# Patient Record
Sex: MALE | Race: WHITE | Employment: OTHER | URBAN - METROPOLITAN AREA
[De-identification: names, ages, dates, MRNs, and addresses within clinical notes are randomized per-mention and may not be internally consistent; named-entity substitution may affect disease eponyms.]

---

## 2017-10-04 PROBLEM — I25.111 CORONARY ARTERY DISEASE INVOLVING NATIVE CORONARY ARTERY OF NATIVE HEART WITH ANGINA PECTORIS WITH DOCUMENTED SPASM (HCC): Status: ACTIVE | Noted: 2017-10-04

## 2018-01-01 ENCOUNTER — CARE COORDINATION (OUTPATIENT)
Dept: CARE COORDINATION | Age: 63
End: 2018-01-01

## 2018-01-01 ENCOUNTER — HOSPITAL ENCOUNTER (OUTPATIENT)
Age: 63
Discharge: HOME | End: 2018-09-19
Payer: MEDICARE

## 2018-01-01 ENCOUNTER — OFFICE VISIT (OUTPATIENT)
Dept: FAMILY MEDICINE CLINIC | Age: 63
End: 2018-01-01
Payer: MEDICARE

## 2018-01-01 ENCOUNTER — ANTI-COAG VISIT (OUTPATIENT)
Dept: FAMILY MEDICINE CLINIC | Age: 63
End: 2018-01-01
Payer: MEDICARE

## 2018-01-01 ENCOUNTER — HOSPITAL ENCOUNTER (INPATIENT)
Age: 63
LOS: 1 days | Discharge: HOME | DRG: 189 | End: 2018-11-08
Attending: EMERGENCY MEDICINE | Admitting: FAMILY MEDICINE
Payer: MEDICARE

## 2018-01-01 ENCOUNTER — OFFICE VISIT (OUTPATIENT)
Dept: NON INVASIVE DIAGNOSTICS | Age: 63
End: 2018-01-01
Payer: MEDICARE

## 2018-01-01 ENCOUNTER — ANTI-COAG VISIT (OUTPATIENT)
Dept: FAMILY MEDICINE CLINIC | Age: 63
End: 2018-01-01

## 2018-01-01 ENCOUNTER — APPOINTMENT (OUTPATIENT)
Dept: GENERAL RADIOLOGY | Age: 63
End: 2018-01-01
Payer: MEDICARE

## 2018-01-01 ENCOUNTER — NURSE ONLY (OUTPATIENT)
Dept: FAMILY MEDICINE CLINIC | Age: 63
End: 2018-01-01
Payer: MEDICARE

## 2018-01-01 ENCOUNTER — TELEPHONE (OUTPATIENT)
Dept: NEUROLOGY | Age: 63
End: 2018-01-01

## 2018-01-01 ENCOUNTER — APPOINTMENT (OUTPATIENT)
Dept: ULTRASOUND IMAGING | Age: 63
DRG: 208 | End: 2018-01-01
Attending: FAMILY MEDICINE
Payer: MEDICARE

## 2018-01-01 ENCOUNTER — HOSPITAL ENCOUNTER (OUTPATIENT)
Age: 63
Discharge: HOME | End: 2018-07-21
Payer: MEDICARE

## 2018-01-01 ENCOUNTER — TELEPHONE (OUTPATIENT)
Dept: FAMILY MEDICINE CLINIC | Age: 63
End: 2018-01-01

## 2018-01-01 ENCOUNTER — TELEPHONE (OUTPATIENT)
Dept: CARDIOLOGY CLINIC | Age: 63
End: 2018-01-01

## 2018-01-01 ENCOUNTER — HOSPITAL ENCOUNTER (EMERGENCY)
Age: 63
Discharge: ANOTHER HOSPITAL | End: 2018-06-02
Attending: EMERGENCY MEDICINE
Payer: MEDICARE

## 2018-01-01 ENCOUNTER — CARE COORDINATION (OUTPATIENT)
Dept: CASE MANAGEMENT | Age: 63
End: 2018-01-01

## 2018-01-01 ENCOUNTER — APPOINTMENT (OUTPATIENT)
Dept: GENERAL RADIOLOGY | Age: 63
DRG: 208 | End: 2018-01-01
Attending: FAMILY MEDICINE
Payer: MEDICARE

## 2018-01-01 ENCOUNTER — HOSPITAL ENCOUNTER (EMERGENCY)
Age: 63
Discharge: HOME | End: 2018-03-16
Attending: EMERGENCY MEDICINE
Payer: MEDICARE

## 2018-01-01 ENCOUNTER — HOSPITAL ENCOUNTER (INPATIENT)
Age: 63
LOS: 2 days | Discharge: HOME | DRG: 194 | End: 2018-12-13
Attending: EMERGENCY MEDICINE | Admitting: FAMILY MEDICINE
Payer: MEDICARE

## 2018-01-01 ENCOUNTER — APPOINTMENT (OUTPATIENT)
Dept: GENERAL RADIOLOGY | Age: 63
DRG: 189 | End: 2018-01-01
Payer: MEDICARE

## 2018-01-01 ENCOUNTER — APPOINTMENT (OUTPATIENT)
Dept: GENERAL RADIOLOGY | Age: 63
DRG: 193 | End: 2018-01-01
Payer: MEDICARE

## 2018-01-01 ENCOUNTER — HOSPITAL ENCOUNTER (OUTPATIENT)
Age: 63
Discharge: HOME | End: 2018-06-02
Payer: MEDICARE

## 2018-01-01 ENCOUNTER — APPOINTMENT (OUTPATIENT)
Dept: CT IMAGING | Age: 63
End: 2018-01-01
Payer: MEDICARE

## 2018-01-01 ENCOUNTER — HOSPITAL ENCOUNTER (INPATIENT)
Age: 63
LOS: 3 days | Discharge: HOME | DRG: 193 | End: 2019-01-03
Attending: EMERGENCY MEDICINE | Admitting: FAMILY MEDICINE
Payer: MEDICARE

## 2018-01-01 ENCOUNTER — OFFICE VISIT (OUTPATIENT)
Dept: VASCULAR SURGERY | Age: 63
End: 2018-01-01
Payer: MEDICARE

## 2018-01-01 ENCOUNTER — APPOINTMENT (OUTPATIENT)
Dept: CT IMAGING | Age: 63
DRG: 194 | End: 2018-01-01
Payer: MEDICARE

## 2018-01-01 ENCOUNTER — NURSE ONLY (OUTPATIENT)
Dept: FAMILY MEDICINE CLINIC | Age: 63
End: 2018-01-01

## 2018-01-01 ENCOUNTER — OFFICE VISIT (OUTPATIENT)
Dept: CARDIOLOGY CLINIC | Age: 63
End: 2018-01-01
Payer: MEDICARE

## 2018-01-01 ENCOUNTER — APPOINTMENT (OUTPATIENT)
Dept: CT IMAGING | Age: 63
DRG: 189 | End: 2018-01-01
Payer: MEDICARE

## 2018-01-01 ENCOUNTER — TELEPHONE (OUTPATIENT)
Dept: NON INVASIVE DIAGNOSTICS | Age: 63
End: 2018-01-01

## 2018-01-01 ENCOUNTER — TELEPHONE (OUTPATIENT)
Dept: ADMINISTRATIVE | Age: 63
End: 2018-01-01

## 2018-01-01 ENCOUNTER — APPOINTMENT (OUTPATIENT)
Dept: GENERAL RADIOLOGY | Age: 63
DRG: 194 | End: 2018-01-01
Payer: MEDICARE

## 2018-01-01 ENCOUNTER — HOSPITAL ENCOUNTER (EMERGENCY)
Age: 63
Discharge: HOME | End: 2018-08-26
Attending: FAMILY MEDICINE
Payer: MEDICARE

## 2018-01-01 ENCOUNTER — OFFICE VISIT (OUTPATIENT)
Dept: NEUROLOGY | Age: 63
End: 2018-01-01
Payer: MEDICARE

## 2018-01-01 ENCOUNTER — HOSPITAL ENCOUNTER (INPATIENT)
Age: 63
LOS: 6 days | Discharge: HOME HEALTH | DRG: 208 | End: 2018-06-08
Attending: FAMILY MEDICINE | Admitting: FAMILY MEDICINE
Payer: MEDICARE

## 2018-01-01 VITALS
HEART RATE: 78 BPM | TEMPERATURE: 98.3 F | WEIGHT: 167 LBS | OXYGEN SATURATION: 95 % | RESPIRATION RATE: 18 BRPM | SYSTOLIC BLOOD PRESSURE: 117 MMHG | DIASTOLIC BLOOD PRESSURE: 54 MMHG | BODY MASS INDEX: 28.51 KG/M2 | HEIGHT: 64 IN

## 2018-01-01 VITALS
HEART RATE: 65 BPM | SYSTOLIC BLOOD PRESSURE: 126 MMHG | TEMPERATURE: 98.5 F | HEIGHT: 67 IN | OXYGEN SATURATION: 98 % | BODY MASS INDEX: 21.83 KG/M2 | WEIGHT: 139.06 LBS | RESPIRATION RATE: 18 BRPM | DIASTOLIC BLOOD PRESSURE: 62 MMHG

## 2018-01-01 VITALS
OXYGEN SATURATION: 95 % | HEIGHT: 64 IN | WEIGHT: 147 LBS | DIASTOLIC BLOOD PRESSURE: 68 MMHG | RESPIRATION RATE: 18 BRPM | HEART RATE: 80 BPM | SYSTOLIC BLOOD PRESSURE: 127 MMHG | TEMPERATURE: 98.2 F | BODY MASS INDEX: 25.1 KG/M2

## 2018-01-01 VITALS
WEIGHT: 166 LBS | DIASTOLIC BLOOD PRESSURE: 68 MMHG | SYSTOLIC BLOOD PRESSURE: 128 MMHG | HEART RATE: 62 BPM | BODY MASS INDEX: 28.49 KG/M2 | TEMPERATURE: 98 F | OXYGEN SATURATION: 96 %

## 2018-01-01 VITALS
HEIGHT: 64 IN | WEIGHT: 166 LBS | RESPIRATION RATE: 16 BRPM | HEART RATE: 75 BPM | BODY MASS INDEX: 28.34 KG/M2 | DIASTOLIC BLOOD PRESSURE: 60 MMHG | SYSTOLIC BLOOD PRESSURE: 130 MMHG

## 2018-01-01 VITALS
HEART RATE: 66 BPM | BODY MASS INDEX: 27.98 KG/M2 | SYSTOLIC BLOOD PRESSURE: 120 MMHG | TEMPERATURE: 97.7 F | OXYGEN SATURATION: 97 % | WEIGHT: 163 LBS | DIASTOLIC BLOOD PRESSURE: 68 MMHG

## 2018-01-01 VITALS
TEMPERATURE: 98.4 F | HEIGHT: 64 IN | SYSTOLIC BLOOD PRESSURE: 128 MMHG | RESPIRATION RATE: 16 BRPM | HEART RATE: 60 BPM | OXYGEN SATURATION: 97 % | DIASTOLIC BLOOD PRESSURE: 46 MMHG | WEIGHT: 158 LBS | BODY MASS INDEX: 26.98 KG/M2

## 2018-01-01 VITALS
RESPIRATION RATE: 16 BRPM | HEART RATE: 75 BPM | HEIGHT: 64 IN | DIASTOLIC BLOOD PRESSURE: 72 MMHG | SYSTOLIC BLOOD PRESSURE: 118 MMHG | BODY MASS INDEX: 28.36 KG/M2 | WEIGHT: 166.1 LBS

## 2018-01-01 VITALS
TEMPERATURE: 98 F | HEIGHT: 64 IN | DIASTOLIC BLOOD PRESSURE: 54 MMHG | RESPIRATION RATE: 18 BRPM | OXYGEN SATURATION: 96 % | HEART RATE: 60 BPM | SYSTOLIC BLOOD PRESSURE: 112 MMHG | BODY MASS INDEX: 26.98 KG/M2 | WEIGHT: 158 LBS

## 2018-01-01 VITALS
HEART RATE: 79 BPM | HEIGHT: 64 IN | OXYGEN SATURATION: 97 % | BODY MASS INDEX: 27.83 KG/M2 | TEMPERATURE: 98.8 F | SYSTOLIC BLOOD PRESSURE: 97 MMHG | DIASTOLIC BLOOD PRESSURE: 41 MMHG | WEIGHT: 163 LBS | RESPIRATION RATE: 15 BRPM

## 2018-01-01 VITALS
OXYGEN SATURATION: 94 % | SYSTOLIC BLOOD PRESSURE: 109 MMHG | DIASTOLIC BLOOD PRESSURE: 57 MMHG | TEMPERATURE: 97 F | WEIGHT: 144.9 LBS | HEART RATE: 81 BPM | BODY MASS INDEX: 24.74 KG/M2 | HEIGHT: 64 IN

## 2018-01-01 VITALS
HEART RATE: 95 BPM | WEIGHT: 174 LBS | DIASTOLIC BLOOD PRESSURE: 78 MMHG | SYSTOLIC BLOOD PRESSURE: 138 MMHG | TEMPERATURE: 98 F | OXYGEN SATURATION: 93 % | BODY MASS INDEX: 29.87 KG/M2

## 2018-01-01 VITALS
HEART RATE: 82 BPM | SYSTOLIC BLOOD PRESSURE: 112 MMHG | HEIGHT: 64 IN | TEMPERATURE: 97.6 F | WEIGHT: 169 LBS | DIASTOLIC BLOOD PRESSURE: 60 MMHG | BODY MASS INDEX: 28.85 KG/M2 | OXYGEN SATURATION: 92 %

## 2018-01-01 VITALS
WEIGHT: 148 LBS | DIASTOLIC BLOOD PRESSURE: 62 MMHG | HEART RATE: 66 BPM | TEMPERATURE: 98.8 F | SYSTOLIC BLOOD PRESSURE: 100 MMHG | BODY MASS INDEX: 23.18 KG/M2 | OXYGEN SATURATION: 95 %

## 2018-01-01 VITALS
HEART RATE: 76 BPM | BODY MASS INDEX: 25.95 KG/M2 | DIASTOLIC BLOOD PRESSURE: 59 MMHG | OXYGEN SATURATION: 99 % | TEMPERATURE: 97.4 F | WEIGHT: 152 LBS | SYSTOLIC BLOOD PRESSURE: 133 MMHG | HEIGHT: 64 IN | RESPIRATION RATE: 16 BRPM

## 2018-01-01 VITALS
HEART RATE: 63 BPM | TEMPERATURE: 97.4 F | DIASTOLIC BLOOD PRESSURE: 70 MMHG | OXYGEN SATURATION: 97 % | WEIGHT: 142 LBS | SYSTOLIC BLOOD PRESSURE: 118 MMHG | BODY MASS INDEX: 24.37 KG/M2

## 2018-01-01 VITALS
OXYGEN SATURATION: 97 % | BODY MASS INDEX: 26.95 KG/M2 | WEIGHT: 157 LBS | TEMPERATURE: 97.7 F | HEART RATE: 68 BPM | DIASTOLIC BLOOD PRESSURE: 70 MMHG | SYSTOLIC BLOOD PRESSURE: 120 MMHG

## 2018-01-01 DIAGNOSIS — Z79.01 ANTICOAGULATED ON COUMADIN: Chronic | ICD-10-CM

## 2018-01-01 DIAGNOSIS — J18.9 PNEUMONIA DUE TO INFECTIOUS ORGANISM, UNSPECIFIED LATERALITY, UNSPECIFIED PART OF LUNG: Primary | ICD-10-CM

## 2018-01-01 DIAGNOSIS — S66.911A SPRAIN AND STRAIN OF RIGHT HAND: ICD-10-CM

## 2018-01-01 DIAGNOSIS — I89.1 LYMPHANGITIS: ICD-10-CM

## 2018-01-01 DIAGNOSIS — E03.9 HYPOTHYROIDISM, UNSPECIFIED TYPE: Chronic | ICD-10-CM

## 2018-01-01 DIAGNOSIS — I05.9 MITRAL VALVE DISEASE: ICD-10-CM

## 2018-01-01 DIAGNOSIS — J44.9 CHRONIC OBSTRUCTIVE PULMONARY DISEASE, UNSPECIFIED COPD TYPE (HCC): Primary | Chronic | ICD-10-CM

## 2018-01-01 DIAGNOSIS — Z79.01 ENCOUNTER FOR MONITORING COUMADIN THERAPY: Primary | ICD-10-CM

## 2018-01-01 DIAGNOSIS — R41.82 ALTERED MENTAL STATUS, UNSPECIFIED ALTERED MENTAL STATUS TYPE: ICD-10-CM

## 2018-01-01 DIAGNOSIS — R07.9 CHEST PAIN, UNSPECIFIED TYPE: Primary | ICD-10-CM

## 2018-01-01 DIAGNOSIS — I48.0 PAF (PAROXYSMAL ATRIAL FIBRILLATION) (HCC): ICD-10-CM

## 2018-01-01 DIAGNOSIS — I48.0 PAF (PAROXYSMAL ATRIAL FIBRILLATION) (HCC): Primary | ICD-10-CM

## 2018-01-01 DIAGNOSIS — N40.1 BENIGN PROSTATIC HYPERPLASIA WITH URINARY FREQUENCY: ICD-10-CM

## 2018-01-01 DIAGNOSIS — R63.5 WEIGHT GAIN: ICD-10-CM

## 2018-01-01 DIAGNOSIS — S63.91XA SPRAIN AND STRAIN OF RIGHT HAND: ICD-10-CM

## 2018-01-01 DIAGNOSIS — I48.91 ATRIAL FIBRILLATION, UNSPECIFIED TYPE (HCC): ICD-10-CM

## 2018-01-01 DIAGNOSIS — R47.01 EXPRESSIVE APHASIA: Primary | ICD-10-CM

## 2018-01-01 DIAGNOSIS — J44.1 COPD EXACERBATION (HCC): ICD-10-CM

## 2018-01-01 DIAGNOSIS — I65.23 BILATERAL CAROTID ARTERY STENOSIS: Primary | ICD-10-CM

## 2018-01-01 DIAGNOSIS — R06.00 DYSPNEA AND RESPIRATORY ABNORMALITIES: ICD-10-CM

## 2018-01-01 DIAGNOSIS — Z86.79 S/P ABLATION OF ATRIAL FLUTTER: Primary | ICD-10-CM

## 2018-01-01 DIAGNOSIS — G25.0 BENIGN ESSENTIAL TREMOR: Primary | ICD-10-CM

## 2018-01-01 DIAGNOSIS — R09.02 HYPOXEMIA: ICD-10-CM

## 2018-01-01 DIAGNOSIS — I10 ESSENTIAL HYPERTENSION: ICD-10-CM

## 2018-01-01 DIAGNOSIS — J96.02 ACUTE RESPIRATORY FAILURE WITH HYPERCAPNIA (HCC): Primary | ICD-10-CM

## 2018-01-01 DIAGNOSIS — J44.1 COPD EXACERBATION (HCC): Primary | ICD-10-CM

## 2018-01-01 DIAGNOSIS — J44.9 CHRONIC OBSTRUCTIVE PULMONARY DISEASE, UNSPECIFIED COPD TYPE (HCC): Chronic | ICD-10-CM

## 2018-01-01 DIAGNOSIS — I25.111 CORONARY ARTERY DISEASE INVOLVING NATIVE CORONARY ARTERY OF NATIVE HEART WITH ANGINA PECTORIS WITH DOCUMENTED SPASM (HCC): Primary | ICD-10-CM

## 2018-01-01 DIAGNOSIS — E78.5 HYPERLIPIDEMIA, UNSPECIFIED HYPERLIPIDEMIA TYPE: ICD-10-CM

## 2018-01-01 DIAGNOSIS — Z51.81 ENCOUNTER FOR MONITORING COUMADIN THERAPY: Primary | ICD-10-CM

## 2018-01-01 DIAGNOSIS — G25.0 BENIGN ESSENTIAL TREMOR: ICD-10-CM

## 2018-01-01 DIAGNOSIS — S09.90XA CLOSED HEAD INJURY, INITIAL ENCOUNTER: Primary | ICD-10-CM

## 2018-01-01 DIAGNOSIS — Z79.01 CURRENT USE OF LONG TERM ANTICOAGULATION: ICD-10-CM

## 2018-01-01 DIAGNOSIS — I25.111 CORONARY ARTERY DISEASE INVOLVING NATIVE CORONARY ARTERY OF NATIVE HEART WITH ANGINA PECTORIS WITH DOCUMENTED SPASM (HCC): ICD-10-CM

## 2018-01-01 DIAGNOSIS — L03.114 CELLULITIS OF LEFT HAND: Primary | ICD-10-CM

## 2018-01-01 DIAGNOSIS — R07.89 OTHER CHEST PAIN: ICD-10-CM

## 2018-01-01 DIAGNOSIS — G93.41 ACUTE METABOLIC ENCEPHALOPATHY: ICD-10-CM

## 2018-01-01 DIAGNOSIS — G20 DEMENTIA DUE TO PARKINSON'S DISEASE WITHOUT BEHAVIORAL DISTURBANCE (HCC): ICD-10-CM

## 2018-01-01 DIAGNOSIS — K29.50 CHRONIC GASTRITIS WITHOUT BLEEDING, UNSPECIFIED GASTRITIS TYPE: ICD-10-CM

## 2018-01-01 DIAGNOSIS — L03.114 CELLULITIS OF LEFT HAND: ICD-10-CM

## 2018-01-01 DIAGNOSIS — I50.32 CHRONIC DIASTOLIC HEART FAILURE (HCC): ICD-10-CM

## 2018-01-01 DIAGNOSIS — J44.1 OBSTRUCTIVE CHRONIC BRONCHITIS WITH EXACERBATION (HCC): ICD-10-CM

## 2018-01-01 DIAGNOSIS — S63.502A SPRAIN AND STRAIN OF LEFT WRIST: ICD-10-CM

## 2018-01-01 DIAGNOSIS — G20 PARKINSON'S DISEASE (HCC): Chronic | ICD-10-CM

## 2018-01-01 DIAGNOSIS — Z98.890 STATUS POST CATHETER ABLATION OF ATRIAL FLUTTER: ICD-10-CM

## 2018-01-01 DIAGNOSIS — R06.89 DYSPNEA AND RESPIRATORY ABNORMALITIES: ICD-10-CM

## 2018-01-01 DIAGNOSIS — F02.80 DEMENTIA DUE TO PARKINSON'S DISEASE WITHOUT BEHAVIORAL DISTURBANCE (HCC): ICD-10-CM

## 2018-01-01 DIAGNOSIS — R06.03 RESPIRATORY DISTRESS: Primary | ICD-10-CM

## 2018-01-01 DIAGNOSIS — J96.21 ACUTE ON CHRONIC RESPIRATORY FAILURE WITH HYPOXIA AND HYPERCAPNIA (HCC): ICD-10-CM

## 2018-01-01 DIAGNOSIS — J96.22 ACUTE ON CHRONIC RESPIRATORY FAILURE WITH HYPOXIA AND HYPERCAPNIA (HCC): Primary | ICD-10-CM

## 2018-01-01 DIAGNOSIS — F33.42 RECURRENT MAJOR DEPRESSIVE DISORDER, IN FULL REMISSION (HCC): ICD-10-CM

## 2018-01-01 DIAGNOSIS — T07.XXXA ABRASION, MULTIPLE SITES: ICD-10-CM

## 2018-01-01 DIAGNOSIS — S22.32XA CLOSED FRACTURE OF ONE RIB OF LEFT SIDE, INITIAL ENCOUNTER: ICD-10-CM

## 2018-01-01 DIAGNOSIS — J96.22 ACUTE ON CHRONIC RESPIRATORY FAILURE WITH HYPOXIA AND HYPERCAPNIA (HCC): ICD-10-CM

## 2018-01-01 DIAGNOSIS — J96.01 ACUTE RESPIRATORY FAILURE WITH HYPOXIA (HCC): ICD-10-CM

## 2018-01-01 DIAGNOSIS — K62.5 RECTAL BLEEDING: ICD-10-CM

## 2018-01-01 DIAGNOSIS — J44.1 COPD WITH ACUTE EXACERBATION (HCC): Primary | ICD-10-CM

## 2018-01-01 DIAGNOSIS — I25.10 CORONARY ARTERY DISEASE INVOLVING NATIVE CORONARY ARTERY OF NATIVE HEART WITHOUT ANGINA PECTORIS: Primary | ICD-10-CM

## 2018-01-01 DIAGNOSIS — S66.912A SPRAIN AND STRAIN OF LEFT WRIST: ICD-10-CM

## 2018-01-01 DIAGNOSIS — J96.21 ACUTE ON CHRONIC RESPIRATORY FAILURE WITH HYPOXIA AND HYPERCAPNIA (HCC): Primary | ICD-10-CM

## 2018-01-01 DIAGNOSIS — S60.512A INFECTED ABRASION OF LEFT HAND, INITIAL ENCOUNTER: ICD-10-CM

## 2018-01-01 DIAGNOSIS — B34.8 PARAINFLUENZA INFECTION: ICD-10-CM

## 2018-01-01 DIAGNOSIS — Z98.890 STATUS POST CATHETER ABLATION OF ATRIAL FLUTTER: Primary | ICD-10-CM

## 2018-01-01 DIAGNOSIS — Z79.01 ANTICOAGULATED ON COUMADIN: Primary | Chronic | ICD-10-CM

## 2018-01-01 DIAGNOSIS — Z98.890 S/P ABLATION OF ATRIAL FLUTTER: Primary | ICD-10-CM

## 2018-01-01 DIAGNOSIS — I50.32 CHRONIC DIASTOLIC HEART FAILURE (HCC): Primary | ICD-10-CM

## 2018-01-01 DIAGNOSIS — J18.9 PNEUMONIA DUE TO ORGANISM: ICD-10-CM

## 2018-01-01 DIAGNOSIS — E44.0 MODERATE PROTEIN-CALORIE MALNUTRITION (HCC): ICD-10-CM

## 2018-01-01 DIAGNOSIS — L98.491 SKIN ULCER, LIMITED TO BREAKDOWN OF SKIN (HCC): Primary | ICD-10-CM

## 2018-01-01 DIAGNOSIS — R35.0 BENIGN PROSTATIC HYPERPLASIA WITH URINARY FREQUENCY: ICD-10-CM

## 2018-01-01 DIAGNOSIS — G20 PARKINSON DISEASE (HCC): Primary | ICD-10-CM

## 2018-01-01 DIAGNOSIS — R19.7 DIARRHEA, UNSPECIFIED TYPE: Primary | ICD-10-CM

## 2018-01-01 DIAGNOSIS — L08.9 INFECTED ABRASION OF LEFT HAND, INITIAL ENCOUNTER: ICD-10-CM

## 2018-01-01 DIAGNOSIS — M54.16 LUMBAR RADICULOPATHY: ICD-10-CM

## 2018-01-01 DIAGNOSIS — G20 PARKINSON'S DISEASE (HCC): Primary | Chronic | ICD-10-CM

## 2018-01-01 LAB
AADO2: 104.3 MMHG
AADO2: 114 MMHG
AADO2: 131.3 MMHG
AADO2: 131.8 MMHG
AADO2: 142.7 MMHG
AADO2: 187.3 MMHG
AADO2: 56.1 MMHG
AADO2: 97.4 MMHG
ALBUMIN SERPL-MCNC: 3.5 G/DL (ref 3.5–5.2)
ALBUMIN SERPL-MCNC: 3.6 G/DL (ref 3.5–5.2)
ALBUMIN SERPL-MCNC: 4 G/DL (ref 3.5–5.2)
ALBUMIN SERPL-MCNC: 4 G/DL (ref 3.5–5.2)
ALBUMIN SERPL-MCNC: 4.1 G/DL (ref 3.5–5.2)
ALBUMIN SERPL-MCNC: 4.5 G/DL (ref 3.5–5.2)
ALP BLD-CCNC: 104 U/L (ref 40–129)
ALP BLD-CCNC: 112 U/L (ref 40–129)
ALP BLD-CCNC: 122 U/L (ref 40–129)
ALP BLD-CCNC: 131 U/L (ref 40–129)
ALP BLD-CCNC: 92 U/L (ref 40–129)
ALP BLD-CCNC: 95 U/L (ref 40–129)
ALT SERPL-CCNC: 23 U/L (ref 0–40)
ALT SERPL-CCNC: 24 U/L (ref 0–40)
ALT SERPL-CCNC: 30 U/L (ref 0–40)
ALT SERPL-CCNC: 36 U/L (ref 0–40)
ALT SERPL-CCNC: 40 U/L (ref 0–40)
ALT SERPL-CCNC: 47 U/L (ref 0–40)
AMMONIA: 54 UMOL/L (ref 16–60)
AMPHETAMINE SCREEN, URINE: NOT DETECTED
ANION GAP SERPL CALCULATED.3IONS-SCNC: 10 MMOL/L (ref 7–16)
ANION GAP SERPL CALCULATED.3IONS-SCNC: 11 MMOL/L (ref 7–16)
ANION GAP SERPL CALCULATED.3IONS-SCNC: 12 MMOL/L (ref 7–16)
ANION GAP SERPL CALCULATED.3IONS-SCNC: 4 MMOL/L (ref 7–16)
ANION GAP SERPL CALCULATED.3IONS-SCNC: 5 MMOL/L (ref 7–16)
ANION GAP SERPL CALCULATED.3IONS-SCNC: 5 MMOL/L (ref 7–16)
ANION GAP SERPL CALCULATED.3IONS-SCNC: 6 MMOL/L (ref 7–16)
ANION GAP SERPL CALCULATED.3IONS-SCNC: 8 MMOL/L (ref 7–16)
ANION GAP SERPL CALCULATED.3IONS-SCNC: 9 MMOL/L (ref 7–16)
ANISOCYTOSIS: ABNORMAL
APTT: 42.6 SEC (ref 24.5–35.1)
AST SERPL-CCNC: 17 U/L (ref 0–39)
AST SERPL-CCNC: 18 U/L (ref 0–39)
AST SERPL-CCNC: 22 U/L (ref 0–39)
AST SERPL-CCNC: 28 U/L (ref 0–39)
AST SERPL-CCNC: 33 U/L (ref 0–39)
AST SERPL-CCNC: 48 U/L (ref 0–39)
B.E.: -0.4 MMOL/L (ref -3–3)
B.E.: -1.1 MMOL/L (ref -3–3)
B.E.: -2 MMOL/L (ref -3–3)
B.E.: -2.8 MMOL/L (ref -3–3)
B.E.: 1 MMOL/L (ref -3–3)
B.E.: 1.4 MMOL/L (ref -3–3)
B.E.: 1.8 MMOL/L (ref -3–3)
B.E.: 2.4 MMOL/L (ref -3–3)
B.E.: 3.7 MMOL/L (ref -3–3)
B.E.: 5.2 MMOL/L (ref -3–3)
B.E.: 6.3 MMOL/L (ref -3–3)
BARBITURATE SCREEN URINE: NOT DETECTED
BASOPHILS ABSOLUTE: 0 E9/L (ref 0–0.2)
BASOPHILS ABSOLUTE: 0 E9/L (ref 0–0.2)
BASOPHILS ABSOLUTE: 0.01 E9/L (ref 0–0.2)
BASOPHILS ABSOLUTE: 0.01 E9/L (ref 0–0.2)
BASOPHILS ABSOLUTE: 0.02 E9/L (ref 0–0.2)
BASOPHILS ABSOLUTE: 0.03 E9/L (ref 0–0.2)
BASOPHILS ABSOLUTE: 0.03 E9/L (ref 0–0.2)
BASOPHILS ABSOLUTE: 0.04 E9/L (ref 0–0.2)
BASOPHILS ABSOLUTE: 0.05 E9/L (ref 0–0.2)
BASOPHILS ABSOLUTE: 0.05 E9/L (ref 0–0.2)
BASOPHILS RELATIVE PERCENT: 0 % (ref 0–2)
BASOPHILS RELATIVE PERCENT: 0.1 % (ref 0–2)
BASOPHILS RELATIVE PERCENT: 0.1 % (ref 0–2)
BASOPHILS RELATIVE PERCENT: 0.2 % (ref 0–2)
BASOPHILS RELATIVE PERCENT: 0.2 % (ref 0–2)
BASOPHILS RELATIVE PERCENT: 0.3 % (ref 0–2)
BASOPHILS RELATIVE PERCENT: 0.4 % (ref 0–2)
BASOPHILS RELATIVE PERCENT: 0.5 % (ref 0–2)
BASOPHILS RELATIVE PERCENT: 0.7 % (ref 0–2)
BENZODIAZEPINE SCREEN, URINE: NOT DETECTED
BILIRUB SERPL-MCNC: 0.2 MG/DL (ref 0–1.2)
BILIRUB SERPL-MCNC: 0.3 MG/DL (ref 0–1.2)
BILIRUB SERPL-MCNC: 0.4 MG/DL (ref 0–1.2)
BILIRUB SERPL-MCNC: 0.4 MG/DL (ref 0–1.2)
BILIRUB SERPL-MCNC: 0.7 MG/DL (ref 0–1.2)
BILIRUB SERPL-MCNC: <0.2 MG/DL (ref 0–1.2)
BILIRUBIN URINE: NEGATIVE
BLOOD CULTURE, ROUTINE: NORMAL
BLOOD, URINE: NEGATIVE
BUN BLDV-MCNC: 10 MG/DL (ref 8–23)
BUN BLDV-MCNC: 11 MG/DL (ref 8–23)
BUN BLDV-MCNC: 13 MG/DL (ref 8–23)
BUN BLDV-MCNC: 14 MG/DL (ref 8–23)
BUN BLDV-MCNC: 16 MG/DL (ref 8–23)
BUN BLDV-MCNC: 18 MG/DL (ref 8–23)
BUN BLDV-MCNC: 19 MG/DL (ref 8–23)
BUN BLDV-MCNC: 20 MG/DL (ref 8–23)
BUN BLDV-MCNC: 20 MG/DL (ref 8–23)
BUN BLDV-MCNC: 22 MG/DL (ref 8–23)
CALCIUM SERPL-MCNC: 7.7 MG/DL (ref 8.6–10.2)
CALCIUM SERPL-MCNC: 8.3 MG/DL (ref 8.6–10.2)
CALCIUM SERPL-MCNC: 8.4 MG/DL (ref 8.6–10.2)
CALCIUM SERPL-MCNC: 8.5 MG/DL (ref 8.6–10.2)
CALCIUM SERPL-MCNC: 8.5 MG/DL (ref 8.6–10.2)
CALCIUM SERPL-MCNC: 8.6 MG/DL (ref 8.6–10.2)
CALCIUM SERPL-MCNC: 8.9 MG/DL (ref 8.6–10.2)
CALCIUM SERPL-MCNC: 9 MG/DL (ref 8.6–10.2)
CALCIUM SERPL-MCNC: 9.2 MG/DL (ref 8.6–10.2)
CALCIUM SERPL-MCNC: 9.4 MG/DL (ref 8.6–10.2)
CALCIUM SERPL-MCNC: 9.7 MG/DL (ref 8.6–10.2)
CANNABINOID SCREEN URINE: NOT DETECTED
CHLORIDE BLD-SCNC: 100 MMOL/L (ref 98–107)
CHLORIDE BLD-SCNC: 101 MMOL/L (ref 98–107)
CHLORIDE BLD-SCNC: 101 MMOL/L (ref 98–107)
CHLORIDE BLD-SCNC: 102 MMOL/L (ref 98–107)
CHLORIDE BLD-SCNC: 103 MMOL/L (ref 98–107)
CHLORIDE BLD-SCNC: 104 MMOL/L (ref 98–107)
CHLORIDE BLD-SCNC: 105 MMOL/L (ref 98–107)
CHLORIDE BLD-SCNC: 105 MMOL/L (ref 98–107)
CHLORIDE BLD-SCNC: 106 MMOL/L (ref 98–107)
CHLORIDE BLD-SCNC: 95 MMOL/L (ref 98–107)
CHLORIDE BLD-SCNC: 98 MMOL/L (ref 98–107)
CHLORIDE BLD-SCNC: 99 MMOL/L (ref 98–107)
CHLORIDE BLD-SCNC: 99 MMOL/L (ref 98–107)
CHOLESTEROL, TOTAL: 136 MG/DL (ref 0–199)
CHP ED QC CHECK: YES
CK MB: 5.1 NG/ML (ref 0–7.7)
CK MB: 6.2 NG/ML (ref 0–7.7)
CLARITY: CLEAR
CO2: 26 MMOL/L (ref 22–29)
CO2: 28 MMOL/L (ref 22–29)
CO2: 29 MMOL/L (ref 22–29)
CO2: 31 MMOL/L (ref 22–29)
CO2: 32 MMOL/L (ref 22–29)
CO2: 34 MMOL/L (ref 22–29)
CO2: 36 MMOL/L (ref 22–29)
CO2: 37 MMOL/L (ref 22–29)
CO2: 37 MMOL/L (ref 22–29)
COCAINE METABOLITE SCREEN URINE: NOT DETECTED
COHB: 0.3 % (ref 0–1.5)
COHB: 0.4 % (ref 0–1.5)
COHB: 0.6 % (ref 0–1.5)
COHB: 0.6 % (ref 0–1.5)
COHB: 0.8 % (ref 0–1.5)
COHB: 1.2 % (ref 0–1.5)
COHB: 1.7 % (ref 0–1.5)
COHB: 2.7 % (ref 0–1.5)
COHB: 3.2 % (ref 0–1.5)
COLOR: YELLOW
COMMENT: ABNORMAL
CREAT SERPL-MCNC: 0.5 MG/DL (ref 0.7–1.2)
CREAT SERPL-MCNC: 0.5 MG/DL (ref 0.7–1.2)
CREAT SERPL-MCNC: 0.6 MG/DL (ref 0.7–1.2)
CREAT SERPL-MCNC: 0.7 MG/DL (ref 0.7–1.2)
CREAT SERPL-MCNC: 0.8 MG/DL (ref 0.7–1.2)
CREAT SERPL-MCNC: 0.9 MG/DL (ref 0.7–1.2)
CRITICAL: ABNORMAL
CULTURE, BLOOD 2: NORMAL
CULTURE, RESPIRATORY: ABNORMAL
CULTURE, RESPIRATORY: ABNORMAL
CULTURE, STOOL: NORMAL
DATE ANALYZED: ABNORMAL
DATE OF COLLECTION: ABNORMAL
EKG ATRIAL RATE: 101 BPM
EKG ATRIAL RATE: 60 BPM
EKG ATRIAL RATE: 71 BPM
EKG ATRIAL RATE: 76 BPM
EKG ATRIAL RATE: 82 BPM
EKG ATRIAL RATE: 88 BPM
EKG P AXIS: 62 DEGREES
EKG P AXIS: 62 DEGREES
EKG P AXIS: 66 DEGREES
EKG P AXIS: 79 DEGREES
EKG P AXIS: 80 DEGREES
EKG P AXIS: 80 DEGREES
EKG P-R INTERVAL: 130 MS
EKG P-R INTERVAL: 138 MS
EKG P-R INTERVAL: 140 MS
EKG P-R INTERVAL: 144 MS
EKG P-R INTERVAL: 152 MS
EKG P-R INTERVAL: 168 MS
EKG Q-T INTERVAL: 332 MS
EKG Q-T INTERVAL: 338 MS
EKG Q-T INTERVAL: 364 MS
EKG Q-T INTERVAL: 384 MS
EKG Q-T INTERVAL: 386 MS
EKG Q-T INTERVAL: 396 MS
EKG QRS DURATION: 78 MS
EKG QRS DURATION: 80 MS
EKG QRS DURATION: 84 MS
EKG QRS DURATION: 84 MS
EKG QRS DURATION: 86 MS
EKG QRS DURATION: 86 MS
EKG QTC CALCULATION (BAZETT): 386 MS
EKG QTC CALCULATION (BAZETT): 408 MS
EKG QTC CALCULATION (BAZETT): 409 MS
EKG QTC CALCULATION (BAZETT): 430 MS
EKG QTC CALCULATION (BAZETT): 430 MS
EKG QTC CALCULATION (BAZETT): 448 MS
EKG R AXIS: 43 DEGREES
EKG R AXIS: 48 DEGREES
EKG R AXIS: 51 DEGREES
EKG R AXIS: 67 DEGREES
EKG R AXIS: 69 DEGREES
EKG R AXIS: 71 DEGREES
EKG T AXIS: 50 DEGREES
EKG T AXIS: 54 DEGREES
EKG T AXIS: 60 DEGREES
EKG T AXIS: 61 DEGREES
EKG T AXIS: 62 DEGREES
EKG T AXIS: 67 DEGREES
EKG VENTRICULAR RATE: 101 BPM
EKG VENTRICULAR RATE: 60 BPM
EKG VENTRICULAR RATE: 71 BPM
EKG VENTRICULAR RATE: 76 BPM
EKG VENTRICULAR RATE: 82 BPM
EKG VENTRICULAR RATE: 88 BPM
EOSINOPHILS ABSOLUTE: 0 E9/L (ref 0.05–0.5)
EOSINOPHILS ABSOLUTE: 0.01 E9/L (ref 0.05–0.5)
EOSINOPHILS ABSOLUTE: 0.02 E9/L (ref 0.05–0.5)
EOSINOPHILS ABSOLUTE: 0.03 E9/L (ref 0.05–0.5)
EOSINOPHILS ABSOLUTE: 0.04 E9/L (ref 0.05–0.5)
EOSINOPHILS ABSOLUTE: 0.07 E9/L (ref 0.05–0.5)
EOSINOPHILS ABSOLUTE: 0.1 E9/L (ref 0.05–0.5)
EOSINOPHILS ABSOLUTE: 0.15 E9/L (ref 0.05–0.5)
EOSINOPHILS ABSOLUTE: 0.17 E9/L (ref 0.05–0.5)
EOSINOPHILS ABSOLUTE: 0.18 E9/L (ref 0.05–0.5)
EOSINOPHILS ABSOLUTE: 0.29 E9/L (ref 0.05–0.5)
EOSINOPHILS ABSOLUTE: 0.44 E9/L (ref 0.05–0.5)
EOSINOPHILS RELATIVE PERCENT: 0 % (ref 0–6)
EOSINOPHILS RELATIVE PERCENT: 0.1 % (ref 0–6)
EOSINOPHILS RELATIVE PERCENT: 0.1 % (ref 0–6)
EOSINOPHILS RELATIVE PERCENT: 0.2 % (ref 0–6)
EOSINOPHILS RELATIVE PERCENT: 0.5 % (ref 0–6)
EOSINOPHILS RELATIVE PERCENT: 0.5 % (ref 0–6)
EOSINOPHILS RELATIVE PERCENT: 1 % (ref 0–6)
EOSINOPHILS RELATIVE PERCENT: 1.3 % (ref 0–6)
EOSINOPHILS RELATIVE PERCENT: 1.4 % (ref 0–6)
EOSINOPHILS RELATIVE PERCENT: 1.6 % (ref 0–6)
EOSINOPHILS RELATIVE PERCENT: 3.8 % (ref 0–6)
EOSINOPHILS RELATIVE PERCENT: 5.9 % (ref 0–6)
FILM ARRAY ADENOVIRUS: ABNORMAL
FILM ARRAY ADENOVIRUS: ABNORMAL
FILM ARRAY ADENOVIRUS: NORMAL
FILM ARRAY ADENOVIRUS: NORMAL
FILM ARRAY BORDETELLA PERTUSSIS: ABNORMAL
FILM ARRAY BORDETELLA PERTUSSIS: ABNORMAL
FILM ARRAY BORDETELLA PERTUSSIS: NORMAL
FILM ARRAY BORDETELLA PERTUSSIS: NORMAL
FILM ARRAY CHLAMYDOPHILIA PNEUMONIAE: ABNORMAL
FILM ARRAY CHLAMYDOPHILIA PNEUMONIAE: ABNORMAL
FILM ARRAY CHLAMYDOPHILIA PNEUMONIAE: NORMAL
FILM ARRAY CHLAMYDOPHILIA PNEUMONIAE: NORMAL
FILM ARRAY CORONAVIRUS 229E: ABNORMAL
FILM ARRAY CORONAVIRUS 229E: ABNORMAL
FILM ARRAY CORONAVIRUS 229E: NORMAL
FILM ARRAY CORONAVIRUS 229E: NORMAL
FILM ARRAY CORONAVIRUS HKU1: ABNORMAL
FILM ARRAY CORONAVIRUS HKU1: ABNORMAL
FILM ARRAY CORONAVIRUS HKU1: NORMAL
FILM ARRAY CORONAVIRUS HKU1: NORMAL
FILM ARRAY CORONAVIRUS NL63: ABNORMAL
FILM ARRAY CORONAVIRUS NL63: ABNORMAL
FILM ARRAY CORONAVIRUS NL63: NORMAL
FILM ARRAY CORONAVIRUS NL63: NORMAL
FILM ARRAY CORONAVIRUS OC43: ABNORMAL
FILM ARRAY CORONAVIRUS OC43: ABNORMAL
FILM ARRAY CORONAVIRUS OC43: NORMAL
FILM ARRAY CORONAVIRUS OC43: NORMAL
FILM ARRAY INFLUENZA A VIRUS 09H1: ABNORMAL
FILM ARRAY INFLUENZA A VIRUS 09H1: NORMAL
FILM ARRAY INFLUENZA A VIRUS 09H1: NORMAL
FILM ARRAY INFLUENZA A VIRUS H1: ABNORMAL
FILM ARRAY INFLUENZA A VIRUS H1: NORMAL
FILM ARRAY INFLUENZA A VIRUS H1: NORMAL
FILM ARRAY INFLUENZA A VIRUS H3: ABNORMAL
FILM ARRAY INFLUENZA A VIRUS H3: ABNORMAL
FILM ARRAY INFLUENZA A VIRUS H3: NORMAL
FILM ARRAY INFLUENZA A VIRUS H3: NORMAL
FILM ARRAY INFLUENZA A VIRUS: ABNORMAL
FILM ARRAY INFLUENZA A VIRUS: NORMAL
FILM ARRAY INFLUENZA A VIRUS: NORMAL
FILM ARRAY INFLUENZA B: ABNORMAL
FILM ARRAY INFLUENZA B: ABNORMAL
FILM ARRAY INFLUENZA B: NORMAL
FILM ARRAY INFLUENZA B: NORMAL
FILM ARRAY METAPNEUMOVIRUS: ABNORMAL
FILM ARRAY METAPNEUMOVIRUS: ABNORMAL
FILM ARRAY METAPNEUMOVIRUS: NORMAL
FILM ARRAY METAPNEUMOVIRUS: NORMAL
FILM ARRAY MYCOPLASMA PNEUMONIAE: ABNORMAL
FILM ARRAY MYCOPLASMA PNEUMONIAE: ABNORMAL
FILM ARRAY MYCOPLASMA PNEUMONIAE: NORMAL
FILM ARRAY MYCOPLASMA PNEUMONIAE: NORMAL
FILM ARRAY PARAINFLUENZA VIRUS 1: ABNORMAL
FILM ARRAY PARAINFLUENZA VIRUS 1: ABNORMAL
FILM ARRAY PARAINFLUENZA VIRUS 1: NORMAL
FILM ARRAY PARAINFLUENZA VIRUS 1: NORMAL
FILM ARRAY PARAINFLUENZA VIRUS 2: ABNORMAL
FILM ARRAY PARAINFLUENZA VIRUS 2: ABNORMAL
FILM ARRAY PARAINFLUENZA VIRUS 2: NORMAL
FILM ARRAY PARAINFLUENZA VIRUS 2: NORMAL
FILM ARRAY PARAINFLUENZA VIRUS 3: ABNORMAL
FILM ARRAY PARAINFLUENZA VIRUS 3: NORMAL
FILM ARRAY PARAINFLUENZA VIRUS 3: NORMAL
FILM ARRAY PARAINFLUENZA VIRUS 4: ABNORMAL
FILM ARRAY PARAINFLUENZA VIRUS 4: ABNORMAL
FILM ARRAY PARAINFLUENZA VIRUS 4: NORMAL
FILM ARRAY PARAINFLUENZA VIRUS 4: NORMAL
FILM ARRAY RESPIRATORY SYNCITIAL VIRUS: ABNORMAL
FILM ARRAY RESPIRATORY SYNCITIAL VIRUS: NORMAL
FILM ARRAY RESPIRATORY SYNCITIAL VIRUS: NORMAL
FILM ARRAY RHINOVIRUS/ENTEROVIRUS: ABNORMAL
FILM ARRAY RHINOVIRUS/ENTEROVIRUS: ABNORMAL
FILM ARRAY RHINOVIRUS/ENTEROVIRUS: NORMAL
FILM ARRAY RHINOVIRUS/ENTEROVIRUS: NORMAL
FIO2: 30 %
FIO2: 40 %
FIO2: 50 %
GFR AFRICAN AMERICAN: >60
GFR NON-AFRICAN AMERICAN: >60 ML/MIN/1.73
GIARDIA ANTIGEN STOOL: NORMAL
GLUCOSE BLD-MCNC: 100 MG/DL (ref 74–109)
GLUCOSE BLD-MCNC: 103 MG/DL (ref 74–99)
GLUCOSE BLD-MCNC: 106 MG/DL (ref 74–109)
GLUCOSE BLD-MCNC: 108 MG/DL (ref 74–109)
GLUCOSE BLD-MCNC: 112 MG/DL
GLUCOSE BLD-MCNC: 112 MG/DL (ref 74–109)
GLUCOSE BLD-MCNC: 115 MG/DL (ref 74–109)
GLUCOSE BLD-MCNC: 120 MG/DL (ref 74–99)
GLUCOSE BLD-MCNC: 132 MG/DL (ref 74–109)
GLUCOSE BLD-MCNC: 143 MG/DL (ref 74–99)
GLUCOSE BLD-MCNC: 154 MG/DL (ref 74–99)
GLUCOSE BLD-MCNC: 159 MG/DL (ref 74–99)
GLUCOSE BLD-MCNC: 82 MG/DL (ref 74–109)
GLUCOSE BLD-MCNC: 90 MG/DL (ref 74–99)
GLUCOSE BLD-MCNC: 92 MG/DL (ref 74–109)
GLUCOSE BLD-MCNC: 98 MG/DL (ref 74–109)
GLUCOSE URINE: NEGATIVE MG/DL
GRAM STAIN ORDERABLE: NORMAL
HCO3: 25.3 MMOL/L (ref 22–26)
HCO3: 25.9 MMOL/L (ref 22–26)
HCO3: 28.2 MMOL/L (ref 22–26)
HCO3: 28.5 MMOL/L (ref 22–26)
HCO3: 29.5 MMOL/L (ref 22–26)
HCO3: 30.6 MMOL/L (ref 22–26)
HCO3: 31.6 MMOL/L (ref 22–26)
HCO3: 33 MMOL/L (ref 22–26)
HCO3: 33.9 MMOL/L (ref 22–26)
HCO3: 36.4 MMOL/L (ref 22–26)
HCO3: 36.7 MMOL/L (ref 22–26)
HCT VFR BLD CALC: 36.8 % (ref 37–54)
HCT VFR BLD CALC: 36.9 % (ref 37–54)
HCT VFR BLD CALC: 38.8 % (ref 37–54)
HCT VFR BLD CALC: 39 % (ref 37–54)
HCT VFR BLD CALC: 41.1 % (ref 37–54)
HCT VFR BLD CALC: 41.6 % (ref 37–54)
HCT VFR BLD CALC: 42.2 % (ref 37–54)
HCT VFR BLD CALC: 43.3 % (ref 37–54)
HCT VFR BLD CALC: 43.9 % (ref 37–54)
HCT VFR BLD CALC: 44.3 % (ref 37–54)
HCT VFR BLD CALC: 44.7 % (ref 37–54)
HCT VFR BLD CALC: 45.7 % (ref 37–54)
HCT VFR BLD CALC: 46.4 % (ref 37–54)
HCT VFR BLD CALC: 46.6 % (ref 37–54)
HCT VFR BLD CALC: 46.8 % (ref 37–54)
HDLC SERPL-MCNC: 39 MG/DL
HEMOGLOBIN: 10.8 G/DL (ref 12.5–16.5)
HEMOGLOBIN: 11 G/DL (ref 12.5–16.5)
HEMOGLOBIN: 11.5 G/DL (ref 12.5–16.5)
HEMOGLOBIN: 11.8 G/DL (ref 12.5–16.5)
HEMOGLOBIN: 12.4 G/DL (ref 12.5–16.5)
HEMOGLOBIN: 12.6 G/DL (ref 12.5–16.5)
HEMOGLOBIN: 12.9 G/DL (ref 12.5–16.5)
HEMOGLOBIN: 12.9 G/DL (ref 12.5–16.5)
HEMOGLOBIN: 13.2 G/DL (ref 12.5–16.5)
HEMOGLOBIN: 13.2 G/DL (ref 12.5–16.5)
HEMOGLOBIN: 13.3 G/DL (ref 12.5–16.5)
HEMOGLOBIN: 13.5 G/DL (ref 12.5–16.5)
HEMOGLOBIN: 13.6 G/DL (ref 12.5–16.5)
HEMOGLOBIN: 14.4 G/DL (ref 12.5–16.5)
HEMOGLOBIN: 14.5 G/DL (ref 12.5–16.5)
HHB: 1.5 % (ref 0–5)
HHB: 12.2 % (ref 0–5)
HHB: 12.3 % (ref 0–5)
HHB: 2.1 % (ref 0–5)
HHB: 3.1 % (ref 0–5)
HHB: 3.3 % (ref 0–5)
HHB: 3.4 % (ref 0–5)
HHB: 3.9 % (ref 0–5)
HHB: 4 % (ref 0–5)
HHB: 4.3 % (ref 0–5)
HHB: 4.5 % (ref 0–5)
IMMATURE GRANULOCYTES #: 0.02 E9/L
IMMATURE GRANULOCYTES #: 0.03 E9/L
IMMATURE GRANULOCYTES #: 0.04 E9/L
IMMATURE GRANULOCYTES #: 0.05 E9/L
IMMATURE GRANULOCYTES #: 0.05 E9/L
IMMATURE GRANULOCYTES %: 0.2 % (ref 0–5)
IMMATURE GRANULOCYTES %: 0.3 % (ref 0–5)
IMMATURE GRANULOCYTES %: 0.4 % (ref 0–5)
INR BLD: 1.3
INR BLD: 1.3
INR BLD: 1.7
INR BLD: 1.8
INR BLD: 1.9
INR BLD: 2.1
INR BLD: 2.1
INR BLD: 2.2
INR BLD: 2.3
INR BLD: 2.4
INR BLD: 2.5
INR BLD: 2.7
INR BLD: 2.7
INR BLD: 2.8
INR BLD: 2.9
INR BLD: 3
INR BLD: 3
INR BLD: 3.1
INR BLD: 3.1
INR BLD: 3.2
INR BLD: 3.4
INR BLD: 3.6
INR BLD: 4.2
INTERNATIONAL NORMALIZATION RATIO, POC: 1.1
INTERNATIONAL NORMALIZATION RATIO, POC: 1.1
INTERNATIONAL NORMALIZATION RATIO, POC: 1.3
INTERNATIONAL NORMALIZATION RATIO, POC: 1.3
INTERNATIONAL NORMALIZATION RATIO, POC: 1.4
INTERNATIONAL NORMALIZATION RATIO, POC: 1.4
INTERNATIONAL NORMALIZATION RATIO, POC: 1.7
INTERNATIONAL NORMALIZATION RATIO, POC: 1.8
INTERNATIONAL NORMALIZATION RATIO, POC: 1.8
INTERNATIONAL NORMALIZATION RATIO, POC: 1.9
INTERNATIONAL NORMALIZATION RATIO, POC: 1.9
INTERNATIONAL NORMALIZATION RATIO, POC: 2
INTERNATIONAL NORMALIZATION RATIO, POC: 2
INTERNATIONAL NORMALIZATION RATIO, POC: 2.3
INTERNATIONAL NORMALIZATION RATIO, POC: 2.3
INTERNATIONAL NORMALIZATION RATIO, POC: 2.4
INTERNATIONAL NORMALIZATION RATIO, POC: 2.5
INTERNATIONAL NORMALIZATION RATIO, POC: 2.5
INTERNATIONAL NORMALIZATION RATIO, POC: 2.7
INTERNATIONAL NORMALIZATION RATIO, POC: 3.2
INTERNATIONAL NORMALIZATION RATIO, POC: 3.2
INTERNATIONAL NORMALIZATION RATIO, POC: 3.4
INTERNATIONAL NORMALIZATION RATIO, POC: 3.4
INTERNATIONAL NORMALIZATION RATIO, POC: 3.5
INTERNATIONAL NORMALIZATION RATIO, POC: 3.5
INTERNATIONAL NORMALIZATION RATIO, POC: 3.6
INTERNATIONAL NORMALIZATION RATIO, POC: 3.6
INTERNATIONAL NORMALIZATION RATIO, POC: 8
KETONES, URINE: NEGATIVE MG/DL
L. PNEUMOPHILA SEROGP 1 UR AG: NORMAL
LAB: 9558
LAB: ABNORMAL
LACTIC ACID: 0.5 MMOL/L (ref 0.5–2.2)
LACTIC ACID: 0.6 MMOL/L (ref 0.5–2.2)
LACTIC ACID: 0.7 MMOL/L (ref 0.5–2.2)
LACTIC ACID: 1.4 MMOL/L (ref 0.5–2.2)
LDL CHOLESTEROL CALCULATED: 72 MG/DL (ref 0–99)
LEUKOCYTE ESTERASE, URINE: NEGATIVE
LIPASE: 26 U/L (ref 13–60)
LV EF: 58 %
LVEF MODALITY: NORMAL
LYMPHOCYTES ABSOLUTE: 0.56 E9/L (ref 1.5–4)
LYMPHOCYTES ABSOLUTE: 0.74 E9/L (ref 1.5–4)
LYMPHOCYTES ABSOLUTE: 0.74 E9/L (ref 1.5–4)
LYMPHOCYTES ABSOLUTE: 0.79 E9/L (ref 1.5–4)
LYMPHOCYTES ABSOLUTE: 0.8 E9/L (ref 1.5–4)
LYMPHOCYTES ABSOLUTE: 0.9 E9/L (ref 1.5–4)
LYMPHOCYTES ABSOLUTE: 0.94 E9/L (ref 1.5–4)
LYMPHOCYTES ABSOLUTE: 1.19 E9/L (ref 1.5–4)
LYMPHOCYTES ABSOLUTE: 1.5 E9/L (ref 1.5–4)
LYMPHOCYTES ABSOLUTE: 1.56 E9/L (ref 1.5–4)
LYMPHOCYTES ABSOLUTE: 1.58 E9/L (ref 1.5–4)
LYMPHOCYTES ABSOLUTE: 1.78 E9/L (ref 1.5–4)
LYMPHOCYTES ABSOLUTE: 1.81 E9/L (ref 1.5–4)
LYMPHOCYTES ABSOLUTE: 2.11 E9/L (ref 1.5–4)
LYMPHOCYTES RELATIVE PERCENT: 11.7 % (ref 20–42)
LYMPHOCYTES RELATIVE PERCENT: 11.9 % (ref 20–42)
LYMPHOCYTES RELATIVE PERCENT: 12.9 % (ref 20–42)
LYMPHOCYTES RELATIVE PERCENT: 14 % (ref 20–42)
LYMPHOCYTES RELATIVE PERCENT: 17.2 % (ref 20–42)
LYMPHOCYTES RELATIVE PERCENT: 23.5 % (ref 20–42)
LYMPHOCYTES RELATIVE PERCENT: 24.1 % (ref 20–42)
LYMPHOCYTES RELATIVE PERCENT: 5.7 % (ref 20–42)
LYMPHOCYTES RELATIVE PERCENT: 6.3 % (ref 20–42)
LYMPHOCYTES RELATIVE PERCENT: 6.8 % (ref 20–42)
LYMPHOCYTES RELATIVE PERCENT: 7.9 % (ref 20–42)
LYMPHOCYTES RELATIVE PERCENT: 8 % (ref 20–42)
LYMPHOCYTES RELATIVE PERCENT: 8.4 % (ref 20–42)
LYMPHOCYTES RELATIVE PERCENT: 8.6 % (ref 20–42)
Lab: 20
Lab: 2057
Lab: 2235
Lab: 417
Lab: 537
Lab: 605
Lab: 900
Lab: ABNORMAL
MAGNESIUM: 1.9 MG/DL (ref 1.6–2.6)
MAGNESIUM: 2 MG/DL (ref 1.6–2.6)
MAGNESIUM: 2 MG/DL (ref 1.6–2.6)
MAGNESIUM: 2.1 MG/DL (ref 1.6–2.6)
MAGNESIUM: 2.1 MG/DL (ref 1.6–2.6)
MAGNESIUM: 2.3 MG/DL (ref 1.6–2.6)
MAGNESIUM: 2.4 MG/DL (ref 1.6–2.6)
MAGNESIUM: 2.5 MG/DL (ref 1.6–2.6)
MCH RBC QN AUTO: 29 PG (ref 26–35)
MCH RBC QN AUTO: 29.1 PG (ref 26–35)
MCH RBC QN AUTO: 29.2 PG (ref 26–35)
MCH RBC QN AUTO: 29.3 PG (ref 26–35)
MCH RBC QN AUTO: 29.5 PG (ref 26–35)
MCH RBC QN AUTO: 29.6 PG (ref 26–35)
MCH RBC QN AUTO: 29.9 PG (ref 26–35)
MCHC RBC AUTO-ENTMCNC: 29 % (ref 32–34.5)
MCHC RBC AUTO-ENTMCNC: 29.3 % (ref 32–34.5)
MCHC RBC AUTO-ENTMCNC: 29.5 % (ref 32–34.5)
MCHC RBC AUTO-ENTMCNC: 29.5 % (ref 32–34.5)
MCHC RBC AUTO-ENTMCNC: 29.8 % (ref 32–34.5)
MCHC RBC AUTO-ENTMCNC: 30 % (ref 32–34.5)
MCHC RBC AUTO-ENTMCNC: 30.1 % (ref 32–34.5)
MCHC RBC AUTO-ENTMCNC: 30.4 % (ref 32–34.5)
MCHC RBC AUTO-ENTMCNC: 30.6 % (ref 32–34.5)
MCHC RBC AUTO-ENTMCNC: 30.7 % (ref 32–34.5)
MCHC RBC AUTO-ENTMCNC: 31 % (ref 32–34.5)
MCHC RBC AUTO-ENTMCNC: 31 % (ref 32–34.5)
MCV RBC AUTO: 100.4 FL (ref 80–99.9)
MCV RBC AUTO: 93.6 FL (ref 80–99.9)
MCV RBC AUTO: 95.1 FL (ref 80–99.9)
MCV RBC AUTO: 95.4 FL (ref 80–99.9)
MCV RBC AUTO: 96.9 FL (ref 80–99.9)
MCV RBC AUTO: 97.4 FL (ref 80–99.9)
MCV RBC AUTO: 97.5 FL (ref 80–99.9)
MCV RBC AUTO: 97.7 FL (ref 80–99.9)
MCV RBC AUTO: 98.3 FL (ref 80–99.9)
MCV RBC AUTO: 98.4 FL (ref 80–99.9)
MCV RBC AUTO: 98.4 FL (ref 80–99.9)
MCV RBC AUTO: 98.5 FL (ref 80–99.9)
MCV RBC AUTO: 98.5 FL (ref 80–99.9)
MCV RBC AUTO: 99.2 FL (ref 80–99.9)
MCV RBC AUTO: 99.5 FL (ref 80–99.9)
METER GLUCOSE: 102 MG/DL (ref 70–110)
METER GLUCOSE: 112 MG/DL (ref 70–110)
METER GLUCOSE: 129 MG/DL (ref 74–99)
METHADONE SCREEN, URINE: NOT DETECTED
METHB: 0 % (ref 0–1.5)
METHB: 0 % (ref 0–1.5)
METHB: 0.1 % (ref 0–1.5)
METHB: 0.2 % (ref 0–1.5)
METHB: 0.3 % (ref 0–1.5)
METHB: 0.4 % (ref 0–1.5)
METHB: 0.4 % (ref 0–1.5)
MODE: ABNORMAL
MODE: AC
MODE: AC
MONOCYTES ABSOLUTE: 0.1 E9/L (ref 0.1–0.95)
MONOCYTES ABSOLUTE: 0.38 E9/L (ref 0.1–0.95)
MONOCYTES ABSOLUTE: 0.4 E9/L (ref 0.1–0.95)
MONOCYTES ABSOLUTE: 0.52 E9/L (ref 0.1–0.95)
MONOCYTES ABSOLUTE: 0.64 E9/L (ref 0.1–0.95)
MONOCYTES ABSOLUTE: 0.66 E9/L (ref 0.1–0.95)
MONOCYTES ABSOLUTE: 0.71 E9/L (ref 0.1–0.95)
MONOCYTES ABSOLUTE: 0.73 E9/L (ref 0.1–0.95)
MONOCYTES ABSOLUTE: 0.75 E9/L (ref 0.1–0.95)
MONOCYTES ABSOLUTE: 1.02 E9/L (ref 0.1–0.95)
MONOCYTES ABSOLUTE: 1.03 E9/L (ref 0.1–0.95)
MONOCYTES ABSOLUTE: 1.18 E9/L (ref 0.1–0.95)
MONOCYTES ABSOLUTE: 1.19 E9/L (ref 0.1–0.95)
MONOCYTES ABSOLUTE: 1.25 E9/L (ref 0.1–0.95)
MONOCYTES RELATIVE PERCENT: 1.2 % (ref 2–12)
MONOCYTES RELATIVE PERCENT: 10.2 % (ref 2–12)
MONOCYTES RELATIVE PERCENT: 10.5 % (ref 2–12)
MONOCYTES RELATIVE PERCENT: 4 % (ref 2–12)
MONOCYTES RELATIVE PERCENT: 5.1 % (ref 2–12)
MONOCYTES RELATIVE PERCENT: 5.1 % (ref 2–12)
MONOCYTES RELATIVE PERCENT: 5.9 % (ref 2–12)
MONOCYTES RELATIVE PERCENT: 6.5 % (ref 2–12)
MONOCYTES RELATIVE PERCENT: 7.4 % (ref 2–12)
MONOCYTES RELATIVE PERCENT: 7.6 % (ref 2–12)
MONOCYTES RELATIVE PERCENT: 7.9 % (ref 2–12)
MONOCYTES RELATIVE PERCENT: 8.3 % (ref 2–12)
MONOCYTES RELATIVE PERCENT: 8.6 % (ref 2–12)
MONOCYTES RELATIVE PERCENT: 8.6 % (ref 2–12)
MRSA CULTURE ONLY: NORMAL
NEUTROPHILS ABSOLUTE: 10.64 E9/L (ref 1.8–7.3)
NEUTROPHILS ABSOLUTE: 12.07 E9/L (ref 1.8–7.3)
NEUTROPHILS ABSOLUTE: 12.13 E9/L (ref 1.8–7.3)
NEUTROPHILS ABSOLUTE: 12.67 E9/L (ref 1.8–7.3)
NEUTROPHILS ABSOLUTE: 4.85 E9/L (ref 1.8–7.3)
NEUTROPHILS ABSOLUTE: 4.9 E9/L (ref 1.8–7.3)
NEUTROPHILS ABSOLUTE: 5.78 E9/L (ref 1.8–7.3)
NEUTROPHILS ABSOLUTE: 7.26 E9/L (ref 1.8–7.3)
NEUTROPHILS ABSOLUTE: 7.69 E9/L (ref 1.8–7.3)
NEUTROPHILS ABSOLUTE: 7.76 E9/L (ref 1.8–7.3)
NEUTROPHILS ABSOLUTE: 8.27 E9/L (ref 1.8–7.3)
NEUTROPHILS ABSOLUTE: 8.66 E9/L (ref 1.8–7.3)
NEUTROPHILS ABSOLUTE: 8.7 E9/L (ref 1.8–7.3)
NEUTROPHILS ABSOLUTE: 9.12 E9/L (ref 1.8–7.3)
NEUTROPHILS RELATIVE PERCENT: 63 % (ref 43–80)
NEUTROPHILS RELATIVE PERCENT: 66.2 % (ref 43–80)
NEUTROPHILS RELATIVE PERCENT: 70.7 % (ref 43–80)
NEUTROPHILS RELATIVE PERCENT: 73.1 % (ref 43–80)
NEUTROPHILS RELATIVE PERCENT: 77.1 % (ref 43–80)
NEUTROPHILS RELATIVE PERCENT: 78.6 % (ref 43–80)
NEUTROPHILS RELATIVE PERCENT: 79.6 % (ref 43–80)
NEUTROPHILS RELATIVE PERCENT: 82.5 % (ref 43–80)
NEUTROPHILS RELATIVE PERCENT: 83.5 % (ref 43–80)
NEUTROPHILS RELATIVE PERCENT: 86.2 % (ref 43–80)
NEUTROPHILS RELATIVE PERCENT: 87 % (ref 43–80)
NEUTROPHILS RELATIVE PERCENT: 87.3 % (ref 43–80)
NEUTROPHILS RELATIVE PERCENT: 87.5 % (ref 43–80)
NEUTROPHILS RELATIVE PERCENT: 89.9 % (ref 43–80)
NITRITE, URINE: NEGATIVE
O2 CONTENT: 14.3 ML/DL
O2 CONTENT: 16.5 ML/DL
O2 CONTENT: 17.5 ML/DL
O2 CONTENT: 18 ML/DL
O2 CONTENT: 18.1 ML/DL
O2 CONTENT: 18.2 ML/DL
O2 CONTENT: 18.4 ML/DL
O2 CONTENT: 18.6 ML/DL
O2 CONTENT: 18.9 ML/DL
O2 CONTENT: 18.9 ML/DL
O2 CONTENT: 19 ML/DL
O2 SATURATION: 87.5 % (ref 92–98.5)
O2 SATURATION: 87.7 % (ref 92–98.5)
O2 SATURATION: 95.4 % (ref 92–98.5)
O2 SATURATION: 95.6 % (ref 92–98.5)
O2 SATURATION: 96 % (ref 92–98.5)
O2 SATURATION: 96.1 % (ref 92–98.5)
O2 SATURATION: 96.6 % (ref 92–98.5)
O2 SATURATION: 96.6 % (ref 92–98.5)
O2 SATURATION: 96.9 % (ref 92–98.5)
O2 SATURATION: 97.9 % (ref 92–98.5)
O2 SATURATION: 98.5 % (ref 92–98.5)
O2HB: 86.1 % (ref 94–97)
O2HB: 86.8 % (ref 94–97)
O2HB: 92.5 % (ref 94–97)
O2HB: 92.7 % (ref 94–97)
O2HB: 95 % (ref 94–97)
O2HB: 95.4 % (ref 94–97)
O2HB: 95.5 % (ref 94–97)
O2HB: 95.7 % (ref 94–97)
O2HB: 96.3 % (ref 94–97)
O2HB: 97.4 % (ref 94–97)
O2HB: 97.5 % (ref 94–97)
OPERATOR ID: 2485
OPERATOR ID: 3186
OPERATOR ID: ABNORMAL
OPIATE SCREEN URINE: NOT DETECTED
ORGANISM: ABNORMAL
PATIENT TEMP: 37 C
PCO2: 143 MMHG (ref 35–45)
PCO2: 53.8 MMHG (ref 35–45)
PCO2: 56.1 MMHG (ref 35–45)
PCO2: 57.9 MMHG (ref 35–45)
PCO2: 62.6 MMHG (ref 35–45)
PCO2: 68.8 MMHG (ref 35–45)
PCO2: 70 MMHG (ref 35–45)
PCO2: 75.4 MMHG (ref 35–45)
PCO2: 76.8 MMHG (ref 35–45)
PCO2: 84.5 MMHG (ref 35–45)
PCO2: 87.9 MMHG (ref 35–45)
PDW BLD-RTO: 15.2 FL (ref 11.5–15)
PDW BLD-RTO: 15.2 FL (ref 11.5–15)
PDW BLD-RTO: 15.5 FL (ref 11.5–15)
PDW BLD-RTO: 15.6 FL (ref 11.5–15)
PDW BLD-RTO: 15.6 FL (ref 11.5–15)
PDW BLD-RTO: 15.7 FL (ref 11.5–15)
PDW BLD-RTO: 15.8 FL (ref 11.5–15)
PDW BLD-RTO: 15.9 FL (ref 11.5–15)
PDW BLD-RTO: 16 FL (ref 11.5–15)
PDW BLD-RTO: 16 FL (ref 11.5–15)
PDW BLD-RTO: 16.1 FL (ref 11.5–15)
PDW BLD-RTO: 16.3 FL (ref 11.5–15)
PDW BLD-RTO: 16.3 FL (ref 11.5–15)
PEEP/CPAP: 5 CMH2O
PEEP/CPAP: 5 CMH2O
PEEP/CPAP: 5 CMH?O
PEEP/CPAP: 6 CMH?O
PEEP/CPAP: 8 CMH2O
PFO2: 1.16 MMHG/%
PFO2: 1.38 MMHG/%
PFO2: 1.92 MMHG/%
PFO2: 2.05 MMHG/%
PFO2: 2.28 MMHG/%
PFO2: 2.3 MMHG/%
PFO2: 2.62 MMHG/%
PFO2: 2.81 MMHG/%
PH BLOOD GAS: 7.03 (ref 7.35–7.45)
PH BLOOD GAS: 7.21 (ref 7.35–7.45)
PH BLOOD GAS: 7.21 (ref 7.35–7.45)
PH BLOOD GAS: 7.23 (ref 7.35–7.45)
PH BLOOD GAS: 7.24 (ref 7.35–7.45)
PH BLOOD GAS: 7.29 (ref 7.35–7.45)
PH BLOOD GAS: 7.3 (ref 7.35–7.45)
PH BLOOD GAS: 7.31 (ref 7.35–7.45)
PH BLOOD GAS: 7.35 (ref 7.35–7.45)
PH UA: 6.5 (ref 5–9)
PHENCYCLIDINE SCREEN URINE: NOT DETECTED
PHOSPHORUS: 1.8 MG/DL (ref 2.5–4.5)
PHOSPHORUS: 1.9 MG/DL (ref 2.5–4.5)
PHOSPHORUS: 1.9 MG/DL (ref 2.5–4.5)
PHOSPHORUS: 2 MG/DL (ref 2.5–4.5)
PHOSPHORUS: 2.3 MG/DL (ref 2.5–4.5)
PHOSPHORUS: 2.9 MG/DL (ref 2.5–4.5)
PLATELET # BLD: 147 E9/L (ref 130–450)
PLATELET # BLD: 149 E9/L (ref 130–450)
PLATELET # BLD: 152 E9/L (ref 130–450)
PLATELET # BLD: 159 E9/L (ref 130–450)
PLATELET # BLD: 171 E9/L (ref 130–450)
PLATELET # BLD: 199 E9/L (ref 130–450)
PLATELET # BLD: 207 E9/L (ref 130–450)
PLATELET # BLD: 210 E9/L (ref 130–450)
PLATELET # BLD: 220 E9/L (ref 130–450)
PLATELET # BLD: 238 E9/L (ref 130–450)
PLATELET # BLD: 251 E9/L (ref 130–450)
PLATELET # BLD: 259 E9/L (ref 130–450)
PLATELET # BLD: 272 E9/L (ref 130–450)
PLATELET # BLD: 289 E9/L (ref 130–450)
PLATELET # BLD: 293 E9/L (ref 130–450)
PMV BLD AUTO: 10.1 FL (ref 7–12)
PMV BLD AUTO: 10.4 FL (ref 7–12)
PMV BLD AUTO: 10.5 FL (ref 7–12)
PMV BLD AUTO: 10.7 FL (ref 7–12)
PMV BLD AUTO: 10.8 FL (ref 7–12)
PMV BLD AUTO: 11.2 FL (ref 7–12)
PMV BLD AUTO: 11.4 FL (ref 7–12)
PMV BLD AUTO: 11.5 FL (ref 7–12)
PMV BLD AUTO: 11.6 FL (ref 7–12)
PMV BLD AUTO: 11.6 FL (ref 7–12)
PMV BLD AUTO: 11.8 FL (ref 7–12)
PMV BLD AUTO: 11.9 FL (ref 7–12)
PO2: 109.4 MMHG (ref 60–100)
PO2: 130.9 MMHG (ref 60–100)
PO2: 57.8 MMHG (ref 60–100)
PO2: 69.1 MMHG (ref 60–100)
PO2: 76.8 MMHG (ref 60–100)
PO2: 82 MMHG (ref 60–100)
PO2: 84 MMHG (ref 60–100)
PO2: 84.4 MMHG (ref 60–100)
PO2: 87.9 MMHG (ref 60–100)
PO2: 91.1 MMHG (ref 60–100)
PO2: 91.8 MMHG (ref 60–100)
POTASSIUM REFLEX MAGNESIUM: 3.9 MMOL/L (ref 3.5–5)
POTASSIUM REFLEX MAGNESIUM: 4.1 MMOL/L (ref 3.5–5)
POTASSIUM REFLEX MAGNESIUM: 4.1 MMOL/L (ref 3.5–5)
POTASSIUM REFLEX MAGNESIUM: 4.2 MMOL/L (ref 3.5–5)
POTASSIUM REFLEX MAGNESIUM: 4.3 MMOL/L (ref 3.5–5)
POTASSIUM REFLEX MAGNESIUM: 4.4 MMOL/L (ref 3.5–5)
POTASSIUM REFLEX MAGNESIUM: 4.5 MMOL/L (ref 3.5–5)
POTASSIUM REFLEX MAGNESIUM: 4.9 MMOL/L (ref 3.5–5)
POTASSIUM SERPL-SCNC: 4 MMOL/L (ref 3.5–5)
POTASSIUM SERPL-SCNC: 4.1 MMOL/L (ref 3.5–5)
POTASSIUM SERPL-SCNC: 4.1 MMOL/L (ref 3.5–5)
POTASSIUM SERPL-SCNC: 4.3 MMOL/L (ref 3.5–5)
POTASSIUM SERPL-SCNC: 4.3 MMOL/L (ref 3.5–5)
POTASSIUM SERPL-SCNC: 4.7 MMOL/L (ref 3.5–5)
PRO-BNP: 1157 PG/ML (ref 0–125)
PRO-BNP: 814 PG/ML (ref 0–125)
PROCALCITONIN: 0.04 NG/ML (ref 0–0.08)
PROCALCITONIN: 0.04 NG/ML (ref 0–0.08)
PROCALCITONIN: 0.05 NG/ML (ref 0–0.08)
PROPOXYPHENE SCREEN: NOT DETECTED
PROTEIN UA: NEGATIVE MG/DL
PROTHROMBIN TIME, POC: 13.3
PROTHROMBIN TIME, POC: 16.1
PROTHROMBIN TIME, POC: 17.1
PROTHROMBIN TIME, POC: 20.1
PROTHROMBIN TIME, POC: 20.9
PROTHROMBIN TIME, POC: 22
PROTHROMBIN TIME, POC: 22.7
PROTHROMBIN TIME, POC: 23.4
PROTHROMBIN TIME, POC: 27.5
PROTHROMBIN TIME, POC: 28.2
PROTHROMBIN TIME, POC: 28.2
PROTHROMBIN TIME, POC: 29.4
PROTHROMBIN TIME, POC: 29.9
PROTHROMBIN TIME, POC: 38.5
PROTHROMBIN TIME, POC: 40.6
PROTHROMBIN TIME, POC: 41.8
PROTHROMBIN TIME, POC: 43.3
PROTHROMBIN TIME, POC: NORMAL
PROTHROMBIN TIME: 18.9 SEC (ref 9.3–12.4)
PROTHROMBIN TIME: 19.5 SEC (ref 9.3–12.4)
PROTHROMBIN TIME: 21.9 SEC (ref 9.3–12.4)
PROTHROMBIN TIME: 23.6 SEC (ref 9.3–12.4)
PROTHROMBIN TIME: 23.9 SEC (ref 9.3–12.4)
PROTHROMBIN TIME: 28.5 SEC (ref 9.3–12.4)
PROTHROMBIN TIME: 29.7 SEC (ref 9.3–12.4)
PROTHROMBIN TIME: 30.3 SEC (ref 9.3–12.4)
PROTHROMBIN TIME: 32.8 SEC (ref 9.3–12.4)
PROTHROMBIN TIME: 33.5 SEC (ref 9.3–12.4)
PROTHROMBIN TIME: 33.6 SEC (ref 9.3–12.4)
PROTHROMBIN TIME: 34.9 SEC (ref 9.3–12.4)
PROTHROMBIN TIME: 35 SEC (ref 9.3–12.4)
PROTHROMBIN TIME: 38.3 SEC (ref 9.3–12.4)
PROTHROMBIN TIME: 39.6 SEC (ref 9.3–12.4)
PS: 12 CMH20
PS: 15 CMH20
PS: 20 CMH20
PS: 8 CMH20
RBC # BLD: 3.7 E12/L (ref 3.8–5.8)
RBC # BLD: 3.75 E12/L (ref 3.8–5.8)
RBC # BLD: 3.93 E12/L (ref 3.8–5.8)
RBC # BLD: 3.98 E12/L (ref 3.8–5.8)
RBC # BLD: 4.23 E12/L (ref 3.8–5.8)
RBC # BLD: 4.31 E12/L (ref 3.8–5.8)
RBC # BLD: 4.32 E12/L (ref 3.8–5.8)
RBC # BLD: 4.4 E12/L (ref 3.8–5.8)
RBC # BLD: 4.53 E12/L (ref 3.8–5.8)
RBC # BLD: 4.54 E12/L (ref 3.8–5.8)
RBC # BLD: 4.55 E12/L (ref 3.8–5.8)
RBC # BLD: 4.64 E12/L (ref 3.8–5.8)
RBC # BLD: 4.64 E12/L (ref 3.8–5.8)
RBC # BLD: 4.88 E12/L (ref 3.8–5.8)
RBC # BLD: 5 E12/L (ref 3.8–5.8)
RBC # BLD: NORMAL 10*6/UL
RBC # BLD: NORMAL 10*6/UL
RI(T): 1.07
RI(T): 1.09
RI(T): 1.14
RI(T): 1.6
RI(T): 165 %
RI(T): 172 %
RI(T): 324 %
RI(T): 66 %
RR MECHANICAL: 16 B/MIN
SMEAR, RESPIRATORY: ABNORMAL
SODIUM BLD-SCNC: 135 MMOL/L (ref 132–146)
SODIUM BLD-SCNC: 139 MMOL/L (ref 132–146)
SODIUM BLD-SCNC: 139 MMOL/L (ref 132–146)
SODIUM BLD-SCNC: 140 MMOL/L (ref 132–146)
SODIUM BLD-SCNC: 141 MMOL/L (ref 132–146)
SODIUM BLD-SCNC: 141 MMOL/L (ref 132–146)
SODIUM BLD-SCNC: 142 MMOL/L (ref 132–146)
SODIUM BLD-SCNC: 143 MMOL/L (ref 132–146)
SODIUM BLD-SCNC: 144 MMOL/L (ref 132–146)
SOURCE, BLOOD GAS: ABNORMAL
SPECIFIC GRAVITY UA: <=1.005 (ref 1–1.03)
STREP PNEUMONIAE ANTIGEN, URINE: NORMAL
THB: 11.7 G/DL (ref 11.5–16.5)
THB: 13.4 G/DL (ref 11.5–16.5)
THB: 13.4 G/DL (ref 11.5–16.5)
THB: 13.5 G/DL (ref 11.5–16.5)
THB: 13.5 G/DL (ref 11.5–16.5)
THB: 13.6 G/DL (ref 11.5–16.5)
THB: 13.7 G/DL (ref 11.5–16.5)
THB: 13.8 G/DL (ref 11.5–16.5)
THB: 13.8 G/DL (ref 11.5–16.5)
THB: 13.9 G/DL (ref 11.5–16.5)
THB: 14 G/DL (ref 11.5–16.5)
TIME ANALYZED: 2103
TIME ANALYZED: 2114
TIME ANALYZED: 2246
TIME ANALYZED: 23
TIME ANALYZED: 2319
TIME ANALYZED: 420
TIME ANALYZED: 538
TIME ANALYZED: 601
TIME ANALYZED: 609
TIME ANALYZED: 609
TIME ANALYZED: 904
TOTAL CK: 139 U/L (ref 20–200)
TOTAL CK: 173 U/L (ref 20–200)
TOTAL PROTEIN: 6.2 G/DL (ref 6.4–8.3)
TOTAL PROTEIN: 6.3 G/DL (ref 6.4–8.3)
TOTAL PROTEIN: 6.9 G/DL (ref 6.4–8.3)
TOTAL PROTEIN: 7 G/DL (ref 6.4–8.3)
TOTAL PROTEIN: 7.1 G/DL (ref 6.4–8.3)
TOTAL PROTEIN: 7.7 G/DL (ref 6.4–8.3)
TRIGL SERPL-MCNC: 127 MG/DL (ref 0–149)
TROPONIN: <0.01 NG/ML (ref 0–0.03)
TSH SERPL DL<=0.05 MIU/L-ACNC: 0.37 UIU/ML (ref 0.27–4.2)
TSH SERPL DL<=0.05 MIU/L-ACNC: 0.54 UIU/ML (ref 0.27–4.2)
URINE CULTURE, ROUTINE: NORMAL
URINE CULTURE, ROUTINE: NORMAL
UROBILINOGEN, URINE: 0.2 E.U./DL
VLDLC SERPL CALC-MCNC: 25 MG/DL
VT MECHANICAL: 450 ML
VT MECHANICAL: 450 ML
VT MECHANICAL: 500 ML
WBC # BLD: 10 E9/L (ref 4.5–11.5)
WBC # BLD: 11.3 E9/L (ref 4.5–11.5)
WBC # BLD: 11.6 E9/L (ref 4.5–11.5)
WBC # BLD: 12.3 E9/L (ref 4.5–11.5)
WBC # BLD: 13.4 E9/L (ref 4.5–11.5)
WBC # BLD: 13.9 E9/L (ref 4.5–11.5)
WBC # BLD: 14 E9/L (ref 4.5–11.5)
WBC # BLD: 15.2 E9/L (ref 4.5–11.5)
WBC # BLD: 7.4 E9/L (ref 4.5–11.5)
WBC # BLD: 7.5 E9/L (ref 4.5–11.5)
WBC # BLD: 7.7 E9/L (ref 4.5–11.5)
WBC # BLD: 8.6 E9/L (ref 4.5–11.5)
WBC # BLD: 8.8 E9/L (ref 4.5–11.5)
WBC # BLD: 8.9 E9/L (ref 4.5–11.5)
WBC # BLD: 9 E9/L (ref 4.5–11.5)
WHITE BLOOD CELLS (WBC), STOOL: NORMAL

## 2018-01-01 PROCEDURE — 6370000000 HC RX 637 (ALT 250 FOR IP): Performed by: INTERNAL MEDICINE

## 2018-01-01 PROCEDURE — 6360000002 HC RX W HCPCS: Performed by: FAMILY MEDICINE

## 2018-01-01 PROCEDURE — G0378 HOSPITAL OBSERVATION PER HR: HCPCS

## 2018-01-01 PROCEDURE — 99239 HOSP IP/OBS DSCHRG MGMT >30: CPT | Performed by: FAMILY MEDICINE

## 2018-01-01 PROCEDURE — 80053 COMPREHEN METABOLIC PANEL: CPT

## 2018-01-01 PROCEDURE — 82962 GLUCOSE BLOOD TEST: CPT

## 2018-01-01 PROCEDURE — 85610 PROTHROMBIN TIME: CPT | Performed by: FAMILY MEDICINE

## 2018-01-01 PROCEDURE — 94640 AIRWAY INHALATION TREATMENT: CPT

## 2018-01-01 PROCEDURE — G8427 DOCREV CUR MEDS BY ELIG CLIN: HCPCS | Performed by: NURSE PRACTITIONER

## 2018-01-01 PROCEDURE — 85025 COMPLETE CBC W/AUTO DIFF WBC: CPT

## 2018-01-01 PROCEDURE — 85610 PROTHROMBIN TIME: CPT

## 2018-01-01 PROCEDURE — 83880 ASSAY OF NATRIURETIC PEPTIDE: CPT

## 2018-01-01 PROCEDURE — 99213 OFFICE O/P EST LOW 20 MIN: CPT | Performed by: SURGERY

## 2018-01-01 PROCEDURE — 83735 ASSAY OF MAGNESIUM: CPT

## 2018-01-01 PROCEDURE — 84165 PROTEIN E-PHORESIS SERUM: CPT

## 2018-01-01 PROCEDURE — 1036F TOBACCO NON-USER: CPT | Performed by: INTERNAL MEDICINE

## 2018-01-01 PROCEDURE — 70450 CT HEAD/BRAIN W/O DYE: CPT

## 2018-01-01 PROCEDURE — 96374 THER/PROPH/DIAG INJ IV PUSH: CPT

## 2018-01-01 PROCEDURE — 6370000000 HC RX 637 (ALT 250 FOR IP): Performed by: EMERGENCY MEDICINE

## 2018-01-01 PROCEDURE — G8482 FLU IMMUNIZE ORDER/ADMIN: HCPCS | Performed by: FAMILY MEDICINE

## 2018-01-01 PROCEDURE — 93010 ELECTROCARDIOGRAM REPORT: CPT | Performed by: INTERNAL MEDICINE

## 2018-01-01 PROCEDURE — 94660 CPAP INITIATION&MGMT: CPT

## 2018-01-01 PROCEDURE — 87798 DETECT AGENT NOS DNA AMP: CPT

## 2018-01-01 PROCEDURE — 3017F COLORECTAL CA SCREEN DOC REV: CPT | Performed by: CLINICAL NURSE SPECIALIST

## 2018-01-01 PROCEDURE — 2700000000 HC OXYGEN THERAPY PER DAY

## 2018-01-01 PROCEDURE — 87040 BLOOD CULTURE FOR BACTERIA: CPT

## 2018-01-01 PROCEDURE — 6370000000 HC RX 637 (ALT 250 FOR IP): Performed by: FAMILY MEDICINE

## 2018-01-01 PROCEDURE — 80048 BASIC METABOLIC PNL TOTAL CA: CPT

## 2018-01-01 PROCEDURE — 99291 CRITICAL CARE FIRST HOUR: CPT

## 2018-01-01 PROCEDURE — 2580000003 HC RX 258: Performed by: INTERNAL MEDICINE

## 2018-01-01 PROCEDURE — 94664 DEMO&/EVAL PT USE INHALER: CPT

## 2018-01-01 PROCEDURE — 96365 THER/PROPH/DIAG IV INF INIT: CPT

## 2018-01-01 PROCEDURE — 87088 URINE BACTERIA CULTURE: CPT

## 2018-01-01 PROCEDURE — 80307 DRUG TEST PRSMV CHEM ANLYZR: CPT

## 2018-01-01 PROCEDURE — 93793 ANTICOAG MGMT PT WARFARIN: CPT | Performed by: FAMILY MEDICINE

## 2018-01-01 PROCEDURE — 83605 ASSAY OF LACTIC ACID: CPT

## 2018-01-01 PROCEDURE — 2000000000 HC ICU R&B

## 2018-01-01 PROCEDURE — 2580000003 HC RX 258: Performed by: FAMILY MEDICINE

## 2018-01-01 PROCEDURE — A0426 ALS 1: HCPCS

## 2018-01-01 PROCEDURE — 96366 THER/PROPH/DIAG IV INF ADDON: CPT

## 2018-01-01 PROCEDURE — 87486 CHLMYD PNEUM DNA AMP PROBE: CPT

## 2018-01-01 PROCEDURE — 84145 PROCALCITONIN (PCT): CPT

## 2018-01-01 PROCEDURE — 93005 ELECTROCARDIOGRAM TRACING: CPT | Performed by: INTERNAL MEDICINE

## 2018-01-01 PROCEDURE — 1111F DSCHRG MED/CURRENT MED MERGE: CPT | Performed by: FAMILY MEDICINE

## 2018-01-01 PROCEDURE — 97530 THERAPEUTIC ACTIVITIES: CPT

## 2018-01-01 PROCEDURE — 87450 HC DIRECT STREP B ANTIGEN: CPT

## 2018-01-01 PROCEDURE — 96375 TX/PRO/DX INJ NEW DRUG ADDON: CPT

## 2018-01-01 PROCEDURE — 0042T CT BRAIN PERFUSION: CPT

## 2018-01-01 PROCEDURE — 4004F PT TOBACCO SCREEN RCVD TLK: CPT | Performed by: FAMILY MEDICINE

## 2018-01-01 PROCEDURE — G8417 CALC BMI ABV UP PARAM F/U: HCPCS | Performed by: FAMILY MEDICINE

## 2018-01-01 PROCEDURE — 97110 THERAPEUTIC EXERCISES: CPT

## 2018-01-01 PROCEDURE — G8598 ASA/ANTIPLAT THER USED: HCPCS | Performed by: NURSE PRACTITIONER

## 2018-01-01 PROCEDURE — 36415 COLL VENOUS BLD VENIPUNCTURE: CPT

## 2018-01-01 PROCEDURE — 90688 IIV4 VACCINE SPLT 0.5 ML IM: CPT | Performed by: FAMILY MEDICINE

## 2018-01-01 PROCEDURE — 71045 X-RAY EXAM CHEST 1 VIEW: CPT

## 2018-01-01 PROCEDURE — 6360000002 HC RX W HCPCS: Performed by: EMERGENCY MEDICINE

## 2018-01-01 PROCEDURE — 71046 X-RAY EXAM CHEST 2 VIEWS: CPT

## 2018-01-01 PROCEDURE — G8598 ASA/ANTIPLAT THER USED: HCPCS | Performed by: SURGERY

## 2018-01-01 PROCEDURE — G8427 DOCREV CUR MEDS BY ELIG CLIN: HCPCS | Performed by: SURGERY

## 2018-01-01 PROCEDURE — 87503 INFLUENZA DNA AMP PROB ADDL: CPT

## 2018-01-01 PROCEDURE — 96361 HYDRATE IV INFUSION ADD-ON: CPT

## 2018-01-01 PROCEDURE — 82140 ASSAY OF AMMONIA: CPT

## 2018-01-01 PROCEDURE — 99214 OFFICE O/P EST MOD 30 MIN: CPT | Performed by: FAMILY MEDICINE

## 2018-01-01 PROCEDURE — 82805 BLOOD GASES W/O2 SATURATION: CPT

## 2018-01-01 PROCEDURE — G8427 DOCREV CUR MEDS BY ELIG CLIN: HCPCS | Performed by: FAMILY MEDICINE

## 2018-01-01 PROCEDURE — 84100 ASSAY OF PHOSPHORUS: CPT

## 2018-01-01 PROCEDURE — 1036F TOBACCO NON-USER: CPT | Performed by: NURSE PRACTITIONER

## 2018-01-01 PROCEDURE — 94002 VENT MGMT INPAT INIT DAY: CPT

## 2018-01-01 PROCEDURE — 3017F COLORECTAL CA SCREEN DOC REV: CPT | Performed by: NURSE PRACTITIONER

## 2018-01-01 PROCEDURE — 93306 TTE W/DOPPLER COMPLETE: CPT

## 2018-01-01 PROCEDURE — 99213 OFFICE O/P EST LOW 20 MIN: CPT | Performed by: PHYSICIAN ASSISTANT

## 2018-01-01 PROCEDURE — 2500000003 HC RX 250 WO HCPCS: Performed by: EMERGENCY MEDICINE

## 2018-01-01 PROCEDURE — 90471 IMMUNIZATION ADMIN: CPT

## 2018-01-01 PROCEDURE — 94799 UNLISTED PULMONARY SVC/PX: CPT

## 2018-01-01 PROCEDURE — 82550 ASSAY OF CK (CPK): CPT

## 2018-01-01 PROCEDURE — 87077 CULTURE AEROBIC IDENTIFY: CPT

## 2018-01-01 PROCEDURE — G8598 ASA/ANTIPLAT THER USED: HCPCS | Performed by: CLINICAL NURSE SPECIALIST

## 2018-01-01 PROCEDURE — 92610 EVALUATE SWALLOWING FUNCTION: CPT | Performed by: SPEECH-LANGUAGE PATHOLOGIST

## 2018-01-01 PROCEDURE — 3023F SPIROM DOC REV: CPT | Performed by: FAMILY MEDICINE

## 2018-01-01 PROCEDURE — G8598 ASA/ANTIPLAT THER USED: HCPCS | Performed by: FAMILY MEDICINE

## 2018-01-01 PROCEDURE — 87205 SMEAR GRAM STAIN: CPT

## 2018-01-01 PROCEDURE — 1036F TOBACCO NON-USER: CPT | Performed by: CLINICAL NURSE SPECIALIST

## 2018-01-01 PROCEDURE — 85027 COMPLETE CBC AUTOMATED: CPT

## 2018-01-01 PROCEDURE — 70496 CT ANGIOGRAPHY HEAD: CPT

## 2018-01-01 PROCEDURE — 84484 ASSAY OF TROPONIN QUANT: CPT

## 2018-01-01 PROCEDURE — 87502 INFLUENZA DNA AMP PROBE: CPT

## 2018-01-01 PROCEDURE — 99215 OFFICE O/P EST HI 40 MIN: CPT | Performed by: CLINICAL NURSE SPECIALIST

## 2018-01-01 PROCEDURE — 1036F TOBACCO NON-USER: CPT | Performed by: SURGERY

## 2018-01-01 PROCEDURE — G8420 CALC BMI NORM PARAMETERS: HCPCS | Performed by: FAMILY MEDICINE

## 2018-01-01 PROCEDURE — 99222 1ST HOSP IP/OBS MODERATE 55: CPT | Performed by: FAMILY MEDICINE

## 2018-01-01 PROCEDURE — 87070 CULTURE OTHR SPECIMN AEROBIC: CPT

## 2018-01-01 PROCEDURE — 3017F COLORECTAL CA SCREEN DOC REV: CPT | Performed by: FAMILY MEDICINE

## 2018-01-01 PROCEDURE — 93000 ELECTROCARDIOGRAM COMPLETE: CPT | Performed by: INTERNAL MEDICINE

## 2018-01-01 PROCEDURE — 6360000002 HC RX W HCPCS: Performed by: INTERNAL MEDICINE

## 2018-01-01 PROCEDURE — 1200000000 HC SEMI PRIVATE

## 2018-01-01 PROCEDURE — 99285 EMERGENCY DEPT VISIT HI MDM: CPT

## 2018-01-01 PROCEDURE — G0008 ADMIN INFLUENZA VIRUS VAC: HCPCS | Performed by: FAMILY MEDICINE

## 2018-01-01 PROCEDURE — G8417 CALC BMI ABV UP PARAM F/U: HCPCS | Performed by: NURSE PRACTITIONER

## 2018-01-01 PROCEDURE — 92610 EVALUATE SWALLOWING FUNCTION: CPT

## 2018-01-01 PROCEDURE — 74177 CT ABD & PELVIS W/CONTRAST: CPT

## 2018-01-01 PROCEDURE — 6360000004 HC RX CONTRAST MEDICATION: Performed by: RADIOLOGY

## 2018-01-01 PROCEDURE — 87329 GIARDIA AG IA: CPT

## 2018-01-01 PROCEDURE — 97162 PT EVAL MOD COMPLEX 30 MIN: CPT

## 2018-01-01 PROCEDURE — G8427 DOCREV CUR MEDS BY ELIG CLIN: HCPCS | Performed by: INTERNAL MEDICINE

## 2018-01-01 PROCEDURE — 87081 CULTURE SCREEN ONLY: CPT

## 2018-01-01 PROCEDURE — G8978 MOBILITY CURRENT STATUS: HCPCS

## 2018-01-01 PROCEDURE — 51702 INSERT TEMP BLADDER CATH: CPT

## 2018-01-01 PROCEDURE — 81003 URINALYSIS AUTO W/O SCOPE: CPT

## 2018-01-01 PROCEDURE — 99232 SBSQ HOSP IP/OBS MODERATE 35: CPT | Performed by: FAMILY MEDICINE

## 2018-01-01 PROCEDURE — 1036F TOBACCO NON-USER: CPT | Performed by: FAMILY MEDICINE

## 2018-01-01 PROCEDURE — 87581 M.PNEUMON DNA AMP PROBE: CPT

## 2018-01-01 PROCEDURE — 82553 CREATINE MB FRACTION: CPT

## 2018-01-01 PROCEDURE — 83690 ASSAY OF LIPASE: CPT

## 2018-01-01 PROCEDURE — G8420 CALC BMI NORM PARAMETERS: HCPCS | Performed by: CLINICAL NURSE SPECIALIST

## 2018-01-01 PROCEDURE — G8598 ASA/ANTIPLAT THER USED: HCPCS | Performed by: INTERNAL MEDICINE

## 2018-01-01 PROCEDURE — 99238 HOSP IP/OBS DSCHRG MGMT 30/<: CPT | Performed by: FAMILY MEDICINE

## 2018-01-01 PROCEDURE — G8427 DOCREV CUR MEDS BY ELIG CLIN: HCPCS | Performed by: CLINICAL NURSE SPECIALIST

## 2018-01-01 PROCEDURE — 2060000000 HC ICU INTERMEDIATE R&B

## 2018-01-01 PROCEDURE — 84443 ASSAY THYROID STIM HORMONE: CPT

## 2018-01-01 PROCEDURE — 96376 TX/PRO/DX INJ SAME DRUG ADON: CPT

## 2018-01-01 PROCEDURE — 99223 1ST HOSP IP/OBS HIGH 75: CPT | Performed by: INTERNAL MEDICINE

## 2018-01-01 PROCEDURE — G8996 SWALLOW CURRENT STATUS: HCPCS

## 2018-01-01 PROCEDURE — 6360000002 HC RX W HCPCS

## 2018-01-01 PROCEDURE — 93880 EXTRACRANIAL BILAT STUDY: CPT

## 2018-01-01 PROCEDURE — 84132 ASSAY OF SERUM POTASSIUM: CPT

## 2018-01-01 PROCEDURE — G8417 CALC BMI ABV UP PARAM F/U: HCPCS | Performed by: SURGERY

## 2018-01-01 PROCEDURE — 87633 RESP VIRUS 12-25 TARGETS: CPT

## 2018-01-01 PROCEDURE — 99284 EMERGENCY DEPT VISIT MOD MDM: CPT

## 2018-01-01 PROCEDURE — 2500000003 HC RX 250 WO HCPCS: Performed by: FAMILY MEDICINE

## 2018-01-01 PROCEDURE — 93005 ELECTROCARDIOGRAM TRACING: CPT | Performed by: EMERGENCY MEDICINE

## 2018-01-01 PROCEDURE — 94667 MNPJ CHEST WALL 1ST: CPT

## 2018-01-01 PROCEDURE — 80061 LIPID PANEL: CPT

## 2018-01-01 PROCEDURE — 85730 THROMBOPLASTIN TIME PARTIAL: CPT

## 2018-01-01 PROCEDURE — 99213 OFFICE O/P EST LOW 20 MIN: CPT | Performed by: FAMILY MEDICINE

## 2018-01-01 PROCEDURE — 36415 COLL VENOUS BLD VENIPUNCTURE: CPT | Performed by: FAMILY MEDICINE

## 2018-01-01 PROCEDURE — 93005 ELECTROCARDIOGRAM TRACING: CPT | Performed by: FAMILY MEDICINE

## 2018-01-01 PROCEDURE — 3017F COLORECTAL CA SCREEN DOC REV: CPT | Performed by: SURGERY

## 2018-01-01 PROCEDURE — 2580000003 HC RX 258: Performed by: EMERGENCY MEDICINE

## 2018-01-01 PROCEDURE — G8997 SWALLOW GOAL STATUS: HCPCS

## 2018-01-01 PROCEDURE — 73110 X-RAY EXAM OF WRIST: CPT

## 2018-01-01 PROCEDURE — A0425 GROUND MILEAGE: HCPCS

## 2018-01-01 PROCEDURE — 71101 X-RAY EXAM UNILAT RIBS/CHEST: CPT

## 2018-01-01 PROCEDURE — G8926 SPIRO NO PERF OR DOC: HCPCS | Performed by: FAMILY MEDICINE

## 2018-01-01 PROCEDURE — 5A1945Z RESPIRATORY VENTILATION, 24-96 CONSECUTIVE HOURS: ICD-10-PCS | Performed by: INTERNAL MEDICINE

## 2018-01-01 PROCEDURE — G8979 MOBILITY GOAL STATUS: HCPCS

## 2018-01-01 PROCEDURE — 99233 SBSQ HOSP IP/OBS HIGH 50: CPT | Performed by: INTERNAL MEDICINE

## 2018-01-01 PROCEDURE — 89055 LEUKOCYTE ASSESSMENT FECAL: CPT

## 2018-01-01 PROCEDURE — 2500000003 HC RX 250 WO HCPCS

## 2018-01-01 PROCEDURE — 99222 1ST HOSP IP/OBS MODERATE 55: CPT | Performed by: SURGERY

## 2018-01-01 PROCEDURE — 96367 TX/PROPH/DG ADDL SEQ IV INF: CPT

## 2018-01-01 PROCEDURE — 94668 MNPJ CHEST WALL SBSQ: CPT

## 2018-01-01 PROCEDURE — 94761 N-INVAS EAR/PLS OXIMETRY MLT: CPT

## 2018-01-01 PROCEDURE — 99214 OFFICE O/P EST MOD 30 MIN: CPT | Performed by: INTERNAL MEDICINE

## 2018-01-01 PROCEDURE — 97166 OT EVAL MOD COMPLEX 45 MIN: CPT

## 2018-01-01 PROCEDURE — C9113 INJ PANTOPRAZOLE SODIUM, VIA: HCPCS | Performed by: INTERNAL MEDICINE

## 2018-01-01 PROCEDURE — 99213 OFFICE O/P EST LOW 20 MIN: CPT | Performed by: NURSE PRACTITIONER

## 2018-01-01 PROCEDURE — 87184 SC STD DISK METHOD PER PLATE: CPT

## 2018-01-01 PROCEDURE — 73130 X-RAY EXAM OF HAND: CPT

## 2018-01-01 PROCEDURE — G8988 SELF CARE GOAL STATUS: HCPCS

## 2018-01-01 PROCEDURE — G8987 SELF CARE CURRENT STATUS: HCPCS

## 2018-01-01 PROCEDURE — G8417 CALC BMI ABV UP PARAM F/U: HCPCS | Performed by: INTERNAL MEDICINE

## 2018-01-01 PROCEDURE — 87206 SMEAR FLUORESCENT/ACID STAI: CPT

## 2018-01-01 PROCEDURE — 87045 FECES CULTURE AEROBIC BACT: CPT

## 2018-01-01 PROCEDURE — 31500 INSERT EMERGENCY AIRWAY: CPT

## 2018-01-01 PROCEDURE — 99232 SBSQ HOSP IP/OBS MODERATE 35: CPT | Performed by: INTERNAL MEDICINE

## 2018-01-01 PROCEDURE — 90715 TDAP VACCINE 7 YRS/> IM: CPT

## 2018-01-01 PROCEDURE — 2500000003 HC RX 250 WO HCPCS: Performed by: INTERNAL MEDICINE

## 2018-01-01 PROCEDURE — 70498 CT ANGIOGRAPHY NECK: CPT

## 2018-01-01 PROCEDURE — 3017F COLORECTAL CA SCREEN DOC REV: CPT | Performed by: INTERNAL MEDICINE

## 2018-01-01 RX ORDER — POTASSIUM CHLORIDE 750 MG/1
10 TABLET, FILM COATED, EXTENDED RELEASE ORAL DAILY
COMMUNITY
Start: 2018-01-01 | End: 2018-01-01

## 2018-01-01 RX ORDER — NALOXONE HYDROCHLORIDE 0.4 MG/ML
2 INJECTION, SOLUTION INTRAMUSCULAR; INTRAVENOUS; SUBCUTANEOUS ONCE
Status: COMPLETED | OUTPATIENT
Start: 2018-01-01 | End: 2018-01-01

## 2018-01-01 RX ORDER — OMEPRAZOLE 40 MG/1
CAPSULE, DELAYED RELEASE ORAL
Qty: 90 CAPSULE | Refills: 1 | Status: SHIPPED | OUTPATIENT
Start: 2018-01-01 | End: 2018-01-01 | Stop reason: SDUPTHER

## 2018-01-01 RX ORDER — MEMANTINE HYDROCHLORIDE 10 MG/1
10 TABLET ORAL 2 TIMES DAILY
Status: DISCONTINUED | OUTPATIENT
Start: 2018-01-01 | End: 2018-01-01 | Stop reason: HOSPADM

## 2018-01-01 RX ORDER — FORMOTEROL FUMARATE 20 UG/2ML
20 SOLUTION RESPIRATORY (INHALATION) 2 TIMES DAILY
Status: DISCONTINUED | OUTPATIENT
Start: 2018-01-01 | End: 2018-01-01 | Stop reason: HOSPADM

## 2018-01-01 RX ORDER — METHYLPREDNISOLONE SODIUM SUCCINATE 40 MG/ML
40 INJECTION, POWDER, LYOPHILIZED, FOR SOLUTION INTRAMUSCULAR; INTRAVENOUS EVERY 8 HOURS
Status: DISCONTINUED | OUTPATIENT
Start: 2018-01-01 | End: 2018-01-01

## 2018-01-01 RX ORDER — PREDNISONE 10 MG/1
30 TABLET ORAL DAILY
Qty: 30 TABLET | Refills: 0 | Status: SHIPPED | OUTPATIENT
Start: 2018-01-01 | End: 2018-01-01

## 2018-01-01 RX ORDER — METOPROLOL SUCCINATE 100 MG/1
100 TABLET, EXTENDED RELEASE ORAL 2 TIMES DAILY
Status: DISCONTINUED | OUTPATIENT
Start: 2018-01-01 | End: 2018-01-01 | Stop reason: HOSPADM

## 2018-01-01 RX ORDER — LOPERAMIDE HYDROCHLORIDE 2 MG/1
2 CAPSULE ORAL 2 TIMES DAILY PRN
Status: DISCONTINUED | OUTPATIENT
Start: 2018-01-01 | End: 2019-01-01 | Stop reason: HOSPADM

## 2018-01-01 RX ORDER — ACETAMINOPHEN 325 MG/1
650 TABLET ORAL ONCE
Status: COMPLETED | OUTPATIENT
Start: 2018-01-01 | End: 2018-01-01

## 2018-01-01 RX ORDER — PREDNISONE 10 MG/1
TABLET ORAL
Qty: 10 TABLET | Refills: 0 | Status: SHIPPED | OUTPATIENT
Start: 2018-01-01 | End: 2018-01-01 | Stop reason: HOSPADM

## 2018-01-01 RX ORDER — PREDNISONE 10 MG/1
10 TABLET ORAL DAILY
Qty: 10 TABLET | Refills: 0 | Status: SHIPPED | OUTPATIENT
Start: 2018-01-01 | End: 2018-01-01

## 2018-01-01 RX ORDER — METHYLPREDNISOLONE SODIUM SUCCINATE 40 MG/ML
40 INJECTION, POWDER, LYOPHILIZED, FOR SOLUTION INTRAMUSCULAR; INTRAVENOUS EVERY 12 HOURS
Status: DISCONTINUED | OUTPATIENT
Start: 2018-01-01 | End: 2018-01-01

## 2018-01-01 RX ORDER — ONDANSETRON 2 MG/ML
4 INJECTION INTRAMUSCULAR; INTRAVENOUS ONCE
Status: COMPLETED | OUTPATIENT
Start: 2018-01-01 | End: 2018-01-01

## 2018-01-01 RX ORDER — PRIMIDONE 50 MG/1
100 TABLET ORAL 2 TIMES DAILY
Qty: 90 TABLET | Refills: 3 | Status: SHIPPED | OUTPATIENT
Start: 2018-01-01 | End: 2018-01-01

## 2018-01-01 RX ORDER — WARFARIN SODIUM 4 MG/1
4 TABLET ORAL DAILY
Status: CANCELLED | OUTPATIENT
Start: 2018-01-01

## 2018-01-01 RX ORDER — SODIUM CHLORIDE 0.9 % (FLUSH) 0.9 %
10 SYRINGE (ML) INJECTION EVERY 12 HOURS SCHEDULED
Status: DISCONTINUED | OUTPATIENT
Start: 2018-01-01 | End: 2018-01-01 | Stop reason: HOSPADM

## 2018-01-01 RX ORDER — SUCCINYLCHOLINE CHLORIDE 20 MG/ML
INJECTION INTRAMUSCULAR; INTRAVENOUS
Status: COMPLETED
Start: 2018-01-01 | End: 2018-01-01

## 2018-01-01 RX ORDER — CEFTRIAXONE 2 G/1
2 INJECTION, POWDER, FOR SOLUTION INTRAMUSCULAR; INTRAVENOUS ONCE
Status: DISCONTINUED | OUTPATIENT
Start: 2018-01-01 | End: 2018-01-01

## 2018-01-01 RX ORDER — PANTOPRAZOLE SODIUM 40 MG/10ML
40 INJECTION, POWDER, LYOPHILIZED, FOR SOLUTION INTRAVENOUS DAILY
Status: DISCONTINUED | OUTPATIENT
Start: 2018-01-01 | End: 2018-01-01

## 2018-01-01 RX ORDER — TOPIRAMATE 25 MG/1
50 TABLET ORAL NIGHTLY
Status: DISCONTINUED | OUTPATIENT
Start: 2018-01-01 | End: 2018-01-01 | Stop reason: HOSPADM

## 2018-01-01 RX ORDER — PETROLATUM 420 MG/G
1 OINTMENT TOPICAL DAILY PRN
Status: DISCONTINUED | OUTPATIENT
Start: 2018-01-01 | End: 2018-01-01 | Stop reason: HOSPADM

## 2018-01-01 RX ORDER — AZITHROMYCIN 250 MG/1
500 TABLET, FILM COATED ORAL DAILY
Status: COMPLETED | OUTPATIENT
Start: 2018-01-01 | End: 2018-01-01

## 2018-01-01 RX ORDER — TAMSULOSIN HYDROCHLORIDE 0.4 MG/1
0.4 CAPSULE ORAL DAILY
Status: DISCONTINUED | OUTPATIENT
Start: 2018-01-01 | End: 2018-01-01 | Stop reason: HOSPADM

## 2018-01-01 RX ORDER — NITROGLYCERIN 0.4 MG/1
0.4 TABLET SUBLINGUAL EVERY 5 MIN PRN
Status: DISCONTINUED | OUTPATIENT
Start: 2018-01-01 | End: 2018-01-01 | Stop reason: HOSPADM

## 2018-01-01 RX ORDER — ALBUTEROL SULFATE 90 UG/1
2 AEROSOL, METERED RESPIRATORY (INHALATION) EVERY 6 HOURS PRN
Status: DISCONTINUED | OUTPATIENT
Start: 2018-01-01 | End: 2018-01-01 | Stop reason: HOSPADM

## 2018-01-01 RX ORDER — LEVOFLOXACIN 5 MG/ML
750 INJECTION, SOLUTION INTRAVENOUS EVERY 24 HOURS
Status: DISCONTINUED | OUTPATIENT
Start: 2018-01-01 | End: 2018-01-01

## 2018-01-01 RX ORDER — ASPIRIN 81 MG/1
81 TABLET, CHEWABLE ORAL EVERY MORNING
Status: DISCONTINUED | OUTPATIENT
Start: 2018-01-01 | End: 2018-01-01 | Stop reason: HOSPADM

## 2018-01-01 RX ORDER — IPRATROPIUM BROMIDE AND ALBUTEROL SULFATE 2.5; .5 MG/3ML; MG/3ML
1 SOLUTION RESPIRATORY (INHALATION) EVERY 6 HOURS PRN
Status: DISCONTINUED | OUTPATIENT
Start: 2018-01-01 | End: 2018-01-01 | Stop reason: HOSPADM

## 2018-01-01 RX ORDER — SODIUM CHLORIDE 9 MG/ML
INJECTION, SOLUTION INTRAVENOUS CONTINUOUS
Status: DISCONTINUED | OUTPATIENT
Start: 2018-01-01 | End: 2018-01-01

## 2018-01-01 RX ORDER — NALOXONE HYDROCHLORIDE 0.4 MG/ML
1 INJECTION, SOLUTION INTRAMUSCULAR; INTRAVENOUS; SUBCUTANEOUS ONCE
Status: COMPLETED | OUTPATIENT
Start: 2018-01-01 | End: 2018-01-01

## 2018-01-01 RX ORDER — DOXYCYCLINE HYCLATE 100 MG
100 TABLET ORAL 2 TIMES DAILY
Qty: 20 TABLET | Refills: 0 | Status: SHIPPED | OUTPATIENT
Start: 2018-01-01 | End: 2018-01-01

## 2018-01-01 RX ORDER — LEVOFLOXACIN 5 MG/ML
500 INJECTION, SOLUTION INTRAVENOUS EVERY 24 HOURS
Status: DISCONTINUED | OUTPATIENT
Start: 2018-01-01 | End: 2018-01-01

## 2018-01-01 RX ORDER — CEFUROXIME AXETIL 500 MG/1
500 TABLET ORAL EVERY 12 HOURS SCHEDULED
Qty: 20 TABLET | Refills: 0 | Status: SHIPPED | OUTPATIENT
Start: 2018-01-01 | End: 2018-01-01

## 2018-01-01 RX ORDER — BUDESONIDE 0.5 MG/2ML
0.5 INHALANT ORAL 2 TIMES DAILY
Status: DISCONTINUED | OUTPATIENT
Start: 2018-01-01 | End: 2018-01-01 | Stop reason: HOSPADM

## 2018-01-01 RX ORDER — ACETAMINOPHEN 325 MG/1
650 TABLET ORAL EVERY 6 HOURS PRN
Status: DISCONTINUED | OUTPATIENT
Start: 2018-01-01 | End: 2018-01-01 | Stop reason: HOSPADM

## 2018-01-01 RX ORDER — SERTRALINE HYDROCHLORIDE 100 MG/1
50 TABLET, FILM COATED ORAL DAILY
COMMUNITY
Start: 2018-01-01 | End: 2018-01-01 | Stop reason: ALTCHOICE

## 2018-01-01 RX ORDER — SODIUM CHLORIDE 0.9 % (FLUSH) 0.9 %
10 SYRINGE (ML) INJECTION PRN
Status: DISCONTINUED | OUTPATIENT
Start: 2018-01-01 | End: 2019-01-01 | Stop reason: HOSPADM

## 2018-01-01 RX ORDER — WARFARIN SODIUM 5 MG/1
TABLET ORAL
Qty: 90 TABLET | Refills: 5 | Status: ON HOLD | OUTPATIENT
Start: 2018-01-01 | End: 2018-01-01 | Stop reason: DRUGHIGH

## 2018-01-01 RX ORDER — BUDESONIDE 0.5 MG/2ML
0.5 INHALANT ORAL DAILY
Status: DISCONTINUED | OUTPATIENT
Start: 2018-01-01 | End: 2018-01-01 | Stop reason: HOSPADM

## 2018-01-01 RX ORDER — ATORVASTATIN CALCIUM 20 MG/1
80 TABLET, FILM COATED ORAL NIGHTLY
Status: DISCONTINUED | OUTPATIENT
Start: 2018-01-01 | End: 2018-01-01 | Stop reason: HOSPADM

## 2018-01-01 RX ORDER — FUROSEMIDE 40 MG/1
40 TABLET ORAL DAILY
Status: DISCONTINUED | OUTPATIENT
Start: 2018-01-01 | End: 2019-01-01 | Stop reason: HOSPADM

## 2018-01-01 RX ORDER — ACETAMINOPHEN 500 MG
1000 TABLET ORAL ONCE
Status: COMPLETED | OUTPATIENT
Start: 2018-01-01 | End: 2018-01-01

## 2018-01-01 RX ORDER — IPRATROPIUM BROMIDE AND ALBUTEROL SULFATE 2.5; .5 MG/3ML; MG/3ML
SOLUTION RESPIRATORY (INHALATION)
Qty: 1080 ML | Refills: 1 | Status: ON HOLD | OUTPATIENT
Start: 2018-01-01 | End: 2019-01-01 | Stop reason: HOSPADM

## 2018-01-01 RX ORDER — PREDNISONE 20 MG/1
40 TABLET ORAL
Qty: 10 TABLET | Refills: 0 | Status: SHIPPED | OUTPATIENT
Start: 2018-01-01 | End: 2018-01-01

## 2018-01-01 RX ORDER — LOPERAMIDE HYDROCHLORIDE 2 MG/1
2 CAPSULE ORAL ONCE
Status: COMPLETED | OUTPATIENT
Start: 2018-01-01 | End: 2018-01-01

## 2018-01-01 RX ORDER — ACETAMINOPHEN 500 MG
1000 TABLET ORAL EVERY 8 HOURS PRN
Qty: 50 TABLET | Refills: 0 | Status: ON HOLD | OUTPATIENT
Start: 2018-01-01 | End: 2019-01-01 | Stop reason: HOSPADM

## 2018-01-01 RX ORDER — TAMSULOSIN HYDROCHLORIDE 0.4 MG/1
0.4 CAPSULE ORAL EVERY MORNING
Status: DISCONTINUED | OUTPATIENT
Start: 2018-01-01 | End: 2019-01-01 | Stop reason: HOSPADM

## 2018-01-01 RX ORDER — PETROLATUM 420 MG/G
1 OINTMENT TOPICAL PRN
Status: DISCONTINUED | OUTPATIENT
Start: 2018-01-01 | End: 2018-01-01

## 2018-01-01 RX ORDER — SODIUM CHLORIDE 0.9 % (FLUSH) 0.9 %
10 SYRINGE (ML) INJECTION EVERY 12 HOURS SCHEDULED
Status: DISCONTINUED | OUTPATIENT
Start: 2018-01-01 | End: 2019-01-01 | Stop reason: HOSPADM

## 2018-01-01 RX ORDER — PREDNISONE 10 MG/1
10 TABLET ORAL DAILY
Status: DISCONTINUED | OUTPATIENT
Start: 2018-01-01 | End: 2018-01-01 | Stop reason: HOSPADM

## 2018-01-01 RX ORDER — 0.9 % SODIUM CHLORIDE 0.9 %
10 VIAL (ML) INJECTION DAILY
Status: DISCONTINUED | OUTPATIENT
Start: 2018-01-01 | End: 2018-01-01

## 2018-01-01 RX ORDER — METHYLPREDNISOLONE SODIUM SUCCINATE 125 MG/2ML
125 INJECTION, POWDER, LYOPHILIZED, FOR SOLUTION INTRAMUSCULAR; INTRAVENOUS ONCE
Status: COMPLETED | OUTPATIENT
Start: 2018-01-01 | End: 2018-01-01

## 2018-01-01 RX ORDER — WARFARIN SODIUM 4 MG/1
TABLET ORAL
Qty: 90 TABLET | Refills: 1 | Status: ON HOLD | OUTPATIENT
Start: 2018-01-01 | End: 2018-01-01 | Stop reason: HOSPADM

## 2018-01-01 RX ORDER — WARFARIN SODIUM 4 MG/1
TABLET ORAL
Qty: 30 TABLET | Refills: 3 | Status: SHIPPED | OUTPATIENT
Start: 2018-01-01 | End: 2018-01-01 | Stop reason: HOSPADM

## 2018-01-01 RX ORDER — PSEUDOEPHEDRINE HCL 120 MG/1
120 TABLET, FILM COATED, EXTENDED RELEASE ORAL EVERY 12 HOURS PRN
Qty: 6 TABLET | Refills: 0 | Status: ON HOLD | OUTPATIENT
Start: 2018-01-01 | End: 2019-01-01 | Stop reason: HOSPADM

## 2018-01-01 RX ORDER — SODIUM CHLORIDE 0.9 % (FLUSH) 0.9 %
10 SYRINGE (ML) INJECTION PRN
Status: DISCONTINUED | OUTPATIENT
Start: 2018-01-01 | End: 2018-01-01 | Stop reason: HOSPADM

## 2018-01-01 RX ORDER — PHYTONADIONE 5 MG/1
5 TABLET ORAL ONCE
Qty: 1 TABLET | Refills: 0 | Status: SHIPPED | OUTPATIENT
Start: 2018-01-01 | End: 2018-01-01

## 2018-01-01 RX ORDER — WARFARIN SODIUM 1 MG/1
TABLET ORAL
Qty: 30 TABLET | Refills: 3 | Status: SHIPPED | OUTPATIENT
Start: 2018-01-01 | End: 2018-01-01

## 2018-01-01 RX ORDER — WARFARIN SODIUM 5 MG/1
TABLET ORAL
Qty: 30 TABLET | Refills: 3 | Status: SHIPPED | OUTPATIENT
Start: 2018-01-01 | End: 2018-01-01

## 2018-01-01 RX ORDER — TOPIRAMATE 50 MG/1
50 TABLET, FILM COATED ORAL 2 TIMES DAILY
Qty: 180 TABLET | Refills: 1 | Status: SHIPPED | OUTPATIENT
Start: 2018-01-01 | End: 2018-01-01 | Stop reason: SDUPTHER

## 2018-01-01 RX ORDER — ATORVASTATIN CALCIUM 40 MG/1
80 TABLET, FILM COATED ORAL DAILY
Status: DISCONTINUED | OUTPATIENT
Start: 2018-01-01 | End: 2018-01-01 | Stop reason: HOSPADM

## 2018-01-01 RX ORDER — DOXYCYCLINE HYCLATE 100 MG
100 TABLET ORAL 2 TIMES DAILY WITH MEALS
Qty: 20 TABLET | Refills: 0 | Status: ON HOLD | OUTPATIENT
Start: 2018-01-01 | End: 2019-01-01 | Stop reason: HOSPADM

## 2018-01-01 RX ORDER — WARFARIN SODIUM 4 MG/1
8 TABLET ORAL EVERY EVENING
Status: DISCONTINUED | OUTPATIENT
Start: 2018-01-01 | End: 2018-01-01

## 2018-01-01 RX ORDER — ETOMIDATE 2 MG/ML
INJECTION INTRAVENOUS
Status: COMPLETED
Start: 2018-01-01 | End: 2018-01-01

## 2018-01-01 RX ORDER — METOPROLOL SUCCINATE 100 MG/1
100 TABLET, EXTENDED RELEASE ORAL DAILY
Status: DISCONTINUED | OUTPATIENT
Start: 2018-01-01 | End: 2018-01-01 | Stop reason: HOSPADM

## 2018-01-01 RX ORDER — WARFARIN SODIUM 5 MG/1
TABLET ORAL
Qty: 30 TABLET | Refills: 3 | Status: SHIPPED | OUTPATIENT
Start: 2018-01-01 | End: 2018-01-01 | Stop reason: SDUPTHER

## 2018-01-01 RX ORDER — KETOROLAC TROMETHAMINE 30 MG/ML
15 INJECTION, SOLUTION INTRAMUSCULAR; INTRAVENOUS ONCE
Status: COMPLETED | OUTPATIENT
Start: 2018-01-01 | End: 2018-01-01

## 2018-01-01 RX ORDER — FERROUS SULFATE 325(65) MG
325 TABLET ORAL
COMMUNITY
End: 2018-01-01

## 2018-01-01 RX ORDER — ATORVASTATIN CALCIUM 40 MG/1
80 TABLET, FILM COATED ORAL DAILY
Status: DISCONTINUED | OUTPATIENT
Start: 2018-01-01 | End: 2019-01-01 | Stop reason: HOSPADM

## 2018-01-01 RX ORDER — IPRATROPIUM BROMIDE AND ALBUTEROL SULFATE 2.5; .5 MG/3ML; MG/3ML
1 SOLUTION RESPIRATORY (INHALATION) ONCE
Status: COMPLETED | OUTPATIENT
Start: 2018-01-01 | End: 2018-01-01

## 2018-01-01 RX ORDER — PREDNISONE 20 MG/1
40 TABLET ORAL
Status: DISCONTINUED | OUTPATIENT
Start: 2018-01-01 | End: 2018-01-01 | Stop reason: HOSPADM

## 2018-01-01 RX ORDER — ONDANSETRON 2 MG/ML
4 INJECTION INTRAMUSCULAR; INTRAVENOUS EVERY 6 HOURS PRN
Status: DISCONTINUED | OUTPATIENT
Start: 2018-01-01 | End: 2019-01-01 | Stop reason: HOSPADM

## 2018-01-01 RX ORDER — MEMANTINE HYDROCHLORIDE 10 MG/1
10 TABLET ORAL 2 TIMES DAILY
Qty: 180 TABLET | Refills: 2 | Status: ON HOLD | OUTPATIENT
Start: 2018-01-01 | End: 2019-01-01 | Stop reason: HOSPADM

## 2018-01-01 RX ORDER — PANTOPRAZOLE SODIUM 40 MG/1
40 TABLET, DELAYED RELEASE ORAL
Status: DISCONTINUED | OUTPATIENT
Start: 2018-01-01 | End: 2018-01-01 | Stop reason: HOSPADM

## 2018-01-01 RX ORDER — NITROGLYCERIN 0.4 MG/1
0.4 TABLET SUBLINGUAL EVERY 5 MIN PRN
Status: DISCONTINUED | OUTPATIENT
Start: 2018-01-01 | End: 2019-01-01 | Stop reason: HOSPADM

## 2018-01-01 RX ORDER — ATORVASTATIN CALCIUM 80 MG/1
TABLET, FILM COATED ORAL
Qty: 90 TABLET | Refills: 3 | Status: ON HOLD | OUTPATIENT
Start: 2018-01-01 | End: 2019-01-01 | Stop reason: HOSPADM

## 2018-01-01 RX ORDER — ASPIRIN 81 MG/1
81 TABLET, CHEWABLE ORAL EVERY MORNING
Status: DISCONTINUED | OUTPATIENT
Start: 2018-01-01 | End: 2019-01-01 | Stop reason: HOSPADM

## 2018-01-01 RX ORDER — TIOTROPIUM BROMIDE 18 UG/1
CAPSULE ORAL; RESPIRATORY (INHALATION)
Qty: 90 CAPSULE | Refills: 3 | Status: SHIPPED | OUTPATIENT
Start: 2018-01-01 | End: 2018-01-01

## 2018-01-01 RX ORDER — WARFARIN SODIUM 4 MG/1
TABLET ORAL
Qty: 90 TABLET | Refills: 3 | Status: ON HOLD | OUTPATIENT
Start: 2018-01-01 | End: 2018-01-01 | Stop reason: DRUGHIGH

## 2018-01-01 RX ORDER — DIAPER,BRIEF,INFANT-TODD,DISP
EACH MISCELLANEOUS
Qty: 1 TUBE | Refills: 0 | Status: SHIPPED | OUTPATIENT
Start: 2018-01-01 | End: 2018-01-01

## 2018-01-01 RX ORDER — TOPIRAMATE 50 MG/1
50 TABLET, FILM COATED ORAL NIGHTLY
Status: CANCELLED | OUTPATIENT
Start: 2018-01-01

## 2018-01-01 RX ORDER — PREDNISONE 10 MG/1
TABLET ORAL
Qty: 37 TABLET | Refills: 0 | Status: SHIPPED | OUTPATIENT
Start: 2018-01-01 | End: 2018-01-01

## 2018-01-01 RX ORDER — TOPIRAMATE 25 MG/1
50 TABLET ORAL 2 TIMES DAILY
Status: DISCONTINUED | OUTPATIENT
Start: 2018-01-01 | End: 2019-01-01 | Stop reason: HOSPADM

## 2018-01-01 RX ORDER — IPRATROPIUM BROMIDE AND ALBUTEROL SULFATE 2.5; .5 MG/3ML; MG/3ML
1 SOLUTION RESPIRATORY (INHALATION)
Status: DISCONTINUED | OUTPATIENT
Start: 2018-01-01 | End: 2018-01-01 | Stop reason: HOSPADM

## 2018-01-01 RX ORDER — WARFARIN SODIUM 4 MG/1
8 TABLET ORAL EVERY EVENING
Status: ON HOLD | COMMUNITY
End: 2019-01-01 | Stop reason: HOSPADM

## 2018-01-01 RX ORDER — MORPHINE SULFATE 4 MG/ML
4 INJECTION, SOLUTION INTRAMUSCULAR; INTRAVENOUS ONCE
Status: COMPLETED | OUTPATIENT
Start: 2018-01-01 | End: 2018-01-01

## 2018-01-01 RX ORDER — TOPIRAMATE 25 MG/1
50 TABLET ORAL 2 TIMES DAILY
Status: DISCONTINUED | OUTPATIENT
Start: 2018-01-01 | End: 2018-01-01 | Stop reason: HOSPADM

## 2018-01-01 RX ORDER — SODIUM CHLORIDE 9 MG/ML
INJECTION, SOLUTION INTRAVENOUS CONTINUOUS
Status: DISCONTINUED | OUTPATIENT
Start: 2018-01-01 | End: 2019-01-01 | Stop reason: HOSPADM

## 2018-01-01 RX ORDER — CEFDINIR 300 MG/1
300 CAPSULE ORAL 2 TIMES DAILY
Qty: 20 CAPSULE | Refills: 0 | Status: SHIPPED | OUTPATIENT
Start: 2018-01-01 | End: 2018-01-01

## 2018-01-01 RX ORDER — PREDNISONE 20 MG/1
40 TABLET ORAL DAILY
Status: DISCONTINUED | OUTPATIENT
Start: 2018-01-01 | End: 2018-01-01 | Stop reason: HOSPADM

## 2018-01-01 RX ORDER — IPRATROPIUM BROMIDE AND ALBUTEROL SULFATE 2.5; .5 MG/3ML; MG/3ML
1 SOLUTION RESPIRATORY (INHALATION) 4 TIMES DAILY
Status: DISCONTINUED | OUTPATIENT
Start: 2018-01-01 | End: 2018-01-01 | Stop reason: HOSPADM

## 2018-01-01 RX ORDER — SODIUM CHLORIDE 9 MG/ML
INJECTION, SOLUTION INTRAVENOUS CONTINUOUS
Status: DISCONTINUED | OUTPATIENT
Start: 2018-01-01 | End: 2018-01-01 | Stop reason: HOSPADM

## 2018-01-01 RX ORDER — MEMANTINE HYDROCHLORIDE 10 MG/1
10 TABLET ORAL 2 TIMES DAILY
Status: DISCONTINUED | OUTPATIENT
Start: 2018-01-01 | End: 2019-01-01 | Stop reason: HOSPADM

## 2018-01-01 RX ORDER — AZITHROMYCIN 250 MG/1
250 TABLET, FILM COATED ORAL DAILY
Status: DISCONTINUED | OUTPATIENT
Start: 2018-01-01 | End: 2018-01-01 | Stop reason: HOSPADM

## 2018-01-01 RX ORDER — WARFARIN SODIUM 5 MG/1
5 TABLET ORAL
Status: COMPLETED | OUTPATIENT
Start: 2018-01-01 | End: 2018-01-01

## 2018-01-01 RX ORDER — PREDNISONE 20 MG/1
20 TABLET ORAL DAILY
Qty: 10 TABLET | Refills: 0 | Status: SHIPPED | OUTPATIENT
Start: 2018-01-01 | End: 2018-01-01

## 2018-01-01 RX ORDER — PREDNISONE 20 MG/1
40 TABLET ORAL DAILY
Qty: 20 TABLET | Refills: 0 | Status: SHIPPED | OUTPATIENT
Start: 2018-01-01 | End: 2018-01-01

## 2018-01-01 RX ORDER — MAGNESIUM SULFATE IN WATER 40 MG/ML
2 INJECTION, SOLUTION INTRAVENOUS ONCE
Status: COMPLETED | OUTPATIENT
Start: 2018-01-01 | End: 2018-01-01

## 2018-01-01 RX ORDER — FORMOTEROL FUMARATE 20 UG/2ML
20 SOLUTION RESPIRATORY (INHALATION) EVERY 12 HOURS
Status: DISCONTINUED | OUTPATIENT
Start: 2018-01-01 | End: 2018-01-01 | Stop reason: HOSPADM

## 2018-01-01 RX ORDER — IPRATROPIUM BROMIDE AND ALBUTEROL SULFATE 2.5; .5 MG/3ML; MG/3ML
1 SOLUTION RESPIRATORY (INHALATION) 4 TIMES DAILY
Status: DISCONTINUED | OUTPATIENT
Start: 2018-01-01 | End: 2019-01-01 | Stop reason: HOSPADM

## 2018-01-01 RX ORDER — ACETAMINOPHEN 325 MG/1
650 TABLET ORAL EVERY 8 HOURS PRN
Status: DISCONTINUED | OUTPATIENT
Start: 2018-01-01 | End: 2018-01-01 | Stop reason: HOSPADM

## 2018-01-01 RX ORDER — METHYLPREDNISOLONE SODIUM SUCCINATE 40 MG/ML
20 INJECTION, POWDER, LYOPHILIZED, FOR SOLUTION INTRAMUSCULAR; INTRAVENOUS EVERY 12 HOURS
Status: DISCONTINUED | OUTPATIENT
Start: 2018-01-01 | End: 2018-01-01

## 2018-01-01 RX ORDER — FUROSEMIDE 40 MG/1
TABLET ORAL
Qty: 90 TABLET | Refills: 3 | Status: ON HOLD | OUTPATIENT
Start: 2018-01-01 | End: 2019-01-01 | Stop reason: HOSPADM

## 2018-01-01 RX ORDER — PRAMIPEXOLE DIHYDROCHLORIDE 1.5 MG/1
1.5 TABLET ORAL 3 TIMES DAILY
Qty: 90 TABLET | Refills: 2 | Status: SHIPPED | OUTPATIENT
Start: 2018-01-01 | End: 2018-01-01 | Stop reason: SDUPTHER

## 2018-01-01 RX ORDER — WARFARIN SODIUM 4 MG/1
4 TABLET ORAL DAILY
Status: DISCONTINUED | OUTPATIENT
Start: 2018-01-01 | End: 2018-01-01 | Stop reason: CLARIF

## 2018-01-01 RX ORDER — FERROUS SULFATE 325(65) MG
325 TABLET ORAL
Status: DISCONTINUED | OUTPATIENT
Start: 2018-01-01 | End: 2018-01-01 | Stop reason: HOSPADM

## 2018-01-01 RX ORDER — BUDESONIDE 0.5 MG/2ML
INHALANT ORAL
Qty: 360 ML | Refills: 3 | Status: ON HOLD | OUTPATIENT
Start: 2018-01-01 | End: 2019-01-01 | Stop reason: HOSPADM

## 2018-01-01 RX ORDER — PRIMIDONE 50 MG/1
100 TABLET ORAL 2 TIMES DAILY
Status: CANCELLED | OUTPATIENT
Start: 2018-01-01

## 2018-01-01 RX ORDER — OMEPRAZOLE 40 MG/1
CAPSULE, DELAYED RELEASE ORAL
Qty: 90 CAPSULE | Refills: 1 | Status: ON HOLD | OUTPATIENT
Start: 2018-01-01 | End: 2019-01-01 | Stop reason: HOSPADM

## 2018-01-01 RX ORDER — WARFARIN SODIUM 1 MG/1
7 TABLET ORAL
Status: ON HOLD | COMMUNITY
End: 2018-01-01 | Stop reason: HOSPADM

## 2018-01-01 RX ORDER — ACETAMINOPHEN 325 MG/1
650 TABLET ORAL EVERY 8 HOURS PRN
Status: DISCONTINUED | OUTPATIENT
Start: 2018-01-01 | End: 2019-01-01 | Stop reason: HOSPADM

## 2018-01-01 RX ORDER — CEFUROXIME AXETIL 500 MG/1
500 TABLET ORAL EVERY 12 HOURS SCHEDULED
Status: DISCONTINUED | OUTPATIENT
Start: 2018-01-01 | End: 2018-01-01 | Stop reason: HOSPADM

## 2018-01-01 RX ORDER — PROPOFOL 10 MG/ML
10 INJECTION, EMULSION INTRAVENOUS
Status: DISCONTINUED | OUTPATIENT
Start: 2018-01-01 | End: 2018-01-01

## 2018-01-01 RX ORDER — ONDANSETRON 2 MG/ML
4 INJECTION INTRAMUSCULAR; INTRAVENOUS EVERY 6 HOURS PRN
Status: DISCONTINUED | OUTPATIENT
Start: 2018-01-01 | End: 2018-01-01 | Stop reason: HOSPADM

## 2018-01-01 RX ORDER — PETROLATUM 420 MG/G
OINTMENT TOPICAL DAILY
Status: DISCONTINUED | OUTPATIENT
Start: 2018-01-01 | End: 2018-01-01 | Stop reason: HOSPADM

## 2018-01-01 RX ORDER — PREDNISONE 10 MG/1
TABLET ORAL
Qty: 30 TABLET | Refills: 0 | Status: ON HOLD | OUTPATIENT
Start: 2018-01-01 | End: 2019-01-01 | Stop reason: HOSPADM

## 2018-01-01 RX ORDER — IPRATROPIUM BROMIDE AND ALBUTEROL SULFATE 2.5; .5 MG/3ML; MG/3ML
1 SOLUTION RESPIRATORY (INHALATION)
Status: COMPLETED | OUTPATIENT
Start: 2018-01-01 | End: 2018-01-01

## 2018-01-01 RX ORDER — PRIMIDONE 50 MG/1
100 TABLET ORAL 2 TIMES DAILY
Status: ON HOLD | COMMUNITY
End: 2018-01-01 | Stop reason: HOSPADM

## 2018-01-01 RX ORDER — FORMOTEROL FUMARATE 20 UG/2ML
20 SOLUTION RESPIRATORY (INHALATION) 2 TIMES DAILY
Status: DISCONTINUED | OUTPATIENT
Start: 2018-01-01 | End: 2019-01-01 | Stop reason: HOSPADM

## 2018-01-01 RX ORDER — METOPROLOL SUCCINATE 100 MG/1
TABLET, EXTENDED RELEASE ORAL
Qty: 180 TABLET | Refills: 3 | Status: ON HOLD | OUTPATIENT
Start: 2018-01-01 | End: 2019-01-01

## 2018-01-01 RX ORDER — WARFARIN SODIUM 4 MG/1
8 TABLET ORAL
Status: DISCONTINUED | OUTPATIENT
Start: 2018-01-01 | End: 2018-01-01

## 2018-01-01 RX ORDER — LOPERAMIDE HYDROCHLORIDE 2 MG/1
2 TABLET ORAL 2 TIMES DAILY PRN
Qty: 6 TABLET | Refills: 0 | Status: ON HOLD | OUTPATIENT
Start: 2018-01-01 | End: 2019-01-01

## 2018-01-01 RX ORDER — PANTOPRAZOLE SODIUM 40 MG/1
40 TABLET, DELAYED RELEASE ORAL
Status: DISCONTINUED | OUTPATIENT
Start: 2018-01-01 | End: 2019-01-01 | Stop reason: HOSPADM

## 2018-01-01 RX ORDER — PRIMIDONE 50 MG/1
100 TABLET ORAL 2 TIMES DAILY
Qty: 90 TABLET | Refills: 3 | Status: SHIPPED | OUTPATIENT
Start: 2018-01-01 | End: 2018-01-01 | Stop reason: ALTCHOICE

## 2018-01-01 RX ORDER — AZITHROMYCIN 250 MG/1
250 TABLET, FILM COATED ORAL DAILY
Qty: 3 TABLET | Refills: 0 | Status: SHIPPED | OUTPATIENT
Start: 2018-01-01 | End: 2018-01-01

## 2018-01-01 RX ORDER — 0.9 % SODIUM CHLORIDE 0.9 %
1000 INTRAVENOUS SOLUTION INTRAVENOUS ONCE
Status: COMPLETED | OUTPATIENT
Start: 2018-01-01 | End: 2018-01-01

## 2018-01-01 RX ORDER — METOPROLOL SUCCINATE 100 MG/1
100 TABLET, EXTENDED RELEASE ORAL 2 TIMES DAILY
Status: DISCONTINUED | OUTPATIENT
Start: 2018-01-01 | End: 2018-01-01

## 2018-01-01 RX ORDER — NALOXONE HYDROCHLORIDE 0.4 MG/ML
0.4 INJECTION, SOLUTION INTRAMUSCULAR; INTRAVENOUS; SUBCUTANEOUS ONCE
Status: COMPLETED | OUTPATIENT
Start: 2018-01-01 | End: 2018-01-01

## 2018-01-01 RX ORDER — WARFARIN SODIUM 1 MG/1
1 TABLET ORAL
Status: COMPLETED | OUTPATIENT
Start: 2018-01-01 | End: 2018-01-01

## 2018-01-01 RX ORDER — PREDNISONE 20 MG/1
20 TABLET ORAL DAILY
Status: DISCONTINUED | OUTPATIENT
Start: 2018-01-01 | End: 2018-01-01 | Stop reason: HOSPADM

## 2018-01-01 RX ORDER — BUDESONIDE 0.5 MG/2ML
1 INHALANT ORAL 2 TIMES DAILY
Status: DISCONTINUED | OUTPATIENT
Start: 2018-01-01 | End: 2018-01-01 | Stop reason: HOSPADM

## 2018-01-01 RX ORDER — BUDESONIDE 0.5 MG/2ML
0.5 INHALANT ORAL 2 TIMES DAILY
Status: DISCONTINUED | OUTPATIENT
Start: 2018-01-01 | End: 2019-01-01 | Stop reason: HOSPADM

## 2018-01-01 RX ORDER — GUAIFENESIN 400 MG/1
400 TABLET ORAL 3 TIMES DAILY
Status: DISCONTINUED | OUTPATIENT
Start: 2018-01-01 | End: 2018-01-01 | Stop reason: HOSPADM

## 2018-01-01 RX ORDER — LORAZEPAM 2 MG/ML
1 INJECTION INTRAMUSCULAR ONCE
Status: DISCONTINUED | OUTPATIENT
Start: 2018-01-01 | End: 2018-01-01 | Stop reason: HOSPADM

## 2018-01-01 RX ORDER — TOPIRAMATE 50 MG/1
50 TABLET, FILM COATED ORAL 2 TIMES DAILY
Qty: 180 TABLET | Refills: 1 | Status: ON HOLD | OUTPATIENT
Start: 2018-01-01 | End: 2019-01-01 | Stop reason: HOSPADM

## 2018-01-01 RX ORDER — METOPROLOL SUCCINATE 100 MG/1
100 TABLET, EXTENDED RELEASE ORAL DAILY
Status: DISCONTINUED | OUTPATIENT
Start: 2018-01-01 | End: 2019-01-01 | Stop reason: HOSPADM

## 2018-01-01 RX ORDER — WARFARIN SODIUM 5 MG/1
5 TABLET ORAL DAILY
Status: DISCONTINUED | OUTPATIENT
Start: 2018-01-01 | End: 2018-01-01 | Stop reason: CLARIF

## 2018-01-01 RX ORDER — IPRATROPIUM BROMIDE AND ALBUTEROL SULFATE 2.5; .5 MG/3ML; MG/3ML
3 SOLUTION RESPIRATORY (INHALATION) ONCE
Status: COMPLETED | OUTPATIENT
Start: 2018-01-01 | End: 2018-01-01

## 2018-01-01 RX ORDER — GUAIFENESIN 600 MG/1
1200 TABLET, EXTENDED RELEASE ORAL 2 TIMES DAILY
Qty: 20 TABLET | Refills: 0 | Status: SHIPPED | OUTPATIENT
Start: 2018-01-01 | End: 2018-01-01

## 2018-01-01 RX ORDER — PSEUDOEPHEDRINE HCL 120 MG/1
120 TABLET, FILM COATED, EXTENDED RELEASE ORAL EVERY 12 HOURS PRN
Status: DISCONTINUED | OUTPATIENT
Start: 2018-01-01 | End: 2019-01-01 | Stop reason: HOSPADM

## 2018-01-01 RX ORDER — FUROSEMIDE 40 MG/1
40 TABLET ORAL DAILY
Status: DISCONTINUED | OUTPATIENT
Start: 2018-01-01 | End: 2018-01-01 | Stop reason: HOSPADM

## 2018-01-01 RX ORDER — CEFTRIAXONE 2 G/1
2 INJECTION, POWDER, FOR SOLUTION INTRAMUSCULAR; INTRAVENOUS EVERY 24 HOURS
Status: DISCONTINUED | OUTPATIENT
Start: 2018-01-01 | End: 2018-01-01 | Stop reason: SDUPTHER

## 2018-01-01 RX ORDER — TAMSULOSIN HYDROCHLORIDE 0.4 MG/1
0.4 CAPSULE ORAL DAILY
Qty: 90 CAPSULE | Refills: 2 | Status: ON HOLD | OUTPATIENT
Start: 2018-01-01 | End: 2019-01-01 | Stop reason: HOSPADM

## 2018-01-01 RX ORDER — WARFARIN SODIUM 5 MG/1
5 TABLET ORAL DAILY
Status: DISCONTINUED | OUTPATIENT
Start: 2018-01-01 | End: 2018-01-01 | Stop reason: HOSPADM

## 2018-01-01 RX ORDER — DOXYCYCLINE HYCLATE 100 MG
100 TABLET ORAL 2 TIMES DAILY
Qty: 6 TABLET | Refills: 0 | Status: SHIPPED | OUTPATIENT
Start: 2018-01-01 | End: 2018-01-01

## 2018-01-01 RX ORDER — METHYLPREDNISOLONE SODIUM SUCCINATE 125 MG/2ML
60 INJECTION, POWDER, LYOPHILIZED, FOR SOLUTION INTRAMUSCULAR; INTRAVENOUS DAILY
Status: DISCONTINUED | OUTPATIENT
Start: 2018-01-01 | End: 2019-01-01

## 2018-01-01 RX ORDER — VECURONIUM BROMIDE 1 MG/ML
10 INJECTION, POWDER, LYOPHILIZED, FOR SOLUTION INTRAVENOUS ONCE
Status: COMPLETED | OUTPATIENT
Start: 2018-01-01 | End: 2018-01-01

## 2018-01-01 RX ORDER — PRIMIDONE 50 MG/1
100 TABLET ORAL 2 TIMES DAILY
Status: DISCONTINUED | OUTPATIENT
Start: 2018-01-01 | End: 2018-01-01 | Stop reason: HOSPADM

## 2018-01-01 RX ORDER — WARFARIN SODIUM 4 MG/1
8 TABLET ORAL
Status: DISCONTINUED | OUTPATIENT
Start: 2018-01-01 | End: 2018-01-01 | Stop reason: HOSPADM

## 2018-01-01 RX ORDER — WARFARIN SODIUM 4 MG/1
4 TABLET ORAL DAILY
Status: DISCONTINUED | OUTPATIENT
Start: 2018-01-01 | End: 2018-01-01

## 2018-01-01 RX ORDER — 0.9 % SODIUM CHLORIDE 0.9 %
500 INTRAVENOUS SOLUTION INTRAVENOUS ONCE
Status: COMPLETED | OUTPATIENT
Start: 2018-01-01 | End: 2018-01-01

## 2018-01-01 RX ORDER — METHYLPREDNISOLONE SODIUM SUCCINATE 125 MG/2ML
80 INJECTION, POWDER, LYOPHILIZED, FOR SOLUTION INTRAMUSCULAR; INTRAVENOUS DAILY
Status: DISCONTINUED | OUTPATIENT
Start: 2018-01-01 | End: 2018-01-01 | Stop reason: HOSPADM

## 2018-01-01 RX ADMIN — SODIUM CHLORIDE: 9 INJECTION, SOLUTION INTRAVENOUS at 02:07

## 2018-01-01 RX ADMIN — METHYLPREDNISOLONE SODIUM SUCCINATE 40 MG: 40 INJECTION, POWDER, LYOPHILIZED, FOR SOLUTION INTRAMUSCULAR; INTRAVENOUS at 02:28

## 2018-01-01 RX ADMIN — Medication 10 ML: at 21:06

## 2018-01-01 RX ADMIN — METHYLPREDNISOLONE SODIUM SUCCINATE 80 MG: 125 INJECTION, POWDER, LYOPHILIZED, FOR SOLUTION INTRAMUSCULAR; INTRAVENOUS at 21:06

## 2018-01-01 RX ADMIN — VANCOMYCIN HYDROCHLORIDE 2000 MG: 10 INJECTION, POWDER, LYOPHILIZED, FOR SOLUTION INTRAVENOUS at 13:39

## 2018-01-01 RX ADMIN — IPRATROPIUM BROMIDE AND ALBUTEROL SULFATE 1 AMPULE: 2.5; .5 SOLUTION RESPIRATORY (INHALATION) at 09:08

## 2018-01-01 RX ADMIN — IPRATROPIUM BROMIDE AND ALBUTEROL SULFATE 1 AMPULE: .5; 3 SOLUTION RESPIRATORY (INHALATION) at 06:20

## 2018-01-01 RX ADMIN — METHYLPREDNISOLONE SODIUM SUCCINATE 40 MG: 40 INJECTION, POWDER, LYOPHILIZED, FOR SOLUTION INTRAMUSCULAR; INTRAVENOUS at 13:52

## 2018-01-01 RX ADMIN — ACETAMINOPHEN 650 MG: 325 TABLET ORAL at 04:01

## 2018-01-01 RX ADMIN — POTASSIUM & SODIUM PHOSPHATES POWDER PACK 280-160-250 MG 250 MG: 280-160-250 PACK at 10:13

## 2018-01-01 RX ADMIN — PRAMIPEXOLE DIHYDROCHLORIDE 1.5 MG: 1 TABLET ORAL at 16:36

## 2018-01-01 RX ADMIN — BUDESONIDE 1000 MCG: 0.5 SUSPENSION RESPIRATORY (INHALATION) at 08:34

## 2018-01-01 RX ADMIN — METOPROLOL SUCCINATE 100 MG: 100 TABLET, FILM COATED, EXTENDED RELEASE ORAL at 09:38

## 2018-01-01 RX ADMIN — FERROUS SULFATE TAB 325 MG (65 MG ELEMENTAL FE) 325 MG: 325 (65 FE) TAB at 08:30

## 2018-01-01 RX ADMIN — PRAMIPEXOLE DIHYDROCHLORIDE 1.5 MG: 1 TABLET ORAL at 08:12

## 2018-01-01 RX ADMIN — METHYLPREDNISOLONE SODIUM SUCCINATE 40 MG: 40 INJECTION, POWDER, LYOPHILIZED, FOR SOLUTION INTRAMUSCULAR; INTRAVENOUS at 17:30

## 2018-01-01 RX ADMIN — PRAMIPEXOLE DIHYDROCHLORIDE 1.5 MG: 1 TABLET ORAL at 21:46

## 2018-01-01 RX ADMIN — TOPIRAMATE 50 MG: 25 TABLET, FILM COATED ORAL at 22:11

## 2018-01-01 RX ADMIN — WARFARIN SODIUM 1 MG: 1 TABLET ORAL at 21:54

## 2018-01-01 RX ADMIN — PRAMIPEXOLE DIHYDROCHLORIDE 1.5 MG: 1 TABLET ORAL at 20:34

## 2018-01-01 RX ADMIN — TOPIRAMATE 50 MG: 25 TABLET, FILM COATED ORAL at 19:48

## 2018-01-01 RX ADMIN — PRAMIPEXOLE DIHYDROCHLORIDE 1.5 MG: 1 TABLET ORAL at 21:43

## 2018-01-01 RX ADMIN — Medication 10 ML: at 09:43

## 2018-01-01 RX ADMIN — MEMANTINE HYDROCHLORIDE 10 MG: 10 TABLET, FILM COATED ORAL at 08:08

## 2018-01-01 RX ADMIN — ASPIRIN 81 MG 81 MG: 81 TABLET ORAL at 08:57

## 2018-01-01 RX ADMIN — IPRATROPIUM BROMIDE AND ALBUTEROL SULFATE 1 AMPULE: .5; 3 SOLUTION RESPIRATORY (INHALATION) at 16:32

## 2018-01-01 RX ADMIN — FORMOTEROL FUMARATE DIHYDRATE 20 MCG: 20 SOLUTION RESPIRATORY (INHALATION) at 07:55

## 2018-01-01 RX ADMIN — TOPIRAMATE 50 MG: 25 TABLET, FILM COATED ORAL at 21:07

## 2018-01-01 RX ADMIN — ASPIRIN 81 MG 81 MG: 81 TABLET ORAL at 08:09

## 2018-01-01 RX ADMIN — METOPROLOL SUCCINATE 100 MG: 100 TABLET, FILM COATED, EXTENDED RELEASE ORAL at 09:42

## 2018-01-01 RX ADMIN — METOPROLOL SUCCINATE 100 MG: 100 TABLET, EXTENDED RELEASE ORAL at 21:07

## 2018-01-01 RX ADMIN — FORMOTEROL FUMARATE DIHYDRATE 20 MCG: 20 SOLUTION RESPIRATORY (INHALATION) at 09:08

## 2018-01-01 RX ADMIN — METHYLPREDNISOLONE SODIUM SUCCINATE 125 MG: 125 INJECTION, POWDER, FOR SOLUTION INTRAMUSCULAR; INTRAVENOUS at 21:16

## 2018-01-01 RX ADMIN — PRIMIDONE 100 MG: 50 TABLET ORAL at 08:54

## 2018-01-01 RX ADMIN — FORMOTEROL FUMARATE DIHYDRATE 20 MCG: 20 SOLUTION RESPIRATORY (INHALATION) at 22:30

## 2018-01-01 RX ADMIN — DOXYCYCLINE 100 MG: 100 INJECTION, POWDER, LYOPHILIZED, FOR SOLUTION INTRAVENOUS at 17:56

## 2018-01-01 RX ADMIN — IPRATROPIUM BROMIDE AND ALBUTEROL SULFATE 1 AMPULE: 2.5; .5 SOLUTION RESPIRATORY (INHALATION) at 12:51

## 2018-01-01 RX ADMIN — PRAMIPEXOLE DIHYDROCHLORIDE 1.5 MG: 1 TABLET ORAL at 14:44

## 2018-01-01 RX ADMIN — MORPHINE SULFATE 4 MG: 4 INJECTION, SOLUTION INTRAMUSCULAR; INTRAVENOUS at 19:50

## 2018-01-01 RX ADMIN — METOPROLOL SUCCINATE 100 MG: 100 TABLET, FILM COATED, EXTENDED RELEASE ORAL at 08:51

## 2018-01-01 RX ADMIN — VANCOMYCIN HYDROCHLORIDE 1.5 G: 10 INJECTION, POWDER, LYOPHILIZED, FOR SOLUTION INTRAVENOUS at 06:33

## 2018-01-01 RX ADMIN — BUDESONIDE 1000 MCG: 0.5 SUSPENSION RESPIRATORY (INHALATION) at 20:07

## 2018-01-01 RX ADMIN — PANTOPRAZOLE SODIUM 40 MG: 40 TABLET, DELAYED RELEASE ORAL at 05:23

## 2018-01-01 RX ADMIN — BUDESONIDE 1000 MCG: 0.5 SUSPENSION RESPIRATORY (INHALATION) at 09:08

## 2018-01-01 RX ADMIN — Medication 10 ML: at 08:14

## 2018-01-01 RX ADMIN — BUDESONIDE 1000 MCG: 0.5 SUSPENSION RESPIRATORY (INHALATION) at 08:26

## 2018-01-01 RX ADMIN — AZITHROMYCIN 500 MG: 250 TABLET, FILM COATED ORAL at 10:31

## 2018-01-01 RX ADMIN — FORMOTEROL FUMARATE DIHYDRATE 20 MCG: 20 SOLUTION RESPIRATORY (INHALATION) at 08:27

## 2018-01-01 RX ADMIN — IPRATROPIUM BROMIDE AND ALBUTEROL SULFATE 1 AMPULE: .5; 3 SOLUTION RESPIRATORY (INHALATION) at 01:57

## 2018-01-01 RX ADMIN — ONDANSETRON 4 MG: 2 INJECTION INTRAMUSCULAR; INTRAVENOUS at 19:50

## 2018-01-01 RX ADMIN — PANTOPRAZOLE SODIUM 40 MG: 40 INJECTION, POWDER, FOR SOLUTION INTRAVENOUS at 09:39

## 2018-01-01 RX ADMIN — SODIUM CHLORIDE: 9 INJECTION, SOLUTION INTRAVENOUS at 05:32

## 2018-01-01 RX ADMIN — PRIMIDONE 100 MG: 50 TABLET ORAL at 21:05

## 2018-01-01 RX ADMIN — METOPROLOL SUCCINATE 100 MG: 100 TABLET, FILM COATED, EXTENDED RELEASE ORAL at 08:30

## 2018-01-01 RX ADMIN — IPRATROPIUM BROMIDE AND ALBUTEROL SULFATE 1 AMPULE: 2.5; .5 SOLUTION RESPIRATORY (INHALATION) at 16:16

## 2018-01-01 RX ADMIN — PRAMIPEXOLE DIHYDROCHLORIDE 1.5 MG: 1 TABLET ORAL at 16:22

## 2018-01-01 RX ADMIN — METHYLPREDNISOLONE SODIUM SUCCINATE 40 MG: 40 INJECTION, POWDER, LYOPHILIZED, FOR SOLUTION INTRAMUSCULAR; INTRAVENOUS at 10:12

## 2018-01-01 RX ADMIN — PRAMIPEXOLE DIHYDROCHLORIDE 1.5 MG: 1 TABLET ORAL at 08:29

## 2018-01-01 RX ADMIN — PRAMIPEXOLE DIHYDROCHLORIDE 1.5 MG: 1 TABLET ORAL at 08:56

## 2018-01-01 RX ADMIN — MEMANTINE HYDROCHLORIDE 10 MG: 10 TABLET, FILM COATED ORAL at 22:11

## 2018-01-01 RX ADMIN — PRIMIDONE 100 MG: 50 TABLET ORAL at 08:51

## 2018-01-01 RX ADMIN — TOPIRAMATE 50 MG: 25 TABLET, FILM COATED ORAL at 20:11

## 2018-01-01 RX ADMIN — Medication 10 ML: at 21:55

## 2018-01-01 RX ADMIN — METOPROLOL SUCCINATE 100 MG: 100 TABLET, FILM COATED, EXTENDED RELEASE ORAL at 21:42

## 2018-01-01 RX ADMIN — MEMANTINE HYDROCHLORIDE 10 MG: 10 TABLET, FILM COATED ORAL at 21:05

## 2018-01-01 RX ADMIN — AZITHROMYCIN 250 MG: 250 TABLET, FILM COATED ORAL at 08:18

## 2018-01-01 RX ADMIN — MEMANTINE HYDROCHLORIDE 10 MG: 10 TABLET, FILM COATED ORAL at 09:42

## 2018-01-01 RX ADMIN — PRIMIDONE 100 MG: 50 TABLET ORAL at 09:42

## 2018-01-01 RX ADMIN — METHYLPREDNISOLONE SODIUM SUCCINATE 40 MG: 40 INJECTION, POWDER, LYOPHILIZED, FOR SOLUTION INTRAMUSCULAR; INTRAVENOUS at 03:42

## 2018-01-01 RX ADMIN — ASPIRIN 81 MG 81 MG: 81 TABLET ORAL at 19:11

## 2018-01-01 RX ADMIN — FORMOTEROL FUMARATE DIHYDRATE 20 MCG: 20 SOLUTION RESPIRATORY (INHALATION) at 08:26

## 2018-01-01 RX ADMIN — FORMOTEROL FUMARATE DIHYDRATE 20 MCG: 20 SOLUTION RESPIRATORY (INHALATION) at 20:07

## 2018-01-01 RX ADMIN — CEFAZOLIN SODIUM 2 G: 1 INJECTION, POWDER, FOR SOLUTION INTRAMUSCULAR; INTRAVENOUS at 16:53

## 2018-01-01 RX ADMIN — MEMANTINE HYDROCHLORIDE 10 MG: 10 TABLET, FILM COATED ORAL at 09:06

## 2018-01-01 RX ADMIN — ACETAMINOPHEN 650 MG: 325 TABLET ORAL at 19:02

## 2018-01-01 RX ADMIN — METOPROLOL SUCCINATE 100 MG: 100 TABLET, EXTENDED RELEASE ORAL at 09:55

## 2018-01-01 RX ADMIN — METOPROLOL SUCCINATE 100 MG: 100 TABLET, FILM COATED, EXTENDED RELEASE ORAL at 21:05

## 2018-01-01 RX ADMIN — PRAMIPEXOLE DIHYDROCHLORIDE 1.5 MG: 1 TABLET ORAL at 09:42

## 2018-01-01 RX ADMIN — BUDESONIDE 1000 MCG: 0.5 SUSPENSION RESPIRATORY (INHALATION) at 20:11

## 2018-01-01 RX ADMIN — GUAIFENESIN 400 MG: 400 TABLET ORAL at 13:21

## 2018-01-01 RX ADMIN — IPRATROPIUM BROMIDE AND ALBUTEROL SULFATE 1 AMPULE: 2.5; .5 SOLUTION RESPIRATORY (INHALATION) at 17:25

## 2018-01-01 RX ADMIN — METOPROLOL SUCCINATE 100 MG: 100 TABLET, EXTENDED RELEASE ORAL at 08:09

## 2018-01-01 RX ADMIN — MEMANTINE HYDROCHLORIDE 10 MG: 10 TABLET, FILM COATED ORAL at 09:55

## 2018-01-01 RX ADMIN — TOPIRAMATE 50 MG: 25 TABLET, FILM COATED ORAL at 22:10

## 2018-01-01 RX ADMIN — BUDESONIDE 1000 MCG: 0.5 SUSPENSION RESPIRATORY (INHALATION) at 11:19

## 2018-01-01 RX ADMIN — METRONIDAZOLE 500 MG: 500 INJECTION, SOLUTION INTRAVENOUS at 14:21

## 2018-01-01 RX ADMIN — IPRATROPIUM BROMIDE AND ALBUTEROL SULFATE 1 AMPULE: 2.5; .5 SOLUTION RESPIRATORY (INHALATION) at 08:26

## 2018-01-01 RX ADMIN — METHYLPREDNISOLONE SODIUM SUCCINATE 60 MG: 125 INJECTION, POWDER, LYOPHILIZED, FOR SOLUTION INTRAMUSCULAR; INTRAVENOUS at 22:10

## 2018-01-01 RX ADMIN — PANTOPRAZOLE SODIUM 40 MG: 40 TABLET, DELAYED RELEASE ORAL at 05:46

## 2018-01-01 RX ADMIN — CEFUROXIME AXETIL 500 MG: 500 TABLET ORAL at 09:42

## 2018-01-01 RX ADMIN — CEFUROXIME AXETIL 500 MG: 500 TABLET ORAL at 13:35

## 2018-01-01 RX ADMIN — BUDESONIDE 1000 MCG: 0.5 SUSPENSION RESPIRATORY (INHALATION) at 08:13

## 2018-01-01 RX ADMIN — SODIUM CHLORIDE: 9 INJECTION, SOLUTION INTRAVENOUS at 23:15

## 2018-01-01 RX ADMIN — ATORVASTATIN CALCIUM 80 MG: 20 TABLET, FILM COATED ORAL at 19:47

## 2018-01-01 RX ADMIN — ETOMIDATE 20 MG: 20 INJECTION, SOLUTION INTRAVENOUS at 22:48

## 2018-01-01 RX ADMIN — Medication 10 ML: at 22:10

## 2018-01-01 RX ADMIN — FORMOTEROL FUMARATE DIHYDRATE 20 MCG: 20 SOLUTION RESPIRATORY (INHALATION) at 20:27

## 2018-01-01 RX ADMIN — FERROUS SULFATE TAB 325 MG (65 MG ELEMENTAL FE) 325 MG: 325 (65 FE) TAB at 13:50

## 2018-01-01 RX ADMIN — IPRATROPIUM BROMIDE AND ALBUTEROL SULFATE 1 AMPULE: .5; 3 SOLUTION RESPIRATORY (INHALATION) at 15:33

## 2018-01-01 RX ADMIN — IPRATROPIUM BROMIDE AND ALBUTEROL SULFATE 1 AMPULE: .5; 3 SOLUTION RESPIRATORY (INHALATION) at 01:56

## 2018-01-01 RX ADMIN — CEFEPIME 2 G: 2 INJECTION, POWDER, FOR SOLUTION INTRAMUSCULAR; INTRAVENOUS at 09:00

## 2018-01-01 RX ADMIN — PRAMIPEXOLE DIHYDROCHLORIDE 1.5 MG: 1 TABLET ORAL at 21:04

## 2018-01-01 RX ADMIN — IPRATROPIUM BROMIDE AND ALBUTEROL SULFATE 1 AMPULE: 2.5; .5 SOLUTION RESPIRATORY (INHALATION) at 20:07

## 2018-01-01 RX ADMIN — IPRATROPIUM BROMIDE AND ALBUTEROL SULFATE 1 AMPULE: .5; 3 SOLUTION RESPIRATORY (INHALATION) at 11:43

## 2018-01-01 RX ADMIN — METHYLPREDNISOLONE SODIUM SUCCINATE 40 MG: 40 INJECTION, POWDER, FOR SOLUTION INTRAMUSCULAR; INTRAVENOUS at 04:08

## 2018-01-01 RX ADMIN — IPRATROPIUM BROMIDE AND ALBUTEROL SULFATE 1 AMPULE: .5; 3 SOLUTION RESPIRATORY (INHALATION) at 08:26

## 2018-01-01 RX ADMIN — CEFTRIAXONE SODIUM 2 G: 2 INJECTION, POWDER, FOR SOLUTION INTRAMUSCULAR; INTRAVENOUS at 14:43

## 2018-01-01 RX ADMIN — PRIMIDONE 100 MG: 50 TABLET ORAL at 20:34

## 2018-01-01 RX ADMIN — CEFTRIAXONE SODIUM 2 G: 2 INJECTION, POWDER, FOR SOLUTION INTRAMUSCULAR; INTRAVENOUS at 14:26

## 2018-01-01 RX ADMIN — ASPIRIN 81 MG 81 MG: 81 TABLET ORAL at 10:24

## 2018-01-01 RX ADMIN — ATORVASTATIN CALCIUM 80 MG: 40 TABLET, FILM COATED ORAL at 09:42

## 2018-01-01 RX ADMIN — TOPIRAMATE 50 MG: 25 TABLET, FILM COATED ORAL at 21:42

## 2018-01-01 RX ADMIN — TOPIRAMATE 50 MG: 25 TABLET, FILM COATED ORAL at 08:08

## 2018-01-01 RX ADMIN — ATORVASTATIN CALCIUM 80 MG: 40 TABLET, FILM COATED ORAL at 08:13

## 2018-01-01 RX ADMIN — ACETAMINOPHEN 650 MG: 325 TABLET ORAL at 08:13

## 2018-01-01 RX ADMIN — TAMSULOSIN HYDROCHLORIDE 0.4 MG: 0.4 CAPSULE ORAL at 09:55

## 2018-01-01 RX ADMIN — MEMANTINE HYDROCHLORIDE 10 MG: 10 TABLET, FILM COATED ORAL at 09:51

## 2018-01-01 RX ADMIN — PREDNISONE 40 MG: 20 TABLET ORAL at 12:32

## 2018-01-01 RX ADMIN — ATORVASTATIN CALCIUM 80 MG: 40 TABLET, FILM COATED ORAL at 13:53

## 2018-01-01 RX ADMIN — METOPROLOL SUCCINATE 100 MG: 100 TABLET, FILM COATED, EXTENDED RELEASE ORAL at 21:15

## 2018-01-01 RX ADMIN — IPRATROPIUM BROMIDE AND ALBUTEROL SULFATE 1 AMPULE: .5; 3 SOLUTION RESPIRATORY (INHALATION) at 12:15

## 2018-01-01 RX ADMIN — Medication 10 ML: at 21:45

## 2018-01-01 RX ADMIN — LEVOFLOXACIN 500 MG: 5 INJECTION, SOLUTION INTRAVENOUS at 11:46

## 2018-01-01 RX ADMIN — CEFTRIAXONE SODIUM 2 G: 2 INJECTION, POWDER, FOR SOLUTION INTRAMUSCULAR; INTRAVENOUS at 13:39

## 2018-01-01 RX ADMIN — CEFUROXIME AXETIL 500 MG: 500 TABLET ORAL at 20:34

## 2018-01-01 RX ADMIN — PRAMIPEXOLE DIHYDROCHLORIDE 1.5 MG: 1 TABLET ORAL at 22:10

## 2018-01-01 RX ADMIN — FORMOTEROL FUMARATE DIHYDRATE 20 MCG: 20 SOLUTION RESPIRATORY (INHALATION) at 20:11

## 2018-01-01 RX ADMIN — WARFARIN SODIUM 5 MG: 5 TABLET ORAL at 20:33

## 2018-01-01 RX ADMIN — PRIMIDONE 100 MG: 50 TABLET ORAL at 21:44

## 2018-01-01 RX ADMIN — MEMANTINE HYDROCHLORIDE 10 MG: 10 TABLET, FILM COATED ORAL at 20:11

## 2018-01-01 RX ADMIN — CEFUROXIME AXETIL 500 MG: 500 TABLET ORAL at 21:44

## 2018-01-01 RX ADMIN — PRIMIDONE 100 MG: 50 TABLET ORAL at 13:51

## 2018-01-01 RX ADMIN — PRAMIPEXOLE DIHYDROCHLORIDE 1.5 MG: 1 TABLET ORAL at 08:53

## 2018-01-01 RX ADMIN — IOPAMIDOL 110 ML: 755 INJECTION, SOLUTION INTRAVENOUS at 11:31

## 2018-01-01 RX ADMIN — PRAMIPEXOLE DIHYDROCHLORIDE 1.5 MG: 1 TABLET ORAL at 13:29

## 2018-01-01 RX ADMIN — ASPIRIN 81 MG 81 MG: 81 TABLET ORAL at 08:51

## 2018-01-01 RX ADMIN — SODIUM PHOSPHATE, MONOBASIC, MONOHYDRATE 15 MMOL: 276; 142 INJECTION, SOLUTION INTRAVENOUS at 08:44

## 2018-01-01 RX ADMIN — BUDESONIDE 1000 MCG: 0.5 SUSPENSION RESPIRATORY (INHALATION) at 20:12

## 2018-01-01 RX ADMIN — VECURONIUM BROMIDE 10 MG: 1 INJECTION, POWDER, LYOPHILIZED, FOR SOLUTION INTRAVENOUS at 22:49

## 2018-01-01 RX ADMIN — METOPROLOL SUCCINATE 100 MG: 100 TABLET, EXTENDED RELEASE ORAL at 09:51

## 2018-01-01 RX ADMIN — GUAIFENESIN 400 MG: 400 TABLET ORAL at 09:55

## 2018-01-01 RX ADMIN — PANTOPRAZOLE SODIUM 40 MG: 40 TABLET, DELAYED RELEASE ORAL at 06:02

## 2018-01-01 RX ADMIN — METHYLPREDNISOLONE SODIUM SUCCINATE 40 MG: 40 INJECTION, POWDER, LYOPHILIZED, FOR SOLUTION INTRAMUSCULAR; INTRAVENOUS at 03:16

## 2018-01-01 RX ADMIN — FORMOTEROL FUMARATE DIHYDRATE 20 MCG: 20 SOLUTION RESPIRATORY (INHALATION) at 07:58

## 2018-01-01 RX ADMIN — MEMANTINE HYDROCHLORIDE 10 MG: 10 TABLET, FILM COATED ORAL at 21:15

## 2018-01-01 RX ADMIN — ASPIRIN 81 MG 81 MG: 81 TABLET ORAL at 09:49

## 2018-01-01 RX ADMIN — METOPROLOL SUCCINATE 100 MG: 100 TABLET, FILM COATED, EXTENDED RELEASE ORAL at 22:11

## 2018-01-01 RX ADMIN — PRAMIPEXOLE DIHYDROCHLORIDE 1.5 MG: 1 TABLET ORAL at 23:46

## 2018-01-01 RX ADMIN — ATORVASTATIN CALCIUM 80 MG: 40 TABLET, FILM COATED ORAL at 10:03

## 2018-01-01 RX ADMIN — PROPOFOL 10 MCG/KG/MIN: 10 INJECTION, EMULSION INTRAVENOUS at 04:23

## 2018-01-01 RX ADMIN — DOXYCYCLINE 100 MG: 100 INJECTION, POWDER, LYOPHILIZED, FOR SOLUTION INTRAVENOUS at 05:46

## 2018-01-01 RX ADMIN — PRAMIPEXOLE DIHYDROCHLORIDE 1.5 MG: 1 TABLET ORAL at 15:55

## 2018-01-01 RX ADMIN — PRAMIPEXOLE DIHYDROCHLORIDE 1.5 MG: 1 TABLET ORAL at 13:47

## 2018-01-01 RX ADMIN — FORMOTEROL FUMARATE DIHYDRATE 20 MCG: 20 SOLUTION RESPIRATORY (INHALATION) at 08:34

## 2018-01-01 RX ADMIN — PRAMIPEXOLE DIHYDROCHLORIDE 1.5 MG: 1 TABLET ORAL at 21:11

## 2018-01-01 RX ADMIN — TOPIRAMATE 50 MG: 25 TABLET, FILM COATED ORAL at 09:53

## 2018-01-01 RX ADMIN — ASPIRIN 81 MG 81 MG: 81 TABLET ORAL at 09:55

## 2018-01-01 RX ADMIN — SODIUM CHLORIDE 1000 ML: 9 INJECTION, SOLUTION INTRAVENOUS at 20:25

## 2018-01-01 RX ADMIN — FUROSEMIDE 40 MG: 40 TABLET ORAL at 08:57

## 2018-01-01 RX ADMIN — IOPAMIDOL 100 ML: 755 INJECTION, SOLUTION INTRAVENOUS at 19:06

## 2018-01-01 RX ADMIN — Medication 10 ML: at 09:40

## 2018-01-01 RX ADMIN — IPRATROPIUM BROMIDE AND ALBUTEROL SULFATE 1 AMPULE: .5; 3 SOLUTION RESPIRATORY (INHALATION) at 15:39

## 2018-01-01 RX ADMIN — FORMOTEROL FUMARATE DIHYDRATE 20 MCG: 20 SOLUTION RESPIRATORY (INHALATION) at 07:37

## 2018-01-01 RX ADMIN — LEVOFLOXACIN 500 MG: 5 INJECTION, SOLUTION INTRAVENOUS at 16:35

## 2018-01-01 RX ADMIN — SODIUM CHLORIDE 500 ML: 9 INJECTION, SOLUTION INTRAVENOUS at 19:49

## 2018-01-01 RX ADMIN — METHYLPREDNISOLONE SODIUM SUCCINATE 20 MG: 40 INJECTION, POWDER, LYOPHILIZED, FOR SOLUTION INTRAMUSCULAR; INTRAVENOUS at 15:58

## 2018-01-01 RX ADMIN — Medication 10 ML: at 15:59

## 2018-01-01 RX ADMIN — IPRATROPIUM BROMIDE AND ALBUTEROL SULFATE 1 AMPULE: .5; 3 SOLUTION RESPIRATORY (INHALATION) at 08:18

## 2018-01-01 RX ADMIN — NALXONE HYDROCHLORIDE 1 MG: 0.4 INJECTION INTRAMUSCULAR; INTRAVENOUS; SUBCUTANEOUS at 20:25

## 2018-01-01 RX ADMIN — MEMANTINE HYDROCHLORIDE 10 MG: 10 TABLET, FILM COATED ORAL at 09:36

## 2018-01-01 RX ADMIN — Medication 10 ML: at 08:31

## 2018-01-01 RX ADMIN — ACETAMINOPHEN 650 MG: 325 TABLET ORAL at 21:10

## 2018-01-01 RX ADMIN — FORMOTEROL FUMARATE DIHYDRATE 20 MCG: 20 SOLUTION RESPIRATORY (INHALATION) at 08:13

## 2018-01-01 RX ADMIN — TOPIRAMATE 50 MG: 25 TABLET, FILM COATED ORAL at 08:12

## 2018-01-01 RX ADMIN — FERROUS SULFATE TAB 325 MG (65 MG ELEMENTAL FE) 325 MG: 325 (65 FE) TAB at 08:50

## 2018-01-01 RX ADMIN — METHYLPREDNISOLONE SODIUM SUCCINATE 40 MG: 40 INJECTION, POWDER, LYOPHILIZED, FOR SOLUTION INTRAMUSCULAR; INTRAVENOUS at 19:40

## 2018-01-01 RX ADMIN — METHYLPREDNISOLONE SODIUM SUCCINATE 125 MG: 125 INJECTION, POWDER, FOR SOLUTION INTRAMUSCULAR; INTRAVENOUS at 19:50

## 2018-01-01 RX ADMIN — PRAMIPEXOLE DIHYDROCHLORIDE 1.5 MG: 1 TABLET ORAL at 09:41

## 2018-01-01 RX ADMIN — METOPROLOL SUCCINATE 100 MG: 100 TABLET, EXTENDED RELEASE ORAL at 08:13

## 2018-01-01 RX ADMIN — PETROLATUM: 420 OINTMENT TOPICAL at 08:13

## 2018-01-01 RX ADMIN — TOPIRAMATE 50 MG: 25 TABLET, FILM COATED ORAL at 21:05

## 2018-01-01 RX ADMIN — Medication 10 ML: at 09:57

## 2018-01-01 RX ADMIN — METHYLPREDNISOLONE SODIUM SUCCINATE 40 MG: 40 INJECTION, POWDER, FOR SOLUTION INTRAMUSCULAR; INTRAVENOUS at 15:41

## 2018-01-01 RX ADMIN — METHYLPREDNISOLONE SODIUM SUCCINATE 20 MG: 40 INJECTION, POWDER, LYOPHILIZED, FOR SOLUTION INTRAMUSCULAR; INTRAVENOUS at 04:14

## 2018-01-01 RX ADMIN — WARFARIN SODIUM 9 MG: 6 TABLET ORAL at 18:16

## 2018-01-01 RX ADMIN — PRAMIPEXOLE DIHYDROCHLORIDE 1.5 MG: 1 TABLET ORAL at 23:17

## 2018-01-01 RX ADMIN — POTASSIUM & SODIUM PHOSPHATES POWDER PACK 280-160-250 MG 250 MG: 280-160-250 PACK at 17:29

## 2018-01-01 RX ADMIN — FUROSEMIDE 40 MG: 40 TABLET ORAL at 09:55

## 2018-01-01 RX ADMIN — ACETAMINOPHEN 650 MG: 325 TABLET ORAL at 11:59

## 2018-01-01 RX ADMIN — BUDESONIDE 1000 MCG: 0.5 SUSPENSION RESPIRATORY (INHALATION) at 22:30

## 2018-01-01 RX ADMIN — MAGNESIUM SULFATE HEPTAHYDRATE 2 G: 40 INJECTION, SOLUTION INTRAVENOUS at 01:55

## 2018-01-01 RX ADMIN — IPRATROPIUM BROMIDE AND ALBUTEROL SULFATE 1 AMPULE: .5; 3 SOLUTION RESPIRATORY (INHALATION) at 11:45

## 2018-01-01 RX ADMIN — IPRATROPIUM BROMIDE AND ALBUTEROL SULFATE 1 AMPULE: .5; 3 SOLUTION RESPIRATORY (INHALATION) at 20:27

## 2018-01-01 RX ADMIN — ATORVASTATIN CALCIUM 80 MG: 40 TABLET, FILM COATED ORAL at 08:51

## 2018-01-01 RX ADMIN — NALOXONE HYDROCHLORIDE 2 MG: 0.4 INJECTION, SOLUTION INTRAMUSCULAR; INTRAVENOUS; SUBCUTANEOUS at 00:02

## 2018-01-01 RX ADMIN — MEMANTINE HYDROCHLORIDE 10 MG: 10 TABLET, FILM COATED ORAL at 08:50

## 2018-01-01 RX ADMIN — CEFUROXIME AXETIL 500 MG: 500 TABLET ORAL at 08:51

## 2018-01-01 RX ADMIN — METOPROLOL SUCCINATE 100 MG: 100 TABLET, FILM COATED, EXTENDED RELEASE ORAL at 20:34

## 2018-01-01 RX ADMIN — Medication 10 ML: at 15:42

## 2018-01-01 RX ADMIN — IPRATROPIUM BROMIDE AND ALBUTEROL SULFATE 1 AMPULE: 2.5; .5 SOLUTION RESPIRATORY (INHALATION) at 17:17

## 2018-01-01 RX ADMIN — TOPIRAMATE 50 MG: 25 TABLET, FILM COATED ORAL at 20:33

## 2018-01-01 RX ADMIN — TAMSULOSIN HYDROCHLORIDE 0.4 MG: 0.4 CAPSULE ORAL at 08:57

## 2018-01-01 RX ADMIN — FORMOTEROL FUMARATE DIHYDRATE 20 MCG: 20 SOLUTION RESPIRATORY (INHALATION) at 20:17

## 2018-01-01 RX ADMIN — METHYLPREDNISOLONE SODIUM SUCCINATE 125 MG: 125 INJECTION, POWDER, FOR SOLUTION INTRAMUSCULAR; INTRAVENOUS at 01:55

## 2018-01-01 RX ADMIN — SUCCINYLCHOLINE CHLORIDE 200 MG: 20 INJECTION, SOLUTION INTRAMUSCULAR; INTRAVENOUS at 22:49

## 2018-01-01 RX ADMIN — SODIUM CHLORIDE 1000 ML: 9 INJECTION, SOLUTION INTRAVENOUS at 10:25

## 2018-01-01 RX ADMIN — Medication 10 ML: at 13:52

## 2018-01-01 RX ADMIN — ATORVASTATIN CALCIUM 80 MG: 40 TABLET, FILM COATED ORAL at 08:56

## 2018-01-01 RX ADMIN — ACETAMINOPHEN 1000 MG: 500 TABLET ORAL at 21:24

## 2018-01-01 RX ADMIN — POTASSIUM & SODIUM PHOSPHATES POWDER PACK 280-160-250 MG 250 MG: 280-160-250 PACK at 13:00

## 2018-01-01 RX ADMIN — FERROUS SULFATE TAB 325 MG (65 MG ELEMENTAL FE) 325 MG: 325 (65 FE) TAB at 09:36

## 2018-01-01 RX ADMIN — BUDESONIDE 1000 MCG: 0.5 SUSPENSION RESPIRATORY (INHALATION) at 08:31

## 2018-01-01 RX ADMIN — PROCHLORPERAZINE EDISYLATE 10 MG: 5 INJECTION INTRAMUSCULAR; INTRAVENOUS at 21:24

## 2018-01-01 RX ADMIN — BUDESONIDE 500 MCG: 0.5 SUSPENSION RESPIRATORY (INHALATION) at 07:37

## 2018-01-01 RX ADMIN — DOXYCYCLINE 100 MG: 100 INJECTION, POWDER, LYOPHILIZED, FOR SOLUTION INTRAVENOUS at 18:07

## 2018-01-01 RX ADMIN — PRAMIPEXOLE DIHYDROCHLORIDE 1.5 MG: 1 TABLET ORAL at 13:45

## 2018-01-01 RX ADMIN — FORMOTEROL FUMARATE DIHYDRATE 20 MCG: 20 SOLUTION RESPIRATORY (INHALATION) at 19:42

## 2018-01-01 RX ADMIN — LEVOFLOXACIN 500 MG: 5 INJECTION, SOLUTION INTRAVENOUS at 02:52

## 2018-01-01 RX ADMIN — TOPIRAMATE 50 MG: 25 TABLET, FILM COATED ORAL at 15:10

## 2018-01-01 RX ADMIN — WATER 1 G: 1 INJECTION INTRAMUSCULAR; INTRAVENOUS; SUBCUTANEOUS at 01:56

## 2018-01-01 RX ADMIN — PRAMIPEXOLE DIHYDROCHLORIDE 1.5 MG: 1 TABLET ORAL at 15:07

## 2018-01-01 RX ADMIN — BUDESONIDE 500 MCG: 0.5 INHALANT RESPIRATORY (INHALATION) at 07:58

## 2018-01-01 RX ADMIN — IPRATROPIUM BROMIDE AND ALBUTEROL SULFATE 1 AMPULE: .5; 3 SOLUTION RESPIRATORY (INHALATION) at 11:42

## 2018-01-01 RX ADMIN — WARFARIN SODIUM 8 MG: 4 TABLET ORAL at 17:44

## 2018-01-01 RX ADMIN — MEMANTINE HYDROCHLORIDE 10 MG: 10 TABLET, FILM COATED ORAL at 22:10

## 2018-01-01 RX ADMIN — KETOROLAC TROMETHAMINE 15 MG: 30 INJECTION, SOLUTION INTRAMUSCULAR at 19:50

## 2018-01-01 RX ADMIN — MEMANTINE HYDROCHLORIDE 10 MG: 10 TABLET, FILM COATED ORAL at 20:34

## 2018-01-01 RX ADMIN — PRIMIDONE 100 MG: 50 TABLET ORAL at 10:21

## 2018-01-01 RX ADMIN — BUDESONIDE 500 MCG: 0.5 SUSPENSION RESPIRATORY (INHALATION) at 07:55

## 2018-01-01 RX ADMIN — PREDNISONE 40 MG: 20 TABLET ORAL at 09:42

## 2018-01-01 RX ADMIN — IPRATROPIUM BROMIDE AND ALBUTEROL SULFATE 1 AMPULE: .5; 3 SOLUTION RESPIRATORY (INHALATION) at 15:40

## 2018-01-01 RX ADMIN — TOPIRAMATE 50 MG: 25 TABLET, FILM COATED ORAL at 21:11

## 2018-01-01 RX ADMIN — PRAMIPEXOLE DIHYDROCHLORIDE 1.5 MG: 1 TABLET ORAL at 13:51

## 2018-01-01 RX ADMIN — Medication 10 ML: at 14:43

## 2018-01-01 RX ADMIN — ATORVASTATIN CALCIUM 80 MG: 40 TABLET, FILM COATED ORAL at 09:36

## 2018-01-01 RX ADMIN — PRAMIPEXOLE DIHYDROCHLORIDE 1.5 MG: 1 TABLET ORAL at 16:41

## 2018-01-01 RX ADMIN — NALXONE HYDROCHLORIDE 1 MG: 0.4 INJECTION INTRAMUSCULAR; INTRAVENOUS; SUBCUTANEOUS at 20:16

## 2018-01-01 RX ADMIN — GUAIFENESIN 400 MG: 400 TABLET ORAL at 14:43

## 2018-01-01 RX ADMIN — MEMANTINE HYDROCHLORIDE 10 MG: 10 TABLET, FILM COATED ORAL at 21:44

## 2018-01-01 RX ADMIN — PRAMIPEXOLE DIHYDROCHLORIDE 1.5 MG: 1 TABLET ORAL at 23:26

## 2018-01-01 RX ADMIN — IPRATROPIUM BROMIDE AND ALBUTEROL SULFATE 1 AMPULE: .5; 3 SOLUTION RESPIRATORY (INHALATION) at 19:15

## 2018-01-01 RX ADMIN — PRIMIDONE 100 MG: 50 TABLET ORAL at 09:40

## 2018-01-01 RX ADMIN — BUDESONIDE 1000 MCG: 0.5 SUSPENSION RESPIRATORY (INHALATION) at 19:42

## 2018-01-01 RX ADMIN — ACETAMINOPHEN 650 MG: 325 TABLET ORAL at 10:13

## 2018-01-01 RX ADMIN — ASPIRIN 81 MG 81 MG: 81 TABLET ORAL at 08:49

## 2018-01-01 RX ADMIN — IPRATROPIUM BROMIDE AND ALBUTEROL SULFATE 1 AMPULE: 2.5; .5 SOLUTION RESPIRATORY (INHALATION) at 12:12

## 2018-01-01 RX ADMIN — ATORVASTATIN CALCIUM 80 MG: 40 TABLET, FILM COATED ORAL at 08:50

## 2018-01-01 RX ADMIN — NALXONE HYDROCHLORIDE 0.4 MG: 0.4 INJECTION INTRAMUSCULAR; INTRAVENOUS; SUBCUTANEOUS at 23:32

## 2018-01-01 RX ADMIN — MEMANTINE HYDROCHLORIDE 10 MG: 10 TABLET, FILM COATED ORAL at 13:51

## 2018-01-01 RX ADMIN — BUDESONIDE 500 MCG: 0.5 SUSPENSION RESPIRATORY (INHALATION) at 22:24

## 2018-01-01 RX ADMIN — WARFARIN SODIUM 7 MG: 3 TABLET ORAL at 19:11

## 2018-01-01 RX ADMIN — TETANUS TOXOID, REDUCED DIPHTHERIA TOXOID AND ACELLULAR PERTUSSIS VACCINE, ADSORBED 0.5 ML: 5; 2.5; 8; 8; 2.5 SUSPENSION INTRAMUSCULAR at 10:55

## 2018-01-01 RX ADMIN — ACETAMINOPHEN 650 MG: 325 TABLET ORAL at 23:49

## 2018-01-01 RX ADMIN — BUDESONIDE 500 MCG: 0.5 INHALANT RESPIRATORY (INHALATION) at 20:27

## 2018-01-01 RX ADMIN — SODIUM CHLORIDE: 9 INJECTION, SOLUTION INTRAVENOUS at 19:13

## 2018-01-01 RX ADMIN — PETROLATUM: 420 OINTMENT TOPICAL at 09:57

## 2018-01-01 RX ADMIN — METHYLPREDNISOLONE SODIUM SUCCINATE 80 MG: 125 INJECTION, POWDER, LYOPHILIZED, FOR SOLUTION INTRAMUSCULAR; INTRAVENOUS at 14:23

## 2018-01-01 RX ADMIN — ATORVASTATIN CALCIUM 80 MG: 40 TABLET, FILM COATED ORAL at 08:30

## 2018-01-01 RX ADMIN — FUROSEMIDE 40 MG: 40 TABLET ORAL at 08:09

## 2018-01-01 RX ADMIN — IPRATROPIUM BROMIDE AND ALBUTEROL SULFATE 1 AMPULE: .5; 3 SOLUTION RESPIRATORY (INHALATION) at 18:43

## 2018-01-01 RX ADMIN — ACETAMINOPHEN 650 MG: 325 TABLET ORAL at 13:43

## 2018-01-01 RX ADMIN — MEMANTINE HYDROCHLORIDE 10 MG: 10 TABLET, FILM COATED ORAL at 21:07

## 2018-01-01 RX ADMIN — BUDESONIDE 500 MCG: 0.5 INHALANT RESPIRATORY (INHALATION) at 20:17

## 2018-01-01 RX ADMIN — Medication 10 ML: at 23:14

## 2018-01-01 RX ADMIN — PRAMIPEXOLE DIHYDROCHLORIDE 1.5 MG: 1 TABLET ORAL at 10:31

## 2018-01-01 RX ADMIN — PRIMIDONE 100 MG: 50 TABLET ORAL at 22:11

## 2018-01-01 RX ADMIN — MEMANTINE HYDROCHLORIDE 10 MG: 10 TABLET, FILM COATED ORAL at 08:13

## 2018-01-01 RX ADMIN — Medication 10 ML: at 20:35

## 2018-01-01 RX ADMIN — BUDESONIDE 500 MCG: 0.5 SUSPENSION RESPIRATORY (INHALATION) at 08:18

## 2018-01-01 RX ADMIN — Medication 10 ML: at 04:15

## 2018-01-01 RX ADMIN — METOPROLOL SUCCINATE 100 MG: 100 TABLET, EXTENDED RELEASE ORAL at 08:56

## 2018-01-01 RX ADMIN — FORMOTEROL FUMARATE DIHYDRATE 20 MCG: 20 SOLUTION RESPIRATORY (INHALATION) at 08:32

## 2018-01-01 RX ADMIN — TAMSULOSIN HYDROCHLORIDE 0.4 MG: 0.4 CAPSULE ORAL at 09:51

## 2018-01-01 RX ADMIN — PRAMIPEXOLE DIHYDROCHLORIDE 1.5 MG: 1 TABLET ORAL at 08:08

## 2018-01-01 RX ADMIN — METOPROLOL SUCCINATE 100 MG: 100 TABLET, FILM COATED, EXTENDED RELEASE ORAL at 13:51

## 2018-01-01 RX ADMIN — IPRATROPIUM BROMIDE AND ALBUTEROL SULFATE 3 AMPULE: .5; 3 SOLUTION RESPIRATORY (INHALATION) at 21:31

## 2018-01-01 RX ADMIN — IPRATROPIUM BROMIDE AND ALBUTEROL SULFATE 1 AMPULE: .5; 3 SOLUTION RESPIRATORY (INHALATION) at 07:37

## 2018-01-01 RX ADMIN — MEMANTINE HYDROCHLORIDE 10 MG: 10 TABLET, FILM COATED ORAL at 19:48

## 2018-01-01 RX ADMIN — LOPERAMIDE HYDROCHLORIDE 2 MG: 2 CAPSULE ORAL at 00:34

## 2018-01-01 RX ADMIN — FORMOTEROL FUMARATE DIHYDRATE 20 MCG: 20 SOLUTION RESPIRATORY (INHALATION) at 22:24

## 2018-01-01 RX ADMIN — FERROUS SULFATE TAB 325 MG (65 MG ELEMENTAL FE) 325 MG: 325 (65 FE) TAB at 09:42

## 2018-01-01 RX ADMIN — TAMSULOSIN HYDROCHLORIDE 0.4 MG: 0.4 CAPSULE ORAL at 08:13

## 2018-01-01 RX ADMIN — TAMSULOSIN HYDROCHLORIDE 0.4 MG: 0.4 CAPSULE ORAL at 08:09

## 2018-01-01 RX ADMIN — ATORVASTATIN CALCIUM 80 MG: 20 TABLET, FILM COATED ORAL at 20:11

## 2018-01-01 RX ADMIN — IPRATROPIUM BROMIDE AND ALBUTEROL SULFATE 1 AMPULE: .5; 3 SOLUTION RESPIRATORY (INHALATION) at 20:17

## 2018-01-01 RX ADMIN — BUDESONIDE 500 MCG: 0.5 INHALANT RESPIRATORY (INHALATION) at 08:27

## 2018-01-01 RX ADMIN — TOPIRAMATE 50 MG: 25 TABLET, FILM COATED ORAL at 08:56

## 2018-01-01 RX ADMIN — PRAMIPEXOLE DIHYDROCHLORIDE 1.5 MG: 1 TABLET ORAL at 09:54

## 2018-01-01 RX ADMIN — WARFARIN SODIUM 8 MG: 4 TABLET ORAL at 17:32

## 2018-01-01 RX ADMIN — IPRATROPIUM BROMIDE AND ALBUTEROL SULFATE 1 AMPULE: 2.5; .5 SOLUTION RESPIRATORY (INHALATION) at 20:12

## 2018-01-01 RX ADMIN — PRAMIPEXOLE DIHYDROCHLORIDE 1.5 MG: 1 TABLET ORAL at 15:12

## 2018-01-01 RX ADMIN — PANTOPRAZOLE SODIUM 40 MG: 40 TABLET, DELAYED RELEASE ORAL at 06:40

## 2018-01-01 RX ADMIN — MEMANTINE HYDROCHLORIDE 10 MG: 10 TABLET, FILM COATED ORAL at 08:30

## 2018-01-01 RX ADMIN — IPRATROPIUM BROMIDE AND ALBUTEROL SULFATE 1 AMPULE: .5; 3 SOLUTION RESPIRATORY (INHALATION) at 01:55

## 2018-01-01 RX ADMIN — PRIMIDONE 100 MG: 50 TABLET ORAL at 21:11

## 2018-01-01 RX ADMIN — CEFUROXIME AXETIL 500 MG: 500 TABLET ORAL at 22:51

## 2018-01-01 RX ADMIN — FORMOTEROL FUMARATE DIHYDRATE 20 MCG: 20 SOLUTION RESPIRATORY (INHALATION) at 20:12

## 2018-01-01 RX ADMIN — ASPIRIN 81 MG 81 MG: 81 TABLET ORAL at 08:30

## 2018-01-01 RX ADMIN — ENOXAPARIN SODIUM 40 MG: 40 INJECTION SUBCUTANEOUS at 08:57

## 2018-01-01 RX ADMIN — MEMANTINE HYDROCHLORIDE 10 MG: 10 TABLET, FILM COATED ORAL at 08:53

## 2018-01-01 RX ADMIN — IPRATROPIUM BROMIDE AND ALBUTEROL SULFATE 1 AMPULE: .5; 3 SOLUTION RESPIRATORY (INHALATION) at 15:27

## 2018-01-01 RX ADMIN — IPRATROPIUM BROMIDE AND ALBUTEROL SULFATE 1 AMPULE: 2.5; .5 SOLUTION RESPIRATORY (INHALATION) at 11:19

## 2018-01-01 RX ADMIN — WARFARIN SODIUM 8 MG: 4 TABLET ORAL at 17:29

## 2018-01-01 RX ADMIN — Medication 10 ML: at 13:54

## 2018-01-01 RX ADMIN — GUAIFENESIN 400 MG: 400 TABLET ORAL at 19:47

## 2018-01-01 RX ADMIN — IPRATROPIUM BROMIDE AND ALBUTEROL SULFATE 1 AMPULE: 2.5; .5 SOLUTION RESPIRATORY (INHALATION) at 13:57

## 2018-01-01 RX ADMIN — PRAMIPEXOLE DIHYDROCHLORIDE 1.5 MG: 1 TABLET ORAL at 13:34

## 2018-01-01 RX ADMIN — CEFUROXIME AXETIL 500 MG: 500 TABLET ORAL at 08:30

## 2018-01-01 ASSESSMENT — PAIN SCALES - GENERAL
PAINLEVEL_OUTOF10: 0
PAINLEVEL_OUTOF10: 0
PAINLEVEL_OUTOF10: 1
PAINLEVEL_OUTOF10: 6
PAINLEVEL_OUTOF10: 0
PAINLEVEL_OUTOF10: 8
PAINLEVEL_OUTOF10: 0
PAINLEVEL_OUTOF10: 2
PAINLEVEL_OUTOF10: 0
PAINLEVEL_OUTOF10: 0
PAINLEVEL_OUTOF10: 4
PAINLEVEL_OUTOF10: 0
PAINLEVEL_OUTOF10: 10
PAINLEVEL_OUTOF10: 0
PAINLEVEL_OUTOF10: 0
PAINLEVEL_OUTOF10: 3
PAINLEVEL_OUTOF10: 0
PAINLEVEL_OUTOF10: 2
PAINLEVEL_OUTOF10: 5
PAINLEVEL_OUTOF10: 8
PAINLEVEL_OUTOF10: 0
PAINLEVEL_OUTOF10: 10
PAINLEVEL_OUTOF10: 0
PAINLEVEL_OUTOF10: 2
PAINLEVEL_OUTOF10: 0
PAINLEVEL_OUTOF10: 6
PAINLEVEL_OUTOF10: 0
PAINLEVEL_OUTOF10: 0
PAINLEVEL_OUTOF10: 6
PAINLEVEL_OUTOF10: 0
PAINLEVEL_OUTOF10: 3
PAINLEVEL_OUTOF10: 0
PAINLEVEL_OUTOF10: 8
PAINLEVEL_OUTOF10: 6
PAINLEVEL_OUTOF10: 0
PAINLEVEL_OUTOF10: 0

## 2018-01-01 ASSESSMENT — PULMONARY FUNCTION TESTS
PIF_VALUE: 15
PIF_VALUE: 13
PIF_VALUE: 46
PIF_VALUE: 31
PIF_VALUE: 15
PIF_VALUE: 52
PIF_VALUE: 1
PIF_VALUE: 40

## 2018-01-01 ASSESSMENT — PAIN DESCRIPTION - FREQUENCY
FREQUENCY: CONTINUOUS
FREQUENCY: CONTINUOUS
FREQUENCY: INTERMITTENT

## 2018-01-01 ASSESSMENT — PAIN DESCRIPTION - LOCATION
LOCATION: CHEST
LOCATION: HEAD
LOCATION: CHEST
LOCATION: RECTUM
LOCATION: HEAD

## 2018-01-01 ASSESSMENT — PAIN DESCRIPTION - PROGRESSION: CLINICAL_PROGRESSION: GRADUALLY IMPROVING

## 2018-01-01 ASSESSMENT — ENCOUNTER SYMPTOMS
SINUS PRESSURE: 0
NAUSEA: 0
NAUSEA: 0
VOMITING: 0
DYSPNEA ASSOCIATED WITH: EXERTION
DIARRHEA: 0
EYE PAIN: 0
VOMITING: 0
WHEEZING: 0
BACK PAIN: 0
COUGH: 1
EYE REDNESS: 0
SHORTNESS OF BREATH: 0
EYE PAIN: 0
COUGH: 0
BACK PAIN: 0
ABDOMINAL PAIN: 0
SHORTNESS OF BREATH: 1
SORE THROAT: 0
DIARRHEA: 0
EYE DISCHARGE: 0
DYSPNEA ASSOCIATED WITH: EXERTION
EYE DISCHARGE: 0
SINUS PRESSURE: 0
ABDOMINAL PAIN: 0
DYSPNEA ASSOCIATED WITH: EXERTION
SORE THROAT: 0
EYE REDNESS: 0
DYSPNEA ASSOCIATED WITH: EXERTION

## 2018-01-01 ASSESSMENT — PAIN DESCRIPTION - DESCRIPTORS
DESCRIPTORS: ACHING
DESCRIPTORS: ACHING;DISCOMFORT;HEADACHE
DESCRIPTORS: ACHING
DESCRIPTORS: ACHING;HEADACHE

## 2018-01-01 ASSESSMENT — PAIN DESCRIPTION - PAIN TYPE
TYPE: ACUTE PAIN
TYPE: OTHER (COMMENT)
TYPE: ACUTE PAIN

## 2018-01-01 ASSESSMENT — PAIN DESCRIPTION - ONSET: ONSET: ON-GOING

## 2018-01-01 ASSESSMENT — PAIN DESCRIPTION - ORIENTATION
ORIENTATION: LEFT
ORIENTATION: MID

## 2018-01-02 PROBLEM — J40 BRONCHITIS: Status: ACTIVE | Noted: 2018-01-02

## 2018-01-07 PROBLEM — R35.0 BENIGN PROSTATIC HYPERPLASIA WITH URINARY FREQUENCY: Status: ACTIVE | Noted: 2018-01-07

## 2018-01-07 PROBLEM — N40.1 BENIGN PROSTATIC HYPERPLASIA WITH URINARY FREQUENCY: Status: ACTIVE | Noted: 2018-01-07

## 2018-03-16 NOTE — ED PROVIDER NOTES
This patient is here for evaluation of a wound to his left hand. Patient states she had cracked skin on the knuckle of his 3rd digit of his left hand. This is been present for several weeks. This recently become painful, red and swollen. He went to the urgent care today who sent him here for evaluation due to concern of more extensive disease. He states he's also had left-sided chest pain. No shortness of breath. No nausea, vomiting, diarrhea, diaphoresis or other complaints. The history is provided by the patient and the spouse. Chest Pain   Pain location:  L chest  Pain quality: aching    Pain radiates to:  Does not radiate  Pain severity:  Moderate  Onset quality:  Gradual  Duration:  2 days  Timing:  Constant  Progression:  Worsening  Chronicity:  New  Relieved by:  None tried  Worsened by:  Nothing  Ineffective treatments:  None tried  Associated symptoms: fatigue and fever    Associated symptoms: no abdominal pain, no back pain, no cough, no headache, no nausea, no shortness of breath, no vomiting and no weakness    Rash   Location:  Hand and shoulder/arm  Shoulder/arm rash location:  L arm  Hand rash location:  L hand  Quality: painful and redness    Pain details:     Quality:  Aching    Severity:  Moderate    Onset quality:  Gradual    Duration:  1 week    Timing:  Constant    Progression:  Worsening  Severity:  Moderate  Onset quality:  Gradual  Duration:  1 week  Timing:  Constant  Progression:  Worsening  Chronicity:  New  Relieved by:  None tried  Worsened by:  Nothing  Ineffective treatments:  None tried  Associated symptoms: fatigue, fever, joint pain and myalgias    Associated symptoms: no abdominal pain, no diarrhea, no headaches, no nausea, no shortness of breath, no sore throat, not vomiting and not wheezing        Review of Systems   Constitutional: Positive for activity change, appetite change, chills, fatigue and fever. HENT: Negative for ear pain, sinus pressure and sore throat.

## 2018-03-22 NOTE — PROGRESS NOTES
MG tablet Take 7 mg by mouth four times a week 8 mg= Monday, Wednesday, Friday (5 PM)  7 mg= All other Days      primidone (MYSOLINE) 50 MG tablet Take 2 tablets by mouth 3 times daily 540 tablet 3    ipratropium-albuterol (DUONEB) 0.5-2.5 (3) MG/3ML SOLN nebulizer solution Inhale 3 mLs into the lungs every 6 hours as needed for Shortness of Breath 1080 mL 1    warfarin (COUMADIN) 4 MG tablet Take according to coumadin regimen. (Patient taking differently: Take 8 mg by mouth three times a week 8 mg= Monday, Wednesday, Friday (5 PM)  7 mg= All other Days) 90 tablet 1    potassium chloride (KLOR-CON M) 10 MEQ extended release tablet Take 1 tablet by mouth daily (Patient taking differently: Take 10 mEq by mouth every morning ) 90 tablet 3    ALPRAZolam (XANAX) 0.5 MG tablet TAKE 1 TABLET BY MOUTH TWICE DAILY. (Patient taking differently: Take 0.5 mg by mouth nightly .) 180 tablet 2    tamsulosin (FLOMAX) 0.4 MG capsule Take 1 capsule by mouth daily For enlarged prostate.  (Patient taking differently: Take 0.4 mg by mouth Daily with lunch For enlarged prostate.) 90 capsule 3    metoprolol succinate (TOPROL XL) 100 MG extended release tablet Take 1 tablet by mouth 2 times daily 180 tablet 3    furosemide (LASIX) 40 MG tablet TAKE 1 TABLET BY MOUTH DAILY (Patient taking differently: Take 40 mg by mouth every morning ) 90 tablet 3    atorvastatin (LIPITOR) 80 MG tablet TAKE 1 TABLET BY MOUTH DAILY (Patient taking differently: Take 80 mg by mouth every evening ) 90 tablet 3    tiotropium (SPIRIVA HANDIHALER) 18 MCG inhalation capsule Inhale 1 capsule into the lungs daily (Patient taking differently: Inhale 18 mcg into the lungs every morning ) 90 capsule 3    omeprazole (PRILOSEC) 40 MG delayed release capsule TAKE 1 CAPSULE BY MOUTH DAILY FOR STOMACH OR REFLUX (Patient taking differently: Take 40 mg by mouth every morning ) 90 capsule 1    sertraline (ZOLOFT) 100 MG tablet Take 1 tablet by mouth nightly 90 tablet 3    albuterol sulfate  (90 Base) MCG/ACT inhaler Inhale 2 puffs into the lungs every 6 hours as needed for Wheezing or Shortness of Breath 3 Inhaler 3    budesonide (PULMICORT) 0.5 MG/2ML nebulizer suspension USE 1 VIAL VIA NEBULIZER TWICE DAILY (Patient taking differently: Take 1 ampule by nebulization 2 times daily ) 360 mL 3    memantine (NAMENDA) 10 MG tablet Take 1 tablet by mouth 2 times daily 180 tablet 2    pramipexole (MIRAPEX) 1.5 MG tablet Take 1 tablet by mouth 3 times daily 90 tablet 2    formoterol (PERFOROMIST) 20 MCG/2ML nebulizer solution INHALE TWICE DAILY AS DIRECTED (Patient taking differently: Take 20 mcg by nebulization 2 times daily ) 120 mL 5    nitroGLYCERIN (NITROSTAT) 0.4 MG SL tablet Place 1 tablet under the tongue every 5 minutes as needed for Chest pain 25 tablet 3    aspirin 81 MG chewable tablet CHEW AND SWALLOW ONE TABLET EVERY DAY (Patient taking differently: Take 81 mg by mouth every morning ) 90 tablet 3    ferrous sulfate 325 (65 FE) MG tablet Take 1 tablet by mouth daily (with breakfast) 90 tablet 3    Blood Pressure Monitoring (ADULT BLOOD PRESSURE CUFF LG) KIT Check blood pressures daily 1 kit 0    Multiple Vitamins-Minerals (MULTIVITAL PO) Take 1 tablet by mouth every morning       OXYGEN Please provide nocturnal oxygen at 2 liters nasal cannula.   Diagnosis-nocturnal hypoxemia  Faxed to [facility] (Patient taking differently: 3 L/min by Nasal route continuous ) 1 Device 0    cefdinir (OMNICEF) 300 MG capsule Take 1 capsule by mouth 2 times daily for 10 days 20 capsule 0    doxycycline hyclate (VIBRA-TABS) 100 MG tablet Take 1 tablet by mouth 2 times daily for 10 days 20 tablet 0     Current Facility-Administered Medications on File Prior to Visit   Medication Dose Route Frequency Provider Last Rate Last Dose    ipratropium (ATROVENT) 0.02 % nebulizer solution 0.5 mg  0.5 mg Nebulization Once Jayleen Bass MD        albuterol (PROVENTIL) nebulizer

## 2018-04-30 PROBLEM — J40 BRONCHITIS: Status: RESOLVED | Noted: 2018-01-02 | Resolved: 2018-01-01

## 2018-06-02 PROBLEM — J96.00 ACUTE RESPIRATORY FAILURE (HCC): Status: ACTIVE | Noted: 2018-01-01

## 2018-06-02 PROBLEM — I63.9 STROKE OF UNKNOWN CAUSE (HCC): Status: ACTIVE | Noted: 2018-01-01

## 2018-06-02 NOTE — ED PROVIDER NOTES
Department of Emergency Medicine   ED  Provider Note  Admit Date/RoomTime: 6/2/2018  6:11 PM  ED Room: 19/19      History of Present Illness:  6/2/18, Time: 6:31 PM         Katherine Herndon is a 60 y.o. male presenting to the ED for aphasia, beginning 10 am this morning. The complaint has been constant, moderate in severity, and worsened by nothing. Patient's wife reports that she noticed the aphasia around 10 am this morning. At that time he was complaining of a headache and took tylenol. Patient's wife reports that the patient went to bed and slept most of the day. Patient is prescribed coumadin. Patient denies CP, SOB, cough, fever, chills, nausea, emesis, dizziness, loss of consciousness, recent surgery, recent travel, diaphoresis, or any other pertinent complaints. Review of Systems:   Pertinent positives and negatives are stated within HPI, all other systems reviewed and are negative.    --------------------------------------------- PAST HISTORY ---------------------------------------------  Past Medical History:  has a past medical history of Anticoagulated on Coumadin; Arthritis; Atrial fibrillation ([address] CAD (coronary artery disease); Cerebral artery occlusion with cerebral infarction ([address] Chronic respiratory failure with hypoxia ([facility]); COPD (chronic obstructive pulmonary disease) ([address] Diverticulitis; Emphysema of lung ([address] GERD (gastroesophageal reflux disease); Hyperlipidemia; Mitral stenosis; Musculoskeletal chest pain; ALVIN (obstructive sleep apnea); Parkinson's disease ([address] Rectal prolapse; and Rheumatic fever. Past Surgical History:  has a past surgical history that includes Coronary artery bypass graft (2/2010); Inguinal hernia repair (1970's); Endoscopy, colon, diagnostic; Rectal surgery (9/2015); ablation of dysrhythmic focus (10-7-2015); Mitral valve replacement; eye surgery; Cardiac valve replacement (2/3/2010); Colonoscopy (4/6/16); Colonoscopy (05/02/2017);  Upper gastrointestinal endoscopy (06/01/2016); and Upper gastrointestinal endoscopy (04/24/2017). Social History:  reports that he quit smoking about 17 months ago. His smoking use included Cigarettes. He started smoking about 47 years ago. He has a 88.00 pack-year smoking history. He has never used smokeless tobacco. He reports that he does not drink alcohol or use drugs. Family History: family history includes Alzheimer's Disease (age of onset: 47) in his brother; Emphysema in his father; Other in his sister; Stroke in his mother. The patients home medications have been reviewed. Allergies: Patient has no known allergies. ---------------------------------------------------PHYSICAL EXAM--------------------------------------    Constitutional/General: somnolent, not alert but arousable with minor stimulation  Head: Normocephalic and atraumatic  Eyes: PERRL, EOMI  Neck: Supple  Respiratory: Lungs clear to auscultation bilaterally, no wheezes, rales, or rhonchi. Not in respiratory distress  Cardiovascular:  Tachycardic. Regular rhythm. No murmurs, gallops, or rubs. 2+ distal pulses  GI:  Abdomen Soft, Non tender, Non distended. Musculoskeletal: Moves all extremities x 4. Warm and well perfused, no clubbing, cyanosis, or edema. Integument: skin warm and dry. No rashes. Neurologic:   Last known well time: 10:00 am  NIH Stroke Scale at time of initial evaluation: 6:15 pm  1A: Level of Consciousness 1 - not alert but arousable by minor stimulation to obey, answer or respond   1B: Ask Month and Age 2 - answers neither question correctly   1C:  Tell Patient To Open and Close Eyes, then Hand  Squeeze 0 - performs both tasks correctly   2: Test Horizontal Extraocular Movements 0 - normal   3: Test Visual Fields 0 - no visual loss   4: Test Facial Palsy 0 - normal symmetric movement   5A: Test Left Arm Motor Drift 0 - no drift, limb holds 90 (or 45) degrees for full 10 seconds   5B: Test Right Arm Motor Drift 0 - no drift, limb holds 90 (or 45) degrees for full 10 seconds   6A: Test Left Leg Motor Drift 0 - no drift; leg holds 30 degree position for full 5 seconds   6B: Test Right Leg Motor Drift 0 - no drift; leg holds 30 degree position for full 5 seconds   7: Test Limb Ataxia   (FNF/Heel-Shin) 0 - absent   8: Test Sensation 0 - normal; no sensory loss   9: Test Language/Aphasia 1 - mild to moderate aphasia; some obvious loss of fluency or facility of comprehension without significant limitation on ideas expressed or form of expression. Reduction of speech and/or comprehension, however, makes conversation about provided materials difficult or impossible. For example, in conversation about provided materials, examiner can identify picture or naming card content from patient's response. 10: Test Dysarthria 1 - mild to moderate, patient slurs at least some words and at worst, can be understood with some difficulty   11: Test Extinction/Inattention 0 - no abnormality   Total NIH Stroke Score: 5       -------------------------------------------------- RESULTS -------------------------------------------------  I have personally reviewed all laboratory and imaging results for this patient. Results are listed below.      LABS:  Results for orders placed or performed during the hospital encounter of 06/02/18   Troponin   Result Value Ref Range    Troponin <0.01 0.00 - 0.03 ng/mL   Protime-INR   Result Value Ref Range    Protime 23.6 (H) 9.3 - 12.4 sec    INR 2.1    APTT   Result Value Ref Range    aPTT 42.6 (H) 24.5 - 35.1 sec   Brain Natriuretic Peptide   Result Value Ref Range    Pro-BNP 1,157 (H) 0 - 125 pg/mL   CBC Auto Differential   Result Value Ref Range    WBC 8.8 4.5 - 11.5 E9/L    RBC 4.64 3.80 - 5.80 E12/L    Hemoglobin 13.6 12.5 - 16.5 g/dL    Hematocrit 45.7 37.0 - 54.0 %    MCV 98.5 80.0 - 99.9 fL    MCH 29.3 26.0 - 35.0 pg    MCHC 29.8 (L) 32.0 - 34.5 %    RDW 16.0 (H) 11.5 - 15.0 fL    Platelets 724 130 - 450 E9/L    MPV 11.6 7.0 - 12.0 fL    Neutrophils % 82.5 (H) 43.0 - 80.0 %    Immature Granulocytes % 0.3 0.0 - 5.0 %    Lymphocytes % 8.4 (L) 20.0 - 42.0 %    Monocytes % 8.3 2.0 - 12.0 %    Eosinophils % 0.0 0.0 - 6.0 %    Basophils % 0.5 0.0 - 2.0 %    Neutrophils # 7.26 1.80 - 7.30 E9/L    Immature Granulocytes # 0.03 E9/L    Lymphocytes # 0.74 (L) 1.50 - 4.00 E9/L    Monocytes # 0.73 0.10 - 0.95 E9/L    Eosinophils # 0.00 (L) 0.05 - 0.50 E9/L    Basophils # 0.04 0.00 - 0.20 E9/L   Comprehensive Metabolic Panel   Result Value Ref Range    Sodium 135 132 - 146 mmol/L    Potassium 4.7 3.5 - 5.0 mmol/L    Chloride 95 (L) 98 - 107 mmol/L    CO2 31 (H) 22 - 29 mmol/L    Anion Gap 9 7 - 16 mmol/L    Glucose 108 74 - 109 mg/dL    BUN 14 8 - 23 mg/dL    CREATININE 0.7 0.7 - 1.2 mg/dL    GFR Non-African American >60 >=60 mL/min/1.73    GFR African American >60     Calcium 8.5 (L) 8.6 - 10.2 mg/dL    Total Protein 6.9 6.4 - 8.3 g/dL    Alb 4.0 3.5 - 5.2 g/dL    Total Bilirubin <0.2 0.0 - 1.2 mg/dL    Alkaline Phosphatase 112 40 - 129 U/L    ALT 47 (H) 0 - 40 U/L    AST 48 (H) 0 - 39 U/L   Lactic Acid, Plasma   Result Value Ref Range    Lactic Acid 1.4 0.5 - 2.2 mmol/L   Blood Gas, Arterial   Result Value Ref Range    Date Analyzed [id number]     Time Analyzed 2103     Source: Blood Arterial     pH, Blood Gas 7.027 (LL) 7.350 - 7.450    PCO2 143.0 (HH) 35.0 - 45.0 mmHg    PO2 69.1 60.0 - 100.0 mmHg    HCO3 36.7 (H) 22.0 - 26.0 mmol/L    B.E. 1.4 -3.0 - 3.0 mmol/L    O2 Sat 87.5 (L) 92.0 - 98.5 %    PO2/FIO2 1.38 mmHg/%    AaDO2 114.0 mmHg    RI(T) 165 %    O2Hb 86.1 (L) 94.0 - 97.0 %    COHb 1.2 0.0 - 1.5 %    MetHb 0.4 0.0 - 1.5 %    O2 Content 16.5 mL/dL    HHb 12.3 (H) 0.0 - 5.0 %    tHb (est) 13.6 11.5 - 16.5 g/dL    Mode VM     FIO2 50.0 %    Date Of Collection [id number]     Time Collected 2057     Pt Temp 37.0 C     ID [id number]     Lab 10720     Critical(s) Notified Handed report to Dr/RN    POCT Glucose Result Value Ref Range    Meter Glucose 112 (H) 70 - 110 mg/dL   POCT Glucose   Result Value Ref Range    Glucose 112 mg/dL    QC OK? yes    POCT Glucose   Result Value Ref Range    Meter Glucose 102 70 - 110 mg/dL   EKG 12 Lead   Result Value Ref Range    Ventricular Rate 88 BPM    Atrial Rate 88 BPM    P-R Interval 140 ms    QRS Duration 86 ms    Q-T Interval 338 ms    QTc Calculation (Bazett) 408 ms    P Axis 80 degrees    R Axis 67 degrees    T Axis 61 degrees       RADIOLOGY:  Interpreted by Radiologist.  XR CHEST PORTABLE   Final Result   No acute cardiopulmonary process. CTA NECK W CONTRAST   Final Result   Atherosclerotic changes causing anatomical stenosis of close to 70% in   the most proximal segment of the left ICA. CTA HEAD W CONTRAST   Final Result   1. Unremarkable CTA of the brain. No major intracranial artery   occlusion is seen. No conspicuous intracranial aneurysm formation   observed. 2. Atherosclerotic changes seen in both carotid segments and in the   subarachnoid segment on the left side, but anatomic stenosis is no   higher than 50%. CT Brain Perfusion   Final Result   No definite areas of an acute ischemic infarct/core infarct. Some   imbalance of the cerebral circulation, as above commented. CT Head WO Contrast   Final Result   No acute intracranial abnormality. EKG:  This EKG is signed and interpreted by the EP. Time: 18:27  Rate: 88  Rhythm: normal sinus  Interpretation: normal sinus, no acute ischemic changes, no ectopy      ------------------------- NURSING NOTES AND VITALS REVIEWED ---------------------------   The nursing notes within the ED encounter and vital signs as below have been reviewed by myself.   BP (!) 99/52   Pulse 108   Temp 98.8 °F (37.1 °C) (Oral)   Resp 18   Ht 5' 4\" (1.626 m)   Wt 163 lb (73.9 kg)   SpO2 94%   BMI 27.98 kg/m²   Oxygen Saturation Interpretation: Normal    The patients available past medical medical decision making and emergency management    This patient has remained hemodynamically stable and been closely monitored during their ED course. PROCEDURE  6/2/18       Time: 9:15 pm    INTUBATION  Risks, benefits and alternatives if able (for applicable procedures below) described. Performed By: ED resident, Dr. Jed Macias DO. Indication:  Respiratory failure. Informed consent:  The patient was counseled regarding the procedure, it's indications, risks, potential complications and alternatives and any questions were answered. Consent was obtained. .  Procedure: Following Preoxygenation the patient was pretreated with etomidate followed by succinylcholine. Intubation was performed after single attempt(s) by direct laryngoscopy using a Glidescope and 8.0mm cuffed endotracheal tube was inserted . Initial post procedure placement:  confirmed by bilateral breath sounds, ETCO2 detection, and absence of sounds over stomach. Tube Secured @ 23cm at the Lip. Post procedure chest x-ray: showed appropriate tube position. Procedural Complications: None. Anesthesia Consult:  No.         Consultations:               Spoke with Dr. Cristiana Hale (internal medicine). Discussed the case. She will admit. Spoke with Dr. Alexandro Hodgkin (intesivist). Discussed the case. He will provide consultation. Critical Care:    Please note that the withdrawal or failure to initiate urgent interventions for this patient would likely result in a life threatening deterioration or permanent disability. Accordingly this patient received 65 minutes of critical care time, excluding separately billable procedures. Counseling: The emergency provider has spoken with the patient and spouse/SO and discussed todays results, in addition to providing specific details for the plan of care and counseling regarding the diagnosis and prognosis. Questions are answered at this time and they are agreeable with the plan. --------------------------------- IMPRESSION AND DISPOSITION ---------------------------------    IMPRESSION  1. Expressive aphasia    2. Altered mental status, unspecified altered mental status type    3. Hypoxemia    4. Acute respiratory failure with hypoxia ([facility]        DISPOSITION  Disposition: Transfer to [facility]  Patient condition is stable    18, 6:31 PM.    This note is prepared by Monserrat Bowling, acting as Scribe for Cristy Zazueta MD.    Cristy Zazueta MD:  The scribe's documentation has been prepared under my direction and personally reviewed by me in its entirety. I confirm that the note above accurately reflects all work, treatment, procedures, and medical decision making performed by me. Radiology Procedure Waiver   Name: Joyce Guzman  : 1955  MRN: [id number]    Date:  18    Time: 6:37 PM    Benefits of immediately proceeding with Radiology exam(s) without pre-testing outweigh the risks or are not indicated as specified below and therefore the following is/are being waived:    [] Pregnancy test   [] Patients LMP on-time and regular.   [] Patient had Tubal Ligation or has other Contraception Device. [] Patient  is Menopausal or Premenarcheal.    [] Patient had Full or Partial Hysterectomy. [] Protocol for Iodine allergy    [] MRI Questionnaire     [x] BUN/Creatinine   [] Patient age w/no hx of renal dysfunction. [] Patient on Dialysis. [] Recent Normal Labs.   Electronically signed by Cristy Zazueta MD on 18 at 6:37 PM               Cristy Zazueta MD  18 [id number]       Cristy Zazueta MD  18 [phone]

## 2018-06-03 PROBLEM — G47.33 OSA (OBSTRUCTIVE SLEEP APNEA): Status: ACTIVE | Noted: 2018-01-01

## 2018-06-03 PROBLEM — G93.41 ACUTE METABOLIC ENCEPHALOPATHY: Status: ACTIVE | Noted: 2018-01-01

## 2018-06-03 PROBLEM — J96.02 ACUTE RESPIRATORY ACIDOSIS (HCC): Status: ACTIVE | Noted: 2018-01-01

## 2018-06-03 PROBLEM — J96.21 ACUTE ON CHRONIC RESPIRATORY FAILURE WITH HYPOXIA AND HYPERCAPNIA (HCC): Status: ACTIVE | Noted: 2018-01-01

## 2018-06-03 PROBLEM — R47.01 APHASIA: Status: ACTIVE | Noted: 2018-01-01

## 2018-06-03 PROBLEM — J96.22 ACUTE ON CHRONIC RESPIRATORY FAILURE WITH HYPOXIA AND HYPERCAPNIA (HCC): Status: ACTIVE | Noted: 2018-01-01

## 2018-06-03 NOTE — PROGRESS NOTES
[phone] nurse to call report to [facility] at [phone].   Mobile ETA at [phone]

## 2018-06-03 NOTE — ED NOTES
Gave report to Chris Hammond RN for room 3908. Updated family on care.      Neil Estrella RN  06/02/18 1410

## 2018-06-06 PROBLEM — E44.0 MODERATE PROTEIN-CALORIE MALNUTRITION (HCC): Status: ACTIVE | Noted: 2018-01-01

## 2018-07-06 NOTE — TELEPHONE ENCOUNTER
[employer] faxed form for new script for Warfarin 2 mg tablet , please order.  Did not see on current med list.

## 2018-07-09 NOTE — TELEPHONE ENCOUNTER
Andra Powers phoned to see if  ordered an INR machine and also a pulse O2 to check his oxygen level at home. Please advise.

## 2018-07-19 NOTE — TELEPHONE ENCOUNTER
I think that he needs his INR checked, but I don't see any other labs that he needs right now. Can you please let me know where the potassium came from? I don't think he changed any medications recently besides stopping Paxil, which we do not need labs to follow-up on.

## 2018-08-20 NOTE — CARE COORDINATION
-Patsy from [facility] [place] returned [facility]'s call. She states that they will be able to provide pt with home PT/INR monitor.  -Updated Crystal that [facility] faxed script and pt's insurance cards to provided number.  -She states that pcp will need to fill out an additional form to process the request. Emmanuel Villarreal states that she will stop at the office to bring forms to pcp to complete. -[facility] will call office to update that Emmanuel Villarreal will be coming with forms. Also, will update Dr. Lynnette Vance regarding above.

## 2018-08-20 NOTE — CARE COORDINATION
-[facility] called and spoke with Hollie Chadwick at [address] to inquire if they had home PT/INR monitors. Hollie Chadwick states that [address] does not carry these monitors. -[facility] called and spoke with Kristen Castro from [facility] to inquire if they carrier home PT/INR monitors. Per Kristen Castro, she states that they do. She requests that [facility] fax script and pt's insurance card information to [phone]. Kristen Castro states that they just started taking pt's insurance type but will run it by her manager first and give [facility] a call back if this will be possible with his insurance type. [facility] faxed requested information to [facility]. Confirmation received.
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
## 2018-08-26 NOTE — ED PROVIDER NOTES
NAD  Head: NC there is abrasion and contusion to left zygoma  Eyes: PERRL, EOMI  Mouth: Oropharynx clear, handling secretions, no trismus  Neck: Supple, full ROM, no meningeal signs No midline tenderness  Pulmonary: Lungs rhonchi and decreased bs to auscultation bilaterally, no wheezes, rales, . Not in respiratory distress  There is left lower lateral rib tenderness no ecchymosis or abrasions noted  Cardiovascular:  Regular rate and rhythm, no murmurs, gallops, or rubs. 2+ distal pulses  Abdomen: Soft, non tender, non distended,   Extremities: Moves all extremities x 4. Abrasion left shoulder with pain on palpation and ROM left wrist there are abrasions over dorsum MCP bilateral and tenderness of all MCP right no deformities noted  Warm and well perfused  Skin: warm and dry without rash  Neurologic: GCS 15,  Psych: Normal Affect      ------------------------------ ED COURSE/MEDICAL DECISION MAKING----------------------  Medications   Tetanus-Diphth-Acell Pertussis (BOOSTRIX) injection 0.5 mL (0.5 mLs Intramuscular Given 8/26/18 1055)         Medical Decision Making:    INR 3.1 CT head neg no headache no blurred vision . Patient does have one rib fracture incentive spirometry 750  Instructed to continue use    Counseling: The emergency provider has spoken with the patient and spouse/SO and discussed todays results, in addition to providing specific details for the plan of care and counseling regarding the diagnosis and prognosis. Questions are answered at this time and they are agreeable with the plan.      --------------------------------- IMPRESSION AND DISPOSITION ---------------------------------    IMPRESSION  1. Closed head injury, initial encounter    2. Abrasion, multiple sites    3. Closed fracture of one rib of left side, initial encounter    4. Sprain and strain of right hand    5. Sprain and strain of left wrist    6.  Current use of long term anticoagulation        DISPOSITION  Disposition: Discharge to home  Patient condition is good                 Keanu Diallo MD  08/26/18 8810
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
## 2018-09-07 NOTE — CARE COORDINATION
-[facility] called and spoke with pt's wife. -Updated her on INR home monitor status. Santiago Crespo voiced understanding.

## 2018-09-13 NOTE — PROGRESS NOTES
Yes Tyson Zhang, APRN - CNP   Multiple Vitamins-Minerals (MULTIVITAL PO) Take 1 tablet by mouth every morning  Yes Historical Provider, MD   OXYGEN Please provide nocturnal oxygen at 2 liters nasal cannula.   Diagnosis-nocturnal hypoxemia  Faxed to [facility]  Patient taking differently: 3 L/min by Nasal route continuous  Yes Bhavik Montgomery MD       Allergies as of 09/13/2018    (No Known Allergies)       Objective:     BP (!) 109/57 (Site: Right Upper Arm, Position: Sitting, Cuff Size: Medium Adult)   Pulse 81   Temp 97 °F (36.1 °C)   Ht 5' 4\" (1.626 m)   Wt 144 lb 14.4 oz (65.7 kg)   SpO2 94%   BMI 24.87 kg/m²      General appearance: alert, appears stated age and cooperative  Head: Normocephalic, without obvious abnormality, atraumatic  Neck: no adenopathy, no carotid bruit and limited ROM  Lungs: diminished on oxygen per NC  Heart: regular rate and rhythm  Extremities: no cyanosis or edema  Pulses: 2+ and symmetric  Skin: no rashes or lesions    Mental Status: Alert, oriented, thought content appropriate    MMSE 26/30    Trouble with recall and spelling     Speech: dysarthric but edentulous   Language: appropriate    Cranial Nerves:  I: smell    II: visual acuity     II: visual fields Full   II: pupils SAUD   III,VII: ptosis None   III,IV,VI: extraocular muscles  EOMI without nystagmus    V: mastication Normal   V: facial light touch sensation  Normal   V,VII: corneal reflex  Present   VII: facial muscle function - upper     VII: facial muscle function - lower Normal   VIII: hearing Normal   IX: soft palate elevation  Normal   IX,X: gag reflex Present   XI: trapezius strength  5/5   XI: sternocleidomastoid strength 5/5   XI: neck extension strength  5/5   XII: tongue strength  Normal     Motor:  5/5 throughout  Normal bulk     No cogwheel rigidity noted    No resting tremor  No tremor with outstretched hands     Sensory:  Normal to LT and PP  Vibration minimally decreased in the ankles

## 2018-09-20 PROBLEM — E44.0 MODERATE PROTEIN-CALORIE MALNUTRITION (HCC): Status: RESOLVED | Noted: 2018-01-01 | Resolved: 2018-01-01

## 2018-09-20 NOTE — PATIENT INSTRUCTIONS
[phone]. [facility] does not give medical advice. The National Vaccine Injury Compensation Program  The National Vaccine Injury Compensation Program (VICP) is a federal program that was created to compensate people who may have been injured by certain vaccines. Persons who believe they may have been injured by a vaccine can learn about the program and about filing a claim by calling 4-694.625.2012 or visiting the [employer] website at www.[facility].gov/vaccinecompensation. There is a time limit to file a claim for compensation. How can I learn more? · Ask your healthcare provider. He or she can give you the vaccine package insert or suggest other sources of information. · Call your local or state health department. · Contact the Centers for Disease Control and Prevention (CDC):  ¨ Call 9-248.220.4886 (1-800-CDC-INFO) or  ¨ Visit CDC's website at www.cdc.gov/flu  Vaccine Information Statement  Inactivated Influenza Vaccine  8/7/2015)  42 TIMOTHY Jaramillo 408RB-82  Department of Health and Human Services  Centers for Disease Control and Prevention  Many Vaccine Information Statements are available in [place]ian and other languages. See www.immunize.org/vis. Muchas hojas de información sobre vacunas están disponibles en español y en otros idiomas. Visite www.immunize.org/vis. Care instructions adapted under license by [facility] ([facility]). If you have questions about a medical condition or this instruction, always ask your healthcare professional. [place] [address] any warranty or liability for your use of this information.

## 2018-09-20 NOTE — PROGRESS NOTES
evening ) 90 tablet 3    tiotropium (SPIRIVA HANDIHALER) 18 MCG inhalation capsule Inhale 1 capsule into the lungs daily (Patient taking differently: Inhale 18 mcg into the lungs every morning ) 90 capsule 3    albuterol sulfate  (90 Base) MCG/ACT inhaler Inhale 2 puffs into the lungs every 6 hours as needed for Wheezing or Shortness of Breath 3 Inhaler 3    budesonide (PULMICORT) 0.5 MG/2ML nebulizer suspension USE 1 VIAL VIA NEBULIZER TWICE DAILY (Patient taking differently: Take 1 ampule by nebulization 2 times daily ) 360 mL 3    formoterol (PERFOROMIST) 20 MCG/2ML nebulizer solution INHALE TWICE DAILY AS DIRECTED (Patient taking differently: Take 20 mcg by nebulization 2 times daily ) 120 mL 5    nitroGLYCERIN (NITROSTAT) 0.4 MG SL tablet Place 1 tablet under the tongue every 5 minutes as needed for Chest pain 25 tablet 3    aspirin 81 MG chewable tablet CHEW AND SWALLOW ONE TABLET EVERY DAY (Patient taking differently: Take 81 mg by mouth every morning ) 90 tablet 3    Blood Pressure Monitoring (ADULT BLOOD PRESSURE CUFF LG) KIT Check blood pressures daily 1 kit 0    Multiple Vitamins-Minerals (MULTIVITAL PO) Take 1 tablet by mouth every morning       OXYGEN Please provide nocturnal oxygen at 2 liters nasal cannula.   Diagnosis-nocturnal hypoxemia  Faxed to [facility] (Patient taking differently: 3 L/min by Nasal route continuous ) 1 Device 0    phytonadione (VITAMIN K) 5 MG tablet Take 1 tablet by mouth once for 1 dose 1 tablet 0     Current Facility-Administered Medications on File Prior to Visit   Medication Dose Route Frequency Provider Last Rate Last Dose    ipratropium (ATROVENT) 0.02 % nebulizer solution 0.5 mg  0.5 mg Nebulization Once Edwige Patel MD        albuterol (PROVENTIL) nebulizer solution 2.5 mg  2.5 mg Nebulization Once Edwige Patel MD           Patient Active Problem List   Diagnosis Code    CAD (coronary artery disease) I25.10    Chronic obstructive pulmonary disease ([facility] J44.9    Hyperlipidemia E78.5    Parkinson's disease ([facility] G20    Anticoagulated on Coumadin Z51.81, Z79.01    PAF (paroxysmal atrial fibrillation) ([facility]) I48.0    Chronic diastolic heart failure ([facility]) I50.32    COPD exacerbation ([facility]) J44.1    S/P ablation of atrial flutter Z98.890, Z86.79    GERD (gastroesophageal reflux disease) K21.9    Hypertension I10    Depression F32.9    Anxiety F41.9    Mitral valve disease I05.9    Hypoxemia in sleep R09.02    Hypothyroidism E03.9    Pure hypercholesterolemia E78.00    Bilateral carotid artery stenosis I65.23    Status post catheter ablation of atrial flutter Z98.890    Essential hypertension I10    Coronary artery disease involving native coronary artery of native heart with angina pectoris with documented spasm ([facility]) I25.111    Benign prostatic hyperplasia with urinary frequency N40.1, R35.0    Acute on chronic respiratory failure with hypoxia and hypercapnia ([facility]) J96.21, J96.22    Stroke of unknown cause ([facility]) I63.9    Aphasia R47.01    Acute metabolic encephalopathy G29.82    Acute respiratory acidosis E87.2    ALVIN (obstructive sleep apnea) G47.33       Assessment / Remus Keto was seen today for copd. Diagnoses and all orders for this visit:    PAF (paroxysmal atrial fibrillation) ([facility]  -     POCT INR  Continue anticoagulation. Continue metoprolol. Symptoms controlled. Chronic obstructive pulmonary disease, unspecified COPD type ([facility])  Severe and near end-stage. Continue pulmonary regimen. Continue to follow with pulmonology. Smoking cessation strongly encouraged. Offered support, if he is willing to take it/when he is willing to take it. He has struggled with this over time. Parkinson's disease ([facility]  Stable. Does not seem to have progressed recently except for possibly memory. He continues Namenda. He is not on Aricept. Continues to follow with neurology.     Moderate protein-calorie malnutrition ([facility]  Problem

## 2018-09-26 NOTE — CARE COORDINATION
-Left message on pt's wife's phone (on HIPPA) to return [facility]'s call.  -Left [facility]'s contact information requesting call back.

## 2018-10-01 NOTE — CARE COORDINATION
Ambulatory Care Coordination Note  10/1/2018  CM Risk Score: 10  Brian Mortality Risk Score:      ACC: Rome Garzon, RN    Summary Note:     -Pt's wife Kellie Cabrera called [facility]. Melisa Toussaint was inquiring about home INR monitor. States that she has not heard anything back about it being shipped. -ACC had left Teresa  last week with contact information on who to call to give the okay to ship monitor. Kellie Rick states that she did not hear that part of the message. -ACC provided Trenton Rick with Pillo's phone number from [facility] where she needs to call and give the okay to ship monitor. -ACC inquired about how pt was from breathing standpoint. She states that pt is at his baseline. SaO2 between 95-96% on 3L. She states that he uses his trilogy at night and as needed. She states that he has his \"good\" days and \"bad\" days. ACC did process the option of palliative care services as an additional support for pt. [facility]ed [facility] does have in-home palliative care that they offer. Kellie Cbarera was very receptive to information on this service and would like more information. Will send pamphlet on palliative care. -ACC inquired about pt's wts. Kellie Cabrera states pt wt has been stable between 144-147 lbs. Today's wt 144lbs. Denies any BLE edema, increased sob, cough, or cp/tightness.  -Confirmed that Teresa received zone management tools for CHF and COPD. Reviewed with Kellie Cabrera. She voiced understanding.   -Kellie Cabrera did not have any further questions/concerns at this time. Care Coordination Interventions    Program Enrollment:  Complex Care  Referral from Primary Care Provider:  No  Suggested Interventions and Community Resources  Zone Management Tools:  Completed (Comment: 8/20/18 COPD and HF zones mailed out)         Goals Addressed             Most Recent     Conditions and Symptoms   On track (10/1/2018)             I will schedule office visits, as directed by my provider.   I will keep my appointment or reschedule if I have to

## 2018-10-09 NOTE — CARE COORDINATION
tamsulosin (FLOMAX) 0.4 MG capsule TAKE 1 CAPSULE BY MOUTH DAILY FOR ENLARGED PROSTATE. 3/29/18   Alina Love MD   metoprolol succinate (TOPROL XL) 100 MG extended release tablet Take 1 tablet by mouth 2 times daily 1/15/18   Alian Love MD   furosemide (LASIX) 40 MG tablet TAKE 1 TABLET BY MOUTH DAILY  Patient taking differently: Take 40 mg by mouth every morning  1/15/18   Alina Love MD   atorvastatin (LIPITOR) 80 MG tablet TAKE 1 TABLET BY MOUTH DAILY  Patient taking differently: Take 80 mg by mouth every evening  1/15/18   Alina Love MD   tiotropium (Lilly Anyi) 18 MCG inhalation capsule Inhale 1 capsule into the lungs daily  Patient taking differently: Inhale 18 mcg into the lungs every morning  1/15/18   Alina Love MD   albuterol sulfate  (90 Base) MCG/ACT inhaler Inhale 2 puffs into the lungs every 6 hours as needed for Wheezing or Shortness of Breath 1/15/18   Alina Love MD   budesonide (PULMICORT) 0.5 MG/2ML nebulizer suspension USE 1 VIAL VIA NEBULIZER TWICE DAILY  Patient taking differently: Take 1 ampule by nebulization 2 times daily  1/15/18   Alina Love MD   formoterol (PERFOROMIST) 20 MCG/2ML nebulizer solution INHALE TWICE DAILY AS DIRECTED  Patient taking differently: Take 20 mcg by nebulization 2 times daily  12/14/17   Alina Love MD   nitroGLYCERIN (NITROSTAT) 0.4 MG SL tablet Place 1 tablet under the tongue every 5 minutes as needed for Chest pain 10/4/17   Nella Payne MD   aspirin 81 MG chewable tablet CHEW AND SWALLOW ONE TABLET EVERY DAY  Patient taking differently: Take 81 mg by mouth every morning  4/13/17   Alina Love MD   Blood Pressure Monitoring (ADULT BLOOD PRESSURE CUFF LG) KIT Check blood pressures daily 6/29/16   Avila Geller, APRN - CNP   Multiple Vitamins-Minerals (MULTIVITAL PO) Take 1 tablet by mouth every morning     Historical Provider, MD   OXYGEN Please provide nocturnal oxygen at 2 liters

## 2018-11-07 PROBLEM — R40.0 SOMNOLENCE: Status: ACTIVE | Noted: 2018-01-01

## 2018-11-07 PROBLEM — J96.02 ACUTE RESPIRATORY FAILURE WITH HYPERCAPNIA (HCC): Status: ACTIVE | Noted: 2018-01-01

## 2018-11-07 PROBLEM — J96.92 RESPIRATORY FAILURE WITH HYPERCAPNIA (HCC): Status: ACTIVE | Noted: 2018-01-01

## 2018-11-07 PROBLEM — H57.03: Status: ACTIVE | Noted: 2018-01-01

## 2018-11-07 NOTE — ED PROVIDER NOTES
Patient is a 57yo male who presents to the ED c/o chest pain. He states it started suddenly 2-3 hours ago and has been unchanged. He does complain of associated shortness of breath which he states he has at baseline but it acutely worsened today. Patient has a history of COPD but admits to continued smoking. He is on a baby aspirin every day at home. He has nitro but did not take them. Nothing seemed to make his symptoms any better or worse at home. His pulmonologist is Dr. Jaky Ramos. The history is provided by the patient. Review of Systems   Constitutional: Negative for chills and fever. HENT: Negative for ear pain, sinus pressure and sore throat. Eyes: Negative for pain, discharge and redness. Respiratory: Positive for cough and shortness of breath. Cardiovascular: Positive for chest pain. Gastrointestinal: Negative for abdominal pain, diarrhea, nausea and vomiting. Genitourinary: Negative for dysuria and frequency. Musculoskeletal: Negative for arthralgias and back pain. Skin: Negative for rash and wound. Neurological: Negative for weakness and headaches. Hematological: Negative for adenopathy. All other systems reviewed and are negative. Physical Exam   Constitutional: He is oriented to person, place, and time. He appears well-developed. He appears cachectic. He appears ill. HENT:   Head: Normocephalic and atraumatic. Eyes: Pupils are equal, round, and reactive to light. Neck: Normal range of motion. Neck supple. Cardiovascular: Regular rhythm and normal heart sounds. Tachycardia present. No murmur heard. Pulmonary/Chest: Accessory muscle usage present. Tachypnea noted. He is in respiratory distress. He has decreased breath sounds. He has wheezes. He has no rales. Abdominal: Soft. Bowel sounds are normal. There is no tenderness. There is no rebound and no guarding. Musculoskeletal: He exhibits no edema.    Neurological: He is alert and oriented to person,

## 2018-11-07 NOTE — ED NOTES
[facility] faxed to floor spoke with Odella Severin, copy of fax verificarion received and in chart, pt. Is chimed.      Myranda Mascorro RN  11/07/18 4041

## 2018-11-07 NOTE — CONSULTS
urgency or hematuria  Musculoskeletal: Denies myalgias, muscle weakness, and bone pain  Neurological: Denies dizziness, vertigo, headache, and focal weakness  Psychological: Denies anxiety and depression  Endocrine: Denies heat intolerance and cold intolerance      PHYSICAL EXAM:    Vitals:  BP (!) 124/59   Pulse 73   Temp 98.9 °F (37.2 °C) (Oral)   Resp 16   Ht 5' 4\" (1.626 m)   Wt 153 lb 8 oz (69.6 kg)   SpO2 95%   BMI 26.35 kg/m²     General:  Awake, alert, oriented X 3. No apparent distress. HEENT:  Normocephalic, atraumatic. Pupils equal, round, reactive to light. No scleral icterus. No conjunctival injection. Normal lips, teeth, and gums. No nasal discharge.   Neck:  Supple; no bruits  Heart:  RRR, no murmurs, gallops, rubs  Lungs:  Patient has diffuse bilateral wheezing and rhonchi Abdomen:  BS+, soft, nontender nondistended, no masses, no organomegaly Extremities:  No clubbing, cyanosis, or edema  Skin:  Warm and dry, no open lesions or rash  Neuro:  Cranial nerves 2-12 intact, no focal deficits    LABS:  Recent Results (from the past 24 hour(s))   EKG 12 Lead    Collection Time: 11/06/18  8:56 PM   Result Value Ref Range    Ventricular Rate 101 BPM    Atrial Rate 101 BPM    P-R Interval 152 ms    QRS Duration 84 ms    Q-T Interval 332 ms    QTc Calculation (Bazett) 430 ms    P Axis 80 degrees    R Axis 69 degrees    T Axis 67 degrees   Blood Gas, Arterial    Collection Time: 11/06/18  9:05 PM   Result Value Ref Range    Date Analyzed [id number]     Time Analyzed 2114     Source: Blood Arterial     pH, Blood Gas 7.210 (LL) 7.350 - 7.450    PCO2 84.5 (HH) 35.0 - 45.0 mmHg    PO2 87.9 60.0 - 100.0 mmHg    HCO3 33.0 (H) 22.0 - 26.0 mmol/L    B.E. 2.4 -3.0 - 3.0 mmol/L    O2 Sat 95.6 92.0 - 98.5 %    O2Hb 92.5 (L) 94.0 - 97.0 %    COHb 3.2 (H) 0.0 - 1.5 %    MetHb 0.0 0.0 - 1.5 %    O2 Content 18.0 mL/dL    HHb 4.3 0.0 - 5.0 %    tHb (est) 13.8 11.5 - 16.5 g/dL    Mode NC- 3 L     Date Of Collection

## 2018-11-08 PROBLEM — J96.02 ACUTE RESPIRATORY FAILURE WITH HYPERCAPNIA (HCC): Status: RESOLVED | Noted: 2018-01-01 | Resolved: 2018-01-01

## 2018-11-08 PROBLEM — J96.92 RESPIRATORY FAILURE WITH HYPERCAPNIA (HCC): Status: RESOLVED | Noted: 2018-01-01 | Resolved: 2018-01-01

## 2018-11-08 PROBLEM — H57.03: Status: RESOLVED | Noted: 2018-01-01 | Resolved: 2018-01-01

## 2018-11-08 PROBLEM — R40.0 SOMNOLENCE: Status: RESOLVED | Noted: 2018-01-01 | Resolved: 2018-01-01

## 2018-11-08 NOTE — PROGRESS NOTES
Patient is complaining of chest pain, vitals are stable and EKG was unchanged from this mornings EKG. Bipap was reapplied. Patient began complaining of chest pain again 15 minutes later and states he thinks the bipap is causing his pain. Patient stated he would like to leave the bipap off and the risks and benefits were explained of not wearing the bipap. 3LO2 applied to patient and will attempt to reapply the bipap later.

## 2018-11-08 NOTE — PROGRESS NOTES
[facility] 450  Progress Note    Subjective:    Patient was awake, alert, and sitting up in bed on my examination. Reports he feels greatly improved from his time of admission and states he is currently at his baseline. He reports to not using his bipap over night because it was uncomfortable. Denies chest pain or SOB. Past medical, surgical, family and social history were reviewed, non-contributory, and unchanged unless otherwise stated. Objective:    /73   Pulse 78   Temp 98 °F (36.7 °C) (Oral)   Resp 16   Ht 5' 4\" (1.626 m)   Wt 152 lb (68.9 kg)   SpO2 96%   BMI 26.09 kg/m²     General: Awake, Alert and oriented x3, sitting up in bed  Heart:  RRR, no murmurs, gallops, or rubs. Lungs:  Mild diffuse wheezing, rales or rhonchi. On 3L nasal cannula  Abd: nontender, nondistended, no masses  Extrem:  No clubbing, cyanosis, or edema    Recent Results (from the past 12 hour(s))   EKG 12 Lead    Collection Time: 11/07/18 10:59 PM   Result Value Ref Range    Ventricular Rate 79 BPM    Atrial Rate 79 BPM    P-R Interval 144 ms    QRS Duration 86 ms    Q-T Interval 366 ms    QTc Calculation (Bazett) 419 ms    P Axis 63 degrees    R Axis 55 degrees    T Axis 58 degrees     XR CHEST STANDARD (2 VW)   Final Result   1. No acute pleuroparenchymal disease. 2. Linear atelectasis of the left lower lung. 3. Status post prior CABG. CT Head WO Contrast   Final Result   No acute intracranial hemorrhage. Age-related volume loss and minimal small    vessel ischemic disease. Old right lacunar infarct. ASPECT [facility] Stroke Program Early CT Score) = 10. This report has been electronically signed by Kevan Wayne MD.      XR CHEST PORTABLE   Final Result   No acute cardiopulmonary process.           Assessment:    Active Hospital Problems    Diagnosis Date Noted    Chronic obstructive pulmonary disease ([facility] [address] [J44.9] 10/20/2011     Priority: Medium   [employer]

## 2018-11-08 NOTE — PROGRESS NOTES
Siobhan Batista M.D.,[facility]  Linda Small D.O., F.A.C.OMCKAY., Jose Noble M.D. Marylou Pringle M.D., Alonso Delgado M.D. Daily Pulmonary Progress Note    Patient:  Pako Hampton 58 y.o. male MRN: [id number]     Date of Service: 11/8/2018      Synopsis     We are following patient for severe COPD, chronic hypoxic respiratory failure on chronic oxygen    \"CC\" shortness of breath    Code status:  Subjective      Patient was seen and examined today. He is in no acute distress. He does complain of a productive cough with white sputum. He in on 3L oxygen which is baseline for him. He denies dyspnea. Review of Systems:  Constitutional: Denies fever, weight loss, night sweats, and fatigue  Skin: Denies pigmentation, dark lesions, and rashes   HEENT: Denies hearing loss, tinnitus, ear drainage, epistaxis, sore throat, and hoarseness. Cardiovascular: Denies palpitations, chest pain, and chest pressure. Respiratory: Denies cough, dyspnea at rest, hemoptysis, apnea, and choking. Gastrointestinal: Denies nausea, vomiting, poor appetite, diarrhea, heartburn or reflux  Genitourinary: Denies dysuria, frequency, urgency or hematuria  Musculoskeletal: Denies myalgias, muscle weakness, and bone pain  Neurological: Denies dizziness, vertigo, headache, and focal weakness  Psychological: Denies anxiety and depression  Endocrine: Denies heat intolerance and cold intolerance  Hematopoietic/Lymphatic: Denies bleeding problems and blood transfusions    24-hour events:  No acute overnight events    Objective   Vitals: BP (!) 133/59   Pulse 76   Temp 97.4 °F (36.3 °C) (Oral)   Resp 16   Ht 5' 4\" (1.626 m)   Wt 152 lb (68.9 kg)   SpO2 99%   BMI 26.09 kg/m²     I/O:  No intake or output data in the 24 hours ending 11/08/18 0937    Vent Information  FiO2 : (S) 30 %       IPAP: 15 cmH20  EPAP: 5 cmH2O     Physical Exam:  General Appearance: appears comfortable in no acute distress.    HEENT:

## 2018-11-08 NOTE — PROGRESS NOTES
Called respiratory to draw daily ABG's on [place]any [place], they responded that they wanted to look further into the order. They do not believe that they should be drawn and the ordering doctor is not a pulmonologist. Spoke to Dr. Jeremiah Varela and she reviewed the order and said they can be discontinued but to call respiratory for a breathing treatment.

## 2018-11-08 NOTE — DISCHARGE SUMMARY
Physician Discharge Summary         Patient ID:  Amina Alexander  [id number]  29 y.o.  1955    Admit date: 11/6/2018    Discharge date and time:  11/8/2018 at 12:30 pm    Admission Diagnoses:   Patient Active Problem List   Diagnosis    CAD (coronary artery disease)    Chronic obstructive pulmonary disease ([facility] [address]    Hyperlipidemia    Parkinson's disease ([facility] [address]    Anticoagulated on Coumadin    [facility]oxysmal [facility] fibrillation) ([facility])    Chronic diastolic heart failure (HCC)    COPD exacerbation ([facility] [address]    S/P ablation of atrial flutter    GERD (gastroesophageal reflux disease)    Hypertension    Depression    Anxiety    Mitral valve disease    Hypoxemia in sleep    Hypothyroidism    Pure hypercholesterolemia    Bilateral carotid artery stenosis    Status post catheter ablation of atrial flutter    Essential hypertension    Coronary artery disease involving native coronary artery of native heart with angina pectoris with documented spasm [facility]    Benign prostatic hyperplasia with urinary frequency    Acute on chronic respiratory failure with hypoxia and hypercapnia (HCC)    Stroke of unknown cause ([facility])    Aphasia    Acute metabolic encephalopathy    Acute respiratory acidosis    ALVIN (obstructive sleep apnea)       Discharge Diagnoses: COPD exacerbation     Consults: pulmonary/intensive care    Procedures: none    Hospital Course: Mr. Florencia Mathews presented to the [facility] ED on 11/06/2018 with a chief complaint of Shortness of Breath. Patients wife reported that when she got home from work the patient was complaining of being more short of breath than usual. He stated that he had been using the trilogy (portable), and was not getting better. He asked her to bring him to the hospital. In the ER the patients initial ABG showed: pH: of 7.210, PCO2: 84.5, HCO3: 33. The patient was placed on a BiPAP and his SOB breath improved.  The patient was somewhat drowsy initially and then become more somnolent (did receive Narcan x2 in the ER which did not help) even though his ABG continued to improve. A reevaluation was done by the ER Physician, a CT Scan of his head was ordered. Which was negative. He was admitted to telemetry with continuous BIPAP, he was allowed to eat after a swallowing test was negative. He was given 125 mg of solumedrol in the ER which was continued as 80 mg daily for 2 more days until his discharge. He was also started on Zithromax for inflammation, Performist, Budesonide and Bebo, Pulmonology who were also following the patient. Over the 2+ days stay at the hospital patients clinical status improved, leukocytosis resolved, and ABGs continued to improve. Towards the end pt refused BIPAP due to chest discomfort. He was discharged from the hospital in a stable medical condition with a tapering dose of prednisone for 10 days and 3 days and Zithromax. Also, pt takes Warfarin for Chronic A. Fib - Takes 8 mg everyday except 7 mg on Wednesday. On arrival his INR was 1.7 - he was given 9 mg on Wednesday 11/7/2018 and his INR before discharge was 2.2. He was advised to continue his current home regimen for warfarin upon discharge.      Discharge Exam:  See progress note from today    Disposition: home    Patient Instructions:   Current Discharge Medication List      START taking these medications    Details   azithromycin (ZITHROMAX) 250 MG tablet Take 1 tablet by mouth daily for 3 days  Qty: 3 tablet, Refills: 0      predniSONE (DELTASONE) 10 MG tablet Day 1 (11/9/18) 60mg - Day 3 take 60mg/day, Day 4-6 take 40mg/day, Day 6-9 take 20mg/day, Day 10 take 10mg and then stop  Qty: 37 tablet, Refills: 0         CONTINUE these medications which have NOT CHANGED    Details   warfarin (COUMADIN) 4 MG tablet Take 8 mg by mouth daily 8 mg Sun, Mon, Tues, Thurs, Fri, Sat. 7mg on Wed      furosemide (LASIX) 40 MG tablet TAKE 1 TABLET EVERY DAY  Qty: 90 tablet, Refills: 3    Associated Diagnoses: Atrial

## 2018-11-15 NOTE — PROGRESS NOTES
tiotropium (SPIRIVA HANDIHALER) 18 MCG inhalation capsule  Inhale 1 capsule into the lungs daily             topiramate (TOPAMAX) 50 MG tablet  Take 1 tablet by mouth 2 times daily             warfarin (COUMADIN) 4 MG tablet  Take 8 mg by mouth daily 8 mg Sun, Mon, Tues, Thurs, Fri, Sat. 7mg on Wed                   Medications marked \"taking\" at this time  Outpatient Prescriptions Marked as Taking for the 11/15/18 encounter (Office Visit) with Jaymie Van MD   Medication Sig Dispense Refill    predniSONE (DELTASONE) 10 MG tablet Day 1 (11/9/18) 60mg - Day 3 take 60mg/day, Day 4-6 take 40mg/day, Day 6-9 take 20mg/day, Day 10 take 10mg and then stop 37 tablet 0    warfarin (COUMADIN) 4 MG tablet Take 8 mg by mouth daily 8 mg Sun, Mon, Tues, Thurs, Fri, Sat. 7mg on Wed      furosemide (LASIX) 40 MG tablet TAKE 1 TABLET EVERY DAY 90 tablet 3    budesonide (PULMICORT) 0.5 MG/2ML nebulizer suspension INHALE THE CONTENTS OF 1 VIAL VIA NEBULIZER TWICE DAILY 360 mL 3    topiramate (TOPAMAX) 50 MG tablet Take 1 tablet by mouth 2 times daily 180 tablet 1    memantine (NAMENDA) 10 MG tablet Take 1 tablet by mouth 2 times daily 180 tablet 2    acetaminophen (TYLENOL) 500 MG tablet Take 2 tablets by mouth every 8 hours as needed for Pain 50 tablet 0    ipratropium-albuterol (DUONEB) 0.5-2.5 (3) MG/3ML SOLN nebulizer solution INHALE THE CONTENTS OF 1 VIAL INTO THE LUNGS VIA NEBULIZER EVERY 6 HOURS AS NEEDED FOR SHORTNESS OF BREATH 1080 mL 1    omeprazole (PRILOSEC) 40 MG delayed release capsule TAKE 1 CAPSULE  DAILY FOR STOMACH OR REFLUX 90 capsule 1    pramipexole (MIRAPEX) 1.5 MG tablet TAKE 1 TABLET THREE TIMES DAILY 270 tablet 2    tamsulosin (FLOMAX) 0.4 MG capsule TAKE 1 CAPSULE BY MOUTH DAILY FOR ENLARGED PROSTATE.  90 capsule 2    metoprolol succinate (TOPROL XL) 100 MG extended release tablet Take 1 tablet by mouth 2 times daily 180 tablet 3    atorvastatin (LIPITOR) 80 MG tablet TAKE 1 TABLET BY

## 2018-11-30 NOTE — CARE COORDINATION
Message left on patient's wife's phone asking how pt is doing, if he has any needs or questions and asked that she call back at their earliest convenience with contact information given.

## 2018-11-30 NOTE — PROGRESS NOTES
Left message for Bertha Sanchez- she is to return our call to confirm she got message and xavier takes 8 mg daily.

## 2018-12-11 PROBLEM — J16.0 COMMUNITY ACQUIRED PNEUMONIA DUE TO CHLAMYDIA SPECIES: Status: ACTIVE | Noted: 2018-01-01

## 2018-12-11 NOTE — H&P
Procedure Laterality Date    ABLATION OF DYSRHYTHMIC FOCUS  10-7-2015    Dr. Galeas Huge  2/3/2010    [facility] and double bypass    COLONOSCOPY  4/6/16    COLONOSCOPY  05/02/2017    CORONARY ARTERY BYPASS GRAFT  2/2010    ENDOSCOPY, COLON, DIAGNOSTIC      EYE SURGERY      [facility] cataracts [facility], 3/2017     INGUINAL HERNIA REPAIR  1970's    left    MITRAL VALVE REPLACEMENT      RECTAL SURGERY  9/2015    for rectal prolaspe    UPPER GASTROINTESTINAL ENDOSCOPY  06/01/2016    UPPER GASTROINTESTINAL ENDOSCOPY  04/24/2017       Medications Prior to Admission:    Not in a hospital admission. Allergies:    Patient has no known allergies. Social History:    reports that he has been smoking Cigarettes. He started smoking about 48 years ago. He has a 88.00 pack-year smoking history. He has never used smokeless tobacco. He reports that he does not drink alcohol or use drugs. Family History:   family history includes Alzheimer's Disease (age of onset: 47) in his brother; Emphysema in his father; Other in his sister; Stroke in his mother. REVIEW OF SYSTEMS:  As above in the HPI, otherwise negative    PHYSICAL EXAM:    Vitals:  BP (!) 107/49   Pulse 66   Temp 97.6 °F (36.4 °C) (Oral)   Resp 16   Ht 5' 4\" (1.626 m)   Wt 168 lb (76.2 kg)   SpO2 100%   BMI 28.84 kg/m²     Physical Exam   Constitutional: He is oriented to person, place, and time. He appears well-developed and well-nourished. HENT:   Head: Normocephalic and atraumatic. Eyes: Pupils are equal, round, and reactive to light. Neck: Normal range of motion. Neck supple. Cardiovascular: Normal rate, regular rhythm and normal heart sounds. Pulmonary/Chest: Effort normal and breath sounds normal. No respiratory distress. He has no wheezes. He has no rales. Abdominal: Soft. Bowel sounds are normal. There is no tenderness. There is no rebound and no guarding. 0. 71 0.10 - 0.95 E9/L    Eosinophils # 0.03 (L) 0.05 - 0.50 E9/L    Basophils # 0.03 0.00 - 0.20 E9/L   Urinalysis    Collection Time: 12/11/18 10:15 AM   Result Value Ref Range    Color, UA Yellow Straw/Yellow    Clarity, UA Clear Clear    Glucose, Ur Negative Negative mg/dL    Bilirubin Urine Negative Negative    Ketones, Urine Negative Negative mg/dL    Specific Gravity, UA <=1.005 1.005 - 1.030    Blood, Urine Negative Negative    pH, UA 6.5 5.0 - 9.0    Protein, UA Negative Negative mg/dL    Urobilinogen, Urine 0.2 <2.0 E.U./dL    Nitrite, Urine Negative Negative    Leukocyte Esterase, Urine Negative Negative         ASSESSMENT:      Active Hospital Problems    Diagnosis Date Noted    Community acquired pneumonia due to Chlamydia species [J16.0] 12/11/2018       ASSESSMENT AND PLAN:     NEURO:  AAO*3   No acute neurological     RESPIRATORY:  CT evidence of left lower lobe pneumonia  -Pt is currently stable  -Vitals as per unit  -Up with assistance  -No known allergies  -Patient can have regular diet  -Patient to have ins and outs recorded  -Patient started on Rocephin 2 g every day, as well as doxycycline every 12 hours  -Obtain respiratory film array as well as strep antigen in the urine as well as Legionella antigen in the urine    History of COPD  -Home medications continued    CARDIOVASCULAR:  Hx of Afib   -Currently anticoagulated with warfarin will continue home dose with close monitoring and INRs daily, to make sure that it does not climb as most of the antibiotics that we're using will be interacting with that. GASTROINTESTINAL:  Intractable diarrhea  -Diarrhea is significantly watery.  -Possibility of infectious cause of diarrhea  -Brief discussion with pharmacist reveals that new guidelines suggest to not treat empirically and that Flagyl is no longer the drug of choice for the treatment of C.  Diff.  -Obtain stool cultures, fecal leukocytes, Giardia antigens, Ova & parasites    RENAL:  BUN/Cr

## 2018-12-11 NOTE — ED PROVIDER NOTES
(2/3/2010); Colonoscopy (4/6/16); Colonoscopy (05/02/2017); Upper gastrointestinal endoscopy (06/01/2016); and Upper gastrointestinal endoscopy (04/24/2017). Social History:  reports that he quit smoking about 6 months ago. His smoking use included Cigarettes. He started smoking about 48 years ago. He has a 88.00 pack-year smoking history. He has never used smokeless tobacco. He reports that he does not drink alcohol or use drugs. Family History: family history includes Alzheimer's Disease (age of onset: 47) in his brother; Emphysema in his father; Other in his sister; Stroke in his mother. The patients home medications have been reviewed. Allergies: Patient has no known allergies.     -------------------------------------------------- RESULTS -------------------------------------------------  All laboratory and radiology results have been personally reviewed by myself   LABS:  Results for orders placed or performed during the hospital encounter of 12/11/18   CBC Auto Differential   Result Value Ref Range    WBC 13.9 (H) 4.5 - 11.5 E9/L    RBC 3.98 3.80 - 5.80 E12/L    Hemoglobin 11.8 (L) 12.5 - 16.5 g/dL    Hematocrit 38.8 37.0 - 54.0 %    MCV 97.5 80.0 - 99.9 fL    MCH 29.6 26.0 - 35.0 pg    MCHC 30.4 (L) 32.0 - 34.5 %    RDW 15.5 (H) 11.5 - 15.0 fL    Platelets 778 462 - 407 E9/L    MPV 10.7 7.0 - 12.0 fL    Neutrophils % 87.3 (H) 43.0 - 80.0 %    Immature Granulocytes % 0.4 0.0 - 5.0 %    Lymphocytes % 6.8 (L) 20.0 - 42.0 %    Monocytes % 5.1 2.0 - 12.0 %    Eosinophils % 0.2 0.0 - 6.0 %    Basophils % 0.2 0.0 - 2.0 %    Neutrophils # 12.13 (H) 1.80 - 7.30 E9/L    Immature Granulocytes # 0.05 E9/L    Lymphocytes # 0.94 (L) 1.50 - 4.00 E9/L    Monocytes # 0.71 0.10 - 0.95 E9/L    Eosinophils # 0.03 (L) 0.05 - 0.50 E9/L    Basophils # 0.03 0.00 - 0.20 U1/A   Basic Metabolic Panel   Result Value Ref Range    Sodium 143 132 - 146 mmol/L    Potassium 4.1 3.5 - 5.0 mmol/L    Chloride 99 98 - 107 mmol/L none

## 2018-12-12 PROBLEM — J18.9 COMMUNITY ACQUIRED PNEUMONIA OF LEFT LOWER LOBE OF LUNG: Status: ACTIVE | Noted: 2018-01-01

## 2018-12-12 PROBLEM — D64.9 ANEMIA: Status: ACTIVE | Noted: 2018-01-01

## 2018-12-12 NOTE — PROGRESS NOTES
Notified Dr. Anali Smart via [email or web address] of patient's request for CPAP-pt has at home but unsure of settings; Dr. Hedy Harp covering

## 2018-12-12 NOTE — CARE COORDINATION
EVERY MORNING 11/29/18   Ольга Gama MD   atorvastatin (LIPITOR) 80 MG tablet TAKE 1 TABLET EVERY DAY  Patient taking differently: TAKE 1 TABLET BY MOUTH NIGHTLY 11/29/18   Ольга Gama MD   warfarin (COUMADIN) 4 MG tablet Take 8 mg by mouth every evening     Arabella Dupont MD   furosemide (LASIX) 40 MG tablet TAKE 1 TABLET EVERY DAY  Patient taking differently: TAKE 1 TABLET BY MOUTH EVERY MORNING 10/18/18   Ольга Gama MD   budesonide (PULMICORT) 0.5 MG/2ML nebulizer suspension INHALE THE CONTENTS OF 1 VIAL VIA NEBULIZER TWICE DAILY 10/18/18   Ольга Gama MD   topiramate (TOPAMAX) 50 MG tablet Take 1 tablet by mouth 2 times daily 9/17/18   MARIKA Ruiz  CNS   memantine (NAMENDA) 10 MG tablet Take 1 tablet by mouth 2 times daily 9/17/18   MARIKA Ruiz - CNS   acetaminophen (TYLENOL) 500 MG tablet Take 2 tablets by mouth every 8 hours as needed for Pain 8/26/18   Alex Conde MD   ipratropium-albuterol (DUONEB) 0.5-2.5 (3) MG/3ML SOLN nebulizer solution INHALE THE CONTENTS OF 1 VIAL INTO THE LUNGS VIA NEBULIZER EVERY 6 HOURS AS NEEDED FOR SHORTNESS OF BREATH  Patient taking differently: INHALE THE CONTENTS OF 1 VIAL INTO THE LUNGS VIA NEBULIZER EVERY 6 HOURS 7/2/18   Ольга Gama MD   pramipexole (MIRAPEX) 1.5 MG tablet TAKE 1 TABLET THREE TIMES DAILY  Patient taking differently: TAKE 1 TABLET BY MOUTH THREE TIMES DAILY 6/27/18   MARIKA Redman - CNP   tamsulosin (FLOMAX) 0.4 MG capsule TAKE 1 CAPSULE BY MOUTH DAILY FOR ENLARGED PROSTATE. Patient taking differently: Take 0.4 mg by mouth every morning For enlarged prostate.  3/29/18   Ольга Gama MD   albuterol sulfate  (90 Base) MCG/ACT inhaler Inhale 2 puffs into the lungs every 6 hours as needed for Wheezing or Shortness of Breath 1/15/18   Ольга Gama MD   formoterol (PERFOROMIST) 20 MCG/2ML nebulizer solution INHALE TWICE DAILY AS DIRECTED  Patient taking differently: Take

## 2018-12-13 NOTE — PROGRESS NOTES
[facility]  Progress Note    Subjective:  Mr. Ree Talamantes reports that he thinks his breathing is better. He states that he is no longer having diarrhea, and instead, is having formed BMs. No other concerns this morning. Past medical, surgical, family and social history were reviewed, non-contributory, and unchanged unless otherwise stated. Objective:    /63   Pulse 82   Temp 98 °F (36.7 °C) (Oral)   Resp 24   Ht 5' 4\" (1.626 m)   Wt 167 lb (75.8 kg)   SpO2 94%   BMI 28.67 kg/m²     General: In no acute distress. On 3 L O2. Heart:  RRR. No murmurs, gallops, or rubs. Lungs: Diminished air movement in all lung fields. No wheezing, rhonci, or crackles. Abd: Bowel sounds present, nontender, nondistended, no masses. Extrem:  2+ bilateral pitting edema of lower extremities.      Recent Results (from the past 24 hour(s))   CBC auto differential    Collection Time: 12/13/18  2:51 AM   Result Value Ref Range    WBC 11.3 4.5 - 11.5 E9/L    RBC 3.75 (L) 3.80 - 5.80 E12/L    Hemoglobin 11.0 (L) 12.5 - 16.5 g/dL    Hematocrit 36.9 (L) 37.0 - 54.0 %    MCV 98.4 80.0 - 99.9 fL    MCH 29.3 26.0 - 35.0 pg    MCHC 29.8 (L) 32.0 - 34.5 %    RDW 15.8 (H) 11.5 - 15.0 fL    Platelets 342 319 - 232 E9/L    MPV 10.1 7.0 - 12.0 fL    Neutrophils % 73.1 43.0 - 80.0 %    Immature Granulocytes % 0.4 0.0 - 5.0 %    Lymphocytes % 14.0 (L) 20.0 - 42.0 %    Monocytes % 10.5 2.0 - 12.0 %    Eosinophils % 1.6 0.0 - 6.0 %    Basophils % 0.4 0.0 - 2.0 %    Neutrophils # 8.27 (H) 1.80 - 7.30 E9/L    Immature Granulocytes # 0.04 E9/L    Lymphocytes # 1.58 1.50 - 4.00 E9/L    Monocytes # 1.18 (H) 0.10 - 0.95 E9/L    Eosinophils # 0.18 0.05 - 0.50 E9/L    Basophils # 0.04 0.00 - 0.20 E9/L   Comprehensive Metabolic Panel w/ Reflex to MG    Collection Time: 12/13/18  2:51 AM   Result Value Ref Range    Sodium 142 132 - 146 mmol/L    Potassium reflex Magnesium 3.9 3.5 - 5.0 mmol/L    Chloride 105 98 - 107 mmol/L    CO2 32 (H) 22 - 29 mmol/L    Anion Gap 5 (L) 7 - 16 mmol/L    Glucose 103 (H) 74 - 99 mg/dL    BUN 18 8 - 23 mg/dL    CREATININE 0.8 0.7 - 1.2 mg/dL    GFR Non-African American >60 >=60 mL/min/1.73    GFR African American >60     Calcium 8.6 8.6 - 10.2 mg/dL    Total Protein 6.2 (L) 6.4 - 8.3 g/dL    Alb 3.5 3.5 - 5.2 g/dL    Total Bilirubin 0.4 0.0 - 1.2 mg/dL    Alkaline Phosphatase 92 40 - 129 U/L    ALT 23 0 - 40 U/L    AST 18 0 - 39 U/L   Protime-INR    Collection Time: 12/13/18  2:51 AM   Result Value Ref Range    Protime 28.5 (H) 9.3 - 12.4 sec    INR 2.5        CT ABDOMEN PELVIS W IV CONTRAST Additional Contrast? None   Final Result   A small ventral hernia in the epigastric region with herniation of fat   and mild inflammatory changes without bowel herniation. There is   slightly dilated small bowel loops likely ileus. Uncomplicated diverticulosis without diverticulitis. Patchy infiltrates in the left lung base concerning for pneumonia with    subpleural nodular densities likely scarring. Surveillance is   recommended with  repeat study in 3-4 months to see stability. XR CHEST STANDARD (2 VW)   Final Result      Heavy markings bilaterally but no obvious  intrathoracic active   disease. XR CHEST STANDARD (2 VW)    (Results Pending)       Assessment:    Active Hospital Problems    Diagnosis Date Noted    Anticoagulated on Coumadin [Z51.81, Z79.01] 08/01/2014     Priority: Medium    PAF (paroxysmal atrial fibrillation) ([facility] [address] [I48.0] 02/06/2010     Priority: Medium    Anemia [D64.9] 12/12/2018    Diarrhea [R19.7]     Community acquired pneumonia of left lower lobe of lung ([facility] [address] [J18.1] 12/11/2018       ASSESSMENT AND PLAN:     1. Left lower lobe pneumonia in patient with hx of COPD   - Patient clinically improving on Rocephin (Day 3) and doxycycline (Day 3).   - WBC count today 11.3 and WNL   - Patient remains afebrile   - On DUONEBS, formoterol,

## 2018-12-13 NOTE — PROGRESS NOTES
Message sent to Dr. Mando Mittal regarding chest xray results, ambulating Spo2 results 92% on 3 L and need for coumadin order for today.

## 2018-12-13 NOTE — PROGRESS NOTES
Date: 12/13/2018    Time: 12:39 AM    Patient Placed On BIPAP/CPAP/ Non-Invasive Ventilation? No    If no must comment. Facial area red/color change? No           If YES are Blister/Lesion present? No   If yes must notify nursing staff  BIPAP/CPAP skin barrier?   Yes    Skin barrier type:Liquicel       Comments: remains on        [facility]

## 2018-12-13 NOTE — DISCHARGE SUMMARY
for GI bleeding with his PCP. Discharge Exam:  General: In no acute distress. On 3 L O2. Heart:  RRR. No murmurs, gallops, or rubs. Lungs: Diminished air movement in all lung fields. No wheezing, rhonci, or crackles. Abd: Bowel sounds present, nontender, nondistended, no masses. Extrem:  2+ bilateral pitting edema of lower extremities.      Disposition: home     Patient Instructions:   Current Discharge Medication List      START taking these medications    Details   doxycycline hyclate (VIBRA-TABS) 100 MG tablet Take 1 tablet by mouth 2 times daily for 3 days  Qty: 6 tablet, Refills: 0         CONTINUE these medications which have CHANGED    Details   predniSONE (DELTASONE) 20 MG tablet Take 2 tablets by mouth Daily with lunch for 5 days  Qty: 10 tablet, Refills: 0         CONTINUE these medications which have NOT CHANGED    Details   tiotropium (SPIRIVA HANDIHALER) 18 MCG inhalation capsule Inhale 18 mcg into the lungs every morning      metoprolol succinate (TOPROL XL) 100 MG extended release tablet TAKE 1 TABLET TWICE DAILY  Qty: 180 tablet, Refills: 3      omeprazole (PRILOSEC) 40 MG delayed release capsule TAKE 1 CAPSULE DAILY FOR STOMACH OR REFLUX  Qty: 90 capsule, Refills: 1    Associated Diagnoses: Chronic gastritis without bleeding, unspecified gastritis type      atorvastatin (LIPITOR) 80 MG tablet TAKE 1 TABLET EVERY DAY  Qty: 90 tablet, Refills: 3      warfarin (COUMADIN) 4 MG tablet Take 8 mg by mouth every evening       furosemide (LASIX) 40 MG tablet TAKE 1 TABLET EVERY DAY  Qty: 90 tablet, Refills: 3    Associated Diagnoses: Atrial fibrillation, unspecified type ([facility])      budesonide (PULMICORT) 0.5 MG/2ML nebulizer suspension INHALE THE CONTENTS OF 1 VIAL VIA NEBULIZER TWICE DAILY  Qty: 360 mL, Refills: 3      topiramate (TOPAMAX) 50 MG tablet Take 1 tablet by mouth 2 times daily  Qty: 180 tablet, Refills: 1    Associated Diagnoses: Benign essential tremor      memantine (NAMENDA) 10 MG

## 2018-12-14 NOTE — PROGRESS NOTES
For clarification, Mr. Juliet Alvarez has chronic respiratory failure with home O2 therapy, which was addressed during his hospital admission from 12/11/18-12/13/18.      Anthony Robb DO, PGY-1  12/14/18  10:47 AM

## 2018-12-18 NOTE — PROGRESS NOTES
11/20/18 159 lb (72.1 kg)     BP Readings from Last 3 Encounters:   12/18/18 138/78   12/13/18 (!) 117/54   11/20/18 124/60        Physical Exam:  General Appearance: alert and oriented to person, place and time, well developed and well- nourished, in no acute distress. Increased work of breathing. Skin: warm and dry, no rash or erythema  Head: normocephalic and atraumatic  Eyes: pupils equal, round, and reactive to light, extraocular eye movements intact, conjunctivae normal  ENT: tympanic membrane, external ear and ear canal normal bilaterally, nose without deformity, nasal mucosa and turbinates normal without polyps  Neck: supple and non-tender without mass, no thyromegaly or thyroid nodules, no cervical lymphadenopathy  Pulmonary/Chest: clear to auscultation bilaterally- effort increased, breath sounds decreased, especially in the bases. no wheezes, rales or rhonchi,   Cardiovascular: normal rate, regular rhythm, normal S1 and S2, no murmurs, rubs, clicks, or gallops, distal pulses intact, no carotid bruits  Abdomen: soft, non-tender, non-distended, normal bowel sounds, no masses or organomegaly  Extremities: no cyanosis, clubbing . +2 pitting edema bilaterally. Musculoskeletal: normal range of motion, no joint swelling, deformity or tenderness      Assessment/Plan:  1. Pneumonia due to infectious organism, unspecified laterality, unspecified part of lung  Improving to resolving.  - ND DISCHARGE MEDS RECONCILED W/ CURRENT OUTPATIENT MED LIST  Complete prednisone taper. Antibiotics already completed. Continue nasal cannula oxygen. Continue respiratory medication treatment plan. Prescription for Mucinex given to help loosen thick sputum. 2. Rectal bleeding  Likely local bleeding only. Old colonoscopy report reviewed and was performed within the past 18 months or so. No suspicious polyps or lesions at that time. Perianal care discussed.   Prescription for hydrocortisone cream given for local

## 2018-12-21 NOTE — TELEPHONE ENCOUNTER
Yes, they can continue the increased dose of Lasix through the weekend. I sent a short 3 day course of Imodium to his pharmacy.  As long as he does not have blood in stool and things are not worsening, then he can try the Imodium through the weekend

## 2018-12-28 NOTE — PROGRESS NOTES
pain, No heartburn,   No headaches,   No tingling, No numbness, No weakness,   No bowel changes, No hematochezia, No melena,  No bladder changes, No hematuria  No skin rashes, No skin lesions. No vision changes, No hearing changes,   No polyuria, polydipsia, polyphagia. Stable mood. ROS otherwise negative unless as listed in HPI. Chart reviewed and updated where appropriate for PMH, Fam, and Soc Hx. Physical Exam   /60 (Site: Right Upper Arm, Position: Sitting, Cuff Size: Medium Adult)   Pulse 82   Temp 97.6 °F (36.4 °C) (Oral)   Ht 5' 4\" (1.626 m)   Wt 169 lb (76.7 kg)   SpO2 92%   BMI 29.01 kg/m²   Wt Readings from Last 3 Encounters:   12/28/18 169 lb (76.7 kg)   12/18/18 174 lb (78.9 kg)   12/13/18 167 lb (75.8 kg)       Constitutional:    He is oriented to person, place, and time. He appears well-developed and well-nourished. HENT:    Nose: Nose normal. Congested. Mouth/Throat: Oropharynx is clear and moist.   Eyes:    Conjunctivae are normal.    Pupils are equal, round, and reactive to light. EOMI. Neck:    Normal range of motion. No thyromegaly or nodules noted. No bruit. No adenopathy  Cardiovascular:    Normal rate, regular rhythm and normal heart sounds. No murmur. No gallop and no friction rub. Pulmonary/Chest:    Effort increased but at baseline and breath sounds decreased but at baseline. No wheezes. No rales or rhonchi. Fairly clear bilaterally. Abdominal:    Soft. Bowel sounds are normal.    No distension. No tenderness. Musculoskeletal:    Normal range of motion. No joint swelling noted. +2 pitting peripheral edema.       Current Outpatient Prescriptions on File Prior to Visit   Medication Sig Dispense Refill    loperamide (IMODIUM A-D) 2 MG tablet Take 1 tablet by mouth 2 times daily as needed for Diarrhea 6 tablet 0    guaiFENesin (MUCINEX) 600 MG extended release tablet Take 2 tablets by mouth 2 times daily for 10 days 20 tablet 0    every 6 hours as needed for Wheezing or Shortness of Breath 3 Inhaler 3    formoterol (PERFOROMIST) 20 MCG/2ML nebulizer solution INHALE TWICE DAILY AS DIRECTED (Patient taking differently: Take 20 mcg by nebulization 2 times daily ) 120 mL 5    nitroGLYCERIN (NITROSTAT) 0.4 MG SL tablet Place 1 tablet under the tongue every 5 minutes as needed for Chest pain 25 tablet 3    aspirin 81 MG chewable tablet CHEW AND SWALLOW ONE TABLET EVERY DAY (Patient taking differently: Take 81 mg by mouth every morning ) 90 tablet 3    Multiple Vitamins-Minerals (MULTIVITAL PO) Take 1 tablet by mouth every morning       OXYGEN Please provide nocturnal oxygen at 2 liters nasal cannula.   Diagnosis-nocturnal hypoxemia  Faxed to [facility] [place] (Patient taking differently: 3 L/min by Nasal route continuous ) 1 Device 0     Current Facility-Administered Medications on File Prior to Visit   Medication Dose Route Frequency Provider Last Rate Last Dose    ipratropium (ATROVENT) 0.02 % nebulizer solution 0.5 mg  0.5 mg Nebulization Once Barbara Gilmore MD        albuterol (PROVENTIL) nebulizer solution 2.5 mg  2.5 mg Nebulization Once Barbara Gilmore MD           Patient Active Problem List   Diagnosis Code    CAD (coronary artery disease) I25.10    Chronic obstructive pulmonary disease ([facility]) J44.9    Hyperlipidemia E78.5    Parkinson's disease ([facility] [address] G20    Anticoagulated on Coumadin Z51.81, Z79.01    PAF (paroxysmal atrial fibrillation) ([facility]) I48.0    Chronic diastolic heart failure ([facility]) I50.32    COPD exacerbation ([facility] [address] J44.1    S/P ablation of atrial flutter Z98.890, Z86.79    GERD (gastroesophageal reflux disease) K21.9    Hypertension I10    Depression F32.9    Anxiety F41.9    Mitral valve disease I05.9    Hypoxemia in sleep R09.02    Hypothyroidism E03.9    Pure hypercholesterolemia E78.00    Bilateral carotid artery stenosis I65.23    Status post catheter ablation of atrial flutter Z98.890    Essential

## 2018-12-31 PROBLEM — G93.41 ACUTE METABOLIC ENCEPHALOPATHY: Status: RESOLVED | Noted: 2018-01-01 | Resolved: 2018-01-01

## 2019-01-01 ENCOUNTER — HOSPITAL ENCOUNTER (INPATIENT)
Age: 64
LOS: 6 days | Discharge: HOSPICE | DRG: 189 | End: 2019-01-18
Attending: EMERGENCY MEDICINE | Admitting: FAMILY MEDICINE
Payer: MEDICARE

## 2019-01-01 ENCOUNTER — APPOINTMENT (OUTPATIENT)
Dept: GENERAL RADIOLOGY | Age: 64
DRG: 193 | End: 2019-01-01
Payer: MEDICARE

## 2019-01-01 ENCOUNTER — APPOINTMENT (OUTPATIENT)
Dept: GENERAL RADIOLOGY | Age: 64
DRG: 189 | End: 2019-01-01
Payer: MEDICARE

## 2019-01-01 ENCOUNTER — TELEPHONE (OUTPATIENT)
Dept: PALLATIVE CARE | Age: 64
End: 2019-01-01

## 2019-01-01 ENCOUNTER — CARE COORDINATION (OUTPATIENT)
Dept: CARE COORDINATION | Age: 64
End: 2019-01-01

## 2019-01-01 ENCOUNTER — ANTI-COAG VISIT (OUTPATIENT)
Dept: FAMILY MEDICINE CLINIC | Age: 64
End: 2019-01-01

## 2019-01-01 ENCOUNTER — OFFICE VISIT (OUTPATIENT)
Dept: FAMILY MEDICINE CLINIC | Age: 64
End: 2019-01-01
Payer: MEDICARE

## 2019-01-01 ENCOUNTER — CARE COORDINATION (OUTPATIENT)
Dept: CASE MANAGEMENT | Age: 64
End: 2019-01-01

## 2019-01-01 VITALS
SYSTOLIC BLOOD PRESSURE: 124 MMHG | TEMPERATURE: 97.9 F | WEIGHT: 170 LBS | DIASTOLIC BLOOD PRESSURE: 68 MMHG | OXYGEN SATURATION: 91 % | HEART RATE: 90 BPM | BODY MASS INDEX: 29.18 KG/M2

## 2019-01-01 VITALS
DIASTOLIC BLOOD PRESSURE: 79 MMHG | OXYGEN SATURATION: 84 % | TEMPERATURE: 97.8 F | HEART RATE: 128 BPM | RESPIRATION RATE: 16 BRPM | BODY MASS INDEX: 28.64 KG/M2 | HEIGHT: 64 IN | SYSTOLIC BLOOD PRESSURE: 143 MMHG | WEIGHT: 167.77 LBS

## 2019-01-01 VITALS
WEIGHT: 164.4 LBS | TEMPERATURE: 98.3 F | SYSTOLIC BLOOD PRESSURE: 125 MMHG | RESPIRATION RATE: 20 BRPM | BODY MASS INDEX: 28.07 KG/M2 | HEIGHT: 64 IN | DIASTOLIC BLOOD PRESSURE: 58 MMHG | OXYGEN SATURATION: 96 % | HEART RATE: 76 BPM

## 2019-01-01 DIAGNOSIS — J44.1 COPD EXACERBATION (HCC): Primary | ICD-10-CM

## 2019-01-01 DIAGNOSIS — I48.91 ATRIAL FIBRILLATION WITH RVR (HCC): Primary | ICD-10-CM

## 2019-01-01 DIAGNOSIS — Z99.81 CHRONIC RESPIRATORY FAILURE WITH HYPOXIA, ON HOME O2 THERAPY (HCC): ICD-10-CM

## 2019-01-01 DIAGNOSIS — J44.1 COPD EXACERBATION (HCC): ICD-10-CM

## 2019-01-01 DIAGNOSIS — I48.0 PAROXYSMAL ATRIAL FIBRILLATION (HCC): ICD-10-CM

## 2019-01-01 DIAGNOSIS — J96.11 CHRONIC RESPIRATORY FAILURE WITH HYPOXIA, ON HOME O2 THERAPY (HCC): ICD-10-CM

## 2019-01-01 DIAGNOSIS — B33.8 RSV INFECTION: ICD-10-CM

## 2019-01-01 DIAGNOSIS — J11.1 INFLUENZA: ICD-10-CM

## 2019-01-01 PROBLEM — J18.9 COMMUNITY ACQUIRED PNEUMONIA OF LEFT LOWER LOBE OF LUNG: Status: RESOLVED | Noted: 2018-01-01 | Resolved: 2019-01-01

## 2019-01-01 PROBLEM — J10.1 INFLUENZA A: Status: ACTIVE | Noted: 2019-01-01

## 2019-01-01 LAB
AADO2: 91 MMHG
AADO2: 95.7 MMHG
ALBUMIN SERPL-MCNC: 3 G/DL (ref 3.5–4.7)
ALBUMIN SERPL-MCNC: 3.3 G/DL (ref 3.5–5.2)
ALBUMIN SERPL-MCNC: 3.4 G/DL (ref 3.5–5.2)
ALBUMIN SERPL-MCNC: 3.5 G/DL (ref 3.5–5.2)
ALBUMIN SERPL-MCNC: 3.9 G/DL (ref 3.5–5.2)
ALP BLD-CCNC: 103 U/L (ref 40–129)
ALP BLD-CCNC: 110 U/L (ref 40–129)
ALP BLD-CCNC: 85 U/L (ref 40–129)
ALP BLD-CCNC: 88 U/L (ref 40–129)
ALPHA-1-GLOBULIN: 0.3 G/DL (ref 0.2–0.4)
ALPHA-2-GLOBULIN: 1 G/FL (ref 0.5–1)
ALT SERPL-CCNC: 22 U/L (ref 0–40)
ALT SERPL-CCNC: 23 U/L (ref 0–40)
ALT SERPL-CCNC: 25 U/L (ref 0–40)
ALT SERPL-CCNC: 60 U/L (ref 0–40)
ANION GAP SERPL CALCULATED.3IONS-SCNC: 11 MMOL/L (ref 7–16)
ANION GAP SERPL CALCULATED.3IONS-SCNC: 11 MMOL/L (ref 7–16)
ANION GAP SERPL CALCULATED.3IONS-SCNC: 3 MMOL/L (ref 7–16)
ANION GAP SERPL CALCULATED.3IONS-SCNC: 3 MMOL/L (ref 7–16)
ANION GAP SERPL CALCULATED.3IONS-SCNC: 5 MMOL/L (ref 7–16)
ANION GAP SERPL CALCULATED.3IONS-SCNC: 6 MMOL/L (ref 7–16)
ANION GAP SERPL CALCULATED.3IONS-SCNC: 6 MMOL/L (ref 7–16)
ANISOCYTOSIS: ABNORMAL
AST SERPL-CCNC: 15 U/L (ref 0–39)
AST SERPL-CCNC: 16 U/L (ref 0–39)
AST SERPL-CCNC: 19 U/L (ref 0–39)
AST SERPL-CCNC: 39 U/L (ref 0–39)
B.E.: 14.9 MMOL/L (ref -3–3)
B.E.: 15.4 MMOL/L (ref -3–3)
B.E.: 15.9 MMOL/L (ref -3–3)
BACTERIA: ABNORMAL /HPF
BASOPHILIC STIPPLING: ABNORMAL
BASOPHILIC STIPPLING: ABNORMAL
BASOPHILS ABSOLUTE: 0 E9/L (ref 0–0.2)
BASOPHILS ABSOLUTE: 0.01 E9/L (ref 0–0.2)
BASOPHILS RELATIVE PERCENT: 0 % (ref 0–2)
BASOPHILS RELATIVE PERCENT: 0.1 % (ref 0–2)
BETA GLOBULIN: 1 G/DL (ref 0.8–1.3)
BILIRUB SERPL-MCNC: 0.3 MG/DL (ref 0–1.2)
BILIRUB SERPL-MCNC: 0.5 MG/DL (ref 0–1.2)
BILIRUB SERPL-MCNC: 0.6 MG/DL (ref 0–1.2)
BILIRUB SERPL-MCNC: 0.6 MG/DL (ref 0–1.2)
BILIRUBIN URINE: NEGATIVE
BILIRUBIN URINE: NEGATIVE
BLOOD CULTURE, ROUTINE: NORMAL
BLOOD CULTURE, ROUTINE: NORMAL
BLOOD, URINE: NEGATIVE
BLOOD, URINE: NEGATIVE
BUN BLDV-MCNC: 17 MG/DL (ref 8–23)
BUN BLDV-MCNC: 17 MG/DL (ref 8–23)
BUN BLDV-MCNC: 19 MG/DL (ref 8–23)
BUN BLDV-MCNC: 21 MG/DL (ref 8–23)
BUN BLDV-MCNC: 22 MG/DL (ref 8–23)
BUN BLDV-MCNC: 24 MG/DL (ref 8–23)
BUN BLDV-MCNC: 25 MG/DL (ref 8–23)
BUN BLDV-MCNC: 31 MG/DL (ref 8–23)
BUN BLDV-MCNC: 32 MG/DL (ref 8–23)
BUN BLDV-MCNC: 38 MG/DL (ref 8–23)
CALCIUM SERPL-MCNC: 8.5 MG/DL (ref 8.6–10.2)
CALCIUM SERPL-MCNC: 8.9 MG/DL (ref 8.6–10.2)
CALCIUM SERPL-MCNC: 9 MG/DL (ref 8.6–10.2)
CALCIUM SERPL-MCNC: 9.1 MG/DL (ref 8.6–10.2)
CALCIUM SERPL-MCNC: 9.1 MG/DL (ref 8.6–10.2)
CASTS UA: ABNORMAL /LPF
CHLORIDE BLD-SCNC: 101 MMOL/L (ref 98–107)
CHLORIDE BLD-SCNC: 93 MMOL/L (ref 98–107)
CHLORIDE BLD-SCNC: 94 MMOL/L (ref 98–107)
CHLORIDE BLD-SCNC: 96 MMOL/L (ref 98–107)
CHLORIDE BLD-SCNC: 97 MMOL/L (ref 98–107)
CHLORIDE BLD-SCNC: 97 MMOL/L (ref 98–107)
CHLORIDE BLD-SCNC: 98 MMOL/L (ref 98–107)
CHLORIDE BLD-SCNC: 99 MMOL/L (ref 98–107)
CLARITY: CLEAR
CLARITY: CLEAR
CO2: 29 MMOL/L (ref 22–29)
CO2: 31 MMOL/L (ref 22–29)
CO2: 38 MMOL/L (ref 22–29)
CO2: 39 MMOL/L (ref 22–29)
CO2: 39 MMOL/L (ref 22–29)
CO2: 40 MMOL/L (ref 22–29)
CO2: 42 MMOL/L (ref 22–29)
CO2: 43 MMOL/L (ref 22–29)
CO2: 43 MMOL/L (ref 22–29)
CO2: 45 MMOL/L (ref 22–29)
COHB: 0.8 % (ref 0–1.5)
COHB: 0.9 % (ref 0–1.5)
COHB: 0.9 % (ref 0–1.5)
COLOR: YELLOW
COLOR: YELLOW
CREAT SERPL-MCNC: 0.7 MG/DL (ref 0.7–1.2)
CREAT SERPL-MCNC: 0.8 MG/DL (ref 0.7–1.2)
CREAT SERPL-MCNC: 1 MG/DL (ref 0.7–1.2)
CREAT SERPL-MCNC: 1.1 MG/DL (ref 0.7–1.2)
CREAT SERPL-MCNC: 1.1 MG/DL (ref 0.7–1.2)
CRITICAL: ABNORMAL
CULTURE, BLOOD 2: NORMAL
CULTURE, BLOOD 2: NORMAL
CULTURE, RESPIRATORY: NORMAL
DATE ANALYZED: ABNORMAL
DATE OF COLLECTION: ABNORMAL
EKG ATRIAL RATE: 102 BPM
EKG ATRIAL RATE: 174 BPM
EKG ATRIAL RATE: 85 BPM
EKG P AXIS: 77 DEGREES
EKG P-R INTERVAL: 154 MS
EKG Q-T INTERVAL: 238 MS
EKG Q-T INTERVAL: 358 MS
EKG Q-T INTERVAL: 360 MS
EKG QRS DURATION: 72 MS
EKG QRS DURATION: 90 MS
EKG QRS DURATION: 94 MS
EKG QTC CALCULATION (BAZETT): 399 MS
EKG QTC CALCULATION (BAZETT): 418 MS
EKG QTC CALCULATION (BAZETT): 447 MS
EKG R AXIS: 70 DEGREES
EKG R AXIS: 71 DEGREES
EKG R AXIS: 75 DEGREES
EKG T AXIS: -25 DEGREES
EKG T AXIS: 103 DEGREES
EKG T AXIS: 74 DEGREES
EKG VENTRICULAR RATE: 169 BPM
EKG VENTRICULAR RATE: 82 BPM
EKG VENTRICULAR RATE: 93 BPM
ELECTROPHORESIS: ABNORMAL
EOSINOPHILS ABSOLUTE: 0 E9/L (ref 0.05–0.5)
EOSINOPHILS ABSOLUTE: 0.01 E9/L (ref 0.05–0.5)
EOSINOPHILS ABSOLUTE: 0.04 E9/L (ref 0.05–0.5)
EOSINOPHILS ABSOLUTE: 0.05 E9/L (ref 0.05–0.5)
EOSINOPHILS ABSOLUTE: 0.08 E9/L (ref 0.05–0.5)
EOSINOPHILS ABSOLUTE: 0.09 E9/L (ref 0.05–0.5)
EOSINOPHILS ABSOLUTE: 0.14 E9/L (ref 0.05–0.5)
EOSINOPHILS RELATIVE PERCENT: 0 % (ref 0–6)
EOSINOPHILS RELATIVE PERCENT: 0.1 % (ref 0–6)
EOSINOPHILS RELATIVE PERCENT: 0.3 % (ref 0–6)
EOSINOPHILS RELATIVE PERCENT: 0.3 % (ref 0–6)
EOSINOPHILS RELATIVE PERCENT: 0.6 % (ref 0–6)
EOSINOPHILS RELATIVE PERCENT: 0.6 % (ref 0–6)
EOSINOPHILS RELATIVE PERCENT: 1.5 % (ref 0–6)
FILM ARRAY ADENOVIRUS: NORMAL
FILM ARRAY BORDETELLA PERTUSSIS: NORMAL
FILM ARRAY CHLAMYDOPHILIA PNEUMONIAE: NORMAL
FILM ARRAY CORONAVIRUS 229E: NORMAL
FILM ARRAY CORONAVIRUS HKU1: NORMAL
FILM ARRAY CORONAVIRUS NL63: NORMAL
FILM ARRAY CORONAVIRUS OC43: NORMAL
FILM ARRAY INFLUENZA A VIRUS 09H1: NORMAL
FILM ARRAY INFLUENZA A VIRUS H1: NORMAL
FILM ARRAY INFLUENZA A VIRUS H3: NORMAL
FILM ARRAY INFLUENZA A VIRUS: NORMAL
FILM ARRAY INFLUENZA B: NORMAL
FILM ARRAY METAPNEUMOVIRUS: NORMAL
FILM ARRAY MYCOPLASMA PNEUMONIAE: NORMAL
FILM ARRAY PARAINFLUENZA VIRUS 1: NORMAL
FILM ARRAY PARAINFLUENZA VIRUS 2: NORMAL
FILM ARRAY PARAINFLUENZA VIRUS 3: NORMAL
FILM ARRAY PARAINFLUENZA VIRUS 4: NORMAL
FILM ARRAY RESPIRATORY SYNCITIAL VIRUS: NORMAL
FILM ARRAY RHINOVIRUS/ENTEROVIRUS: NORMAL
FIO2: 50 %
FIO2: 50 %
GAMMA GLOBULIN: 1.3 G/DL (ref 0.7–1.6)
GFR AFRICAN AMERICAN: >60
GFR NON-AFRICAN AMERICAN: >60 ML/MIN/1.73
GLUCOSE BLD-MCNC: 105 MG/DL (ref 74–99)
GLUCOSE BLD-MCNC: 113 MG/DL (ref 74–99)
GLUCOSE BLD-MCNC: 115 MG/DL (ref 74–99)
GLUCOSE BLD-MCNC: 124 MG/DL (ref 74–99)
GLUCOSE BLD-MCNC: 151 MG/DL (ref 74–99)
GLUCOSE BLD-MCNC: 156 MG/DL (ref 74–99)
GLUCOSE BLD-MCNC: 161 MG/DL (ref 74–99)
GLUCOSE BLD-MCNC: 168 MG/DL (ref 74–99)
GLUCOSE BLD-MCNC: 196 MG/DL (ref 74–99)
GLUCOSE BLD-MCNC: 92 MG/DL (ref 74–99)
GLUCOSE URINE: 100 MG/DL
GLUCOSE URINE: NEGATIVE MG/DL
HCO3: 45.8 MMOL/L (ref 22–26)
HCO3: 46.2 MMOL/L (ref 22–26)
HCO3: 48.9 MMOL/L (ref 22–26)
HCT VFR BLD CALC: 32.9 % (ref 37–54)
HCT VFR BLD CALC: 34.3 % (ref 37–54)
HCT VFR BLD CALC: 34.5 % (ref 37–54)
HCT VFR BLD CALC: 35.3 % (ref 37–54)
HCT VFR BLD CALC: 35.8 % (ref 37–54)
HCT VFR BLD CALC: 35.9 % (ref 37–54)
HCT VFR BLD CALC: 36 % (ref 37–54)
HCT VFR BLD CALC: 37.8 % (ref 37–54)
HCT VFR BLD CALC: 38 % (ref 37–54)
HCT VFR BLD CALC: 42.4 % (ref 37–54)
HEMOGLOBIN: 10.2 G/DL (ref 12.5–16.5)
HEMOGLOBIN: 10.2 G/DL (ref 12.5–16.5)
HEMOGLOBIN: 10.3 G/DL (ref 12.5–16.5)
HEMOGLOBIN: 10.5 G/DL (ref 12.5–16.5)
HEMOGLOBIN: 11 G/DL (ref 12.5–16.5)
HEMOGLOBIN: 12 G/DL (ref 12.5–16.5)
HEMOGLOBIN: 9.2 G/DL (ref 12.5–16.5)
HEMOGLOBIN: 9.6 G/DL (ref 12.5–16.5)
HEMOGLOBIN: 9.8 G/DL (ref 12.5–16.5)
HEMOGLOBIN: 9.9 G/DL (ref 12.5–16.5)
HHB: 0.7 % (ref 0–5)
HHB: 1.4 % (ref 0–5)
HHB: 1.5 % (ref 0–5)
HYPOCHROMIA: ABNORMAL
IMMATURE GRANULOCYTES #: 0.03 E9/L
IMMATURE GRANULOCYTES #: 0.03 E9/L
IMMATURE GRANULOCYTES #: 0.04 E9/L
IMMATURE GRANULOCYTES #: 0.05 E9/L
IMMATURE GRANULOCYTES #: 0.06 E9/L
IMMATURE GRANULOCYTES #: 0.07 E9/L
IMMATURE GRANULOCYTES #: 0.08 E9/L
IMMATURE GRANULOCYTES %: 0.3 % (ref 0–5)
IMMATURE GRANULOCYTES %: 0.3 % (ref 0–5)
IMMATURE GRANULOCYTES %: 0.4 % (ref 0–5)
IMMATURE GRANULOCYTES %: 0.5 % (ref 0–5)
INR BLD: 1.6
INR BLD: 1.7
INR BLD: 1.9
INR BLD: 2.2
INR BLD: 2.7
INR BLD: 2.7
INR BLD: 3.2
INR BLD: 3.7
INR BLD: 3.8
INR BLD: 3.9
INR BLD: 6.3
KETONES, URINE: NEGATIVE MG/DL
KETONES, URINE: NEGATIVE MG/DL
L. PNEUMOPHILA SEROGP 1 UR AG: NORMAL
L. PNEUMOPHILA SEROGP 1 UR AG: NORMAL
LAB: ABNORMAL
LACTIC ACID: 0.9 MMOL/L (ref 0.5–2.2)
LACTIC ACID: 1.2 MMOL/L (ref 0.5–2.2)
LEUKOCYTE ESTERASE, URINE: NEGATIVE
LEUKOCYTE ESTERASE, URINE: NEGATIVE
LYMPHOCYTES ABSOLUTE: 0.19 E9/L (ref 1.5–4)
LYMPHOCYTES ABSOLUTE: 0.35 E9/L (ref 1.5–4)
LYMPHOCYTES ABSOLUTE: 0.45 E9/L (ref 1.5–4)
LYMPHOCYTES ABSOLUTE: 0.63 E9/L (ref 1.5–4)
LYMPHOCYTES ABSOLUTE: 0.74 E9/L (ref 1.5–4)
LYMPHOCYTES ABSOLUTE: 0.88 E9/L (ref 1.5–4)
LYMPHOCYTES ABSOLUTE: 0.97 E9/L (ref 1.5–4)
LYMPHOCYTES ABSOLUTE: 1.12 E9/L (ref 1.5–4)
LYMPHOCYTES ABSOLUTE: 1.15 E9/L (ref 1.5–4)
LYMPHOCYTES ABSOLUTE: 1.77 E9/L (ref 1.5–4)
LYMPHOCYTES RELATIVE PERCENT: 1.8 % (ref 20–42)
LYMPHOCYTES RELATIVE PERCENT: 19.1 % (ref 20–42)
LYMPHOCYTES RELATIVE PERCENT: 2.6 % (ref 20–42)
LYMPHOCYTES RELATIVE PERCENT: 2.9 % (ref 20–42)
LYMPHOCYTES RELATIVE PERCENT: 5.5 % (ref 20–42)
LYMPHOCYTES RELATIVE PERCENT: 6.2 % (ref 20–42)
LYMPHOCYTES RELATIVE PERCENT: 6.3 % (ref 20–42)
LYMPHOCYTES RELATIVE PERCENT: 7.4 % (ref 20–42)
LYMPHOCYTES RELATIVE PERCENT: 8 % (ref 20–42)
LYMPHOCYTES RELATIVE PERCENT: 8.6 % (ref 20–42)
Lab: ABNORMAL
MAGNESIUM: 2.2 MG/DL (ref 1.6–2.6)
MAGNESIUM: 2.2 MG/DL (ref 1.6–2.6)
MAGNESIUM: 2.3 MG/DL (ref 1.6–2.6)
MAGNESIUM: 2.3 MG/DL (ref 1.6–2.6)
MAGNESIUM: 2.5 MG/DL (ref 1.6–2.6)
MCH RBC QN AUTO: 27.6 PG (ref 26–35)
MCH RBC QN AUTO: 27.9 PG (ref 26–35)
MCH RBC QN AUTO: 27.9 PG (ref 26–35)
MCH RBC QN AUTO: 28 PG (ref 26–35)
MCH RBC QN AUTO: 28.2 PG (ref 26–35)
MCH RBC QN AUTO: 28.2 PG (ref 26–35)
MCH RBC QN AUTO: 28.4 PG (ref 26–35)
MCH RBC QN AUTO: 28.5 PG (ref 26–35)
MCH RBC QN AUTO: 28.9 PG (ref 26–35)
MCH RBC QN AUTO: 29.1 PG (ref 26–35)
MCHC RBC AUTO-ENTMCNC: 27 % (ref 32–34.5)
MCHC RBC AUTO-ENTMCNC: 27.4 % (ref 32–34.5)
MCHC RBC AUTO-ENTMCNC: 27.8 % (ref 32–34.5)
MCHC RBC AUTO-ENTMCNC: 28 % (ref 32–34.5)
MCHC RBC AUTO-ENTMCNC: 28.3 % (ref 32–34.5)
MCHC RBC AUTO-ENTMCNC: 28.7 % (ref 32–34.5)
MCHC RBC AUTO-ENTMCNC: 28.9 % (ref 32–34.5)
MCHC RBC AUTO-ENTMCNC: 29.2 % (ref 32–34.5)
MCV RBC AUTO: 100.3 FL (ref 80–99.9)
MCV RBC AUTO: 100.3 FL (ref 80–99.9)
MCV RBC AUTO: 102 FL (ref 80–99.9)
MCV RBC AUTO: 102.2 FL (ref 80–99.9)
MCV RBC AUTO: 102.4 FL (ref 80–99.9)
MCV RBC AUTO: 97.5 FL (ref 80–99.9)
MCV RBC AUTO: 98.3 FL (ref 80–99.9)
MCV RBC AUTO: 98.4 FL (ref 80–99.9)
MCV RBC AUTO: 98.4 FL (ref 80–99.9)
MCV RBC AUTO: 99.7 FL (ref 80–99.9)
METHB: 0.2 % (ref 0–1.5)
METHB: 0.2 % (ref 0–1.5)
METHB: 0.3 % (ref 0–1.5)
MODE: ABNORMAL
MONOCYTES ABSOLUTE: 0.14 E9/L (ref 0.1–0.95)
MONOCYTES ABSOLUTE: 0.41 E9/L (ref 0.1–0.95)
MONOCYTES ABSOLUTE: 0.58 E9/L (ref 0.1–0.95)
MONOCYTES ABSOLUTE: 0.7 E9/L (ref 0.1–0.95)
MONOCYTES ABSOLUTE: 0.75 E9/L (ref 0.1–0.95)
MONOCYTES ABSOLUTE: 0.85 E9/L (ref 0.1–0.95)
MONOCYTES ABSOLUTE: 1.12 E9/L (ref 0.1–0.95)
MONOCYTES ABSOLUTE: 1.16 E9/L (ref 0.1–0.95)
MONOCYTES ABSOLUTE: 1.16 E9/L (ref 0.1–0.95)
MONOCYTES ABSOLUTE: 1.23 E9/L (ref 0.1–0.95)
MONOCYTES RELATIVE PERCENT: 1 % (ref 2–12)
MONOCYTES RELATIVE PERCENT: 4.5 % (ref 2–12)
MONOCYTES RELATIVE PERCENT: 4.8 % (ref 2–12)
MONOCYTES RELATIVE PERCENT: 6.1 % (ref 2–12)
MONOCYTES RELATIVE PERCENT: 7.2 % (ref 2–12)
MONOCYTES RELATIVE PERCENT: 7.4 % (ref 2–12)
MONOCYTES RELATIVE PERCENT: 7.9 % (ref 2–12)
MONOCYTES RELATIVE PERCENT: 8.1 % (ref 2–12)
MONOCYTES RELATIVE PERCENT: 8.3 % (ref 2–12)
MONOCYTES RELATIVE PERCENT: 8.3 % (ref 2–12)
MRSA CULTURE ONLY: NORMAL
NEUTROPHILS ABSOLUTE: 11.61 E9/L (ref 1.8–7.3)
NEUTROPHILS ABSOLUTE: 11.82 E9/L (ref 1.8–7.3)
NEUTROPHILS ABSOLUTE: 12.84 E9/L (ref 1.8–7.3)
NEUTROPHILS ABSOLUTE: 13.03 E9/L (ref 1.8–7.3)
NEUTROPHILS ABSOLUTE: 13.41 E9/L (ref 1.8–7.3)
NEUTROPHILS ABSOLUTE: 14.17 E9/L (ref 1.8–7.3)
NEUTROPHILS ABSOLUTE: 6.57 E9/L (ref 1.8–7.3)
NEUTROPHILS ABSOLUTE: 7.41 E9/L (ref 1.8–7.3)
NEUTROPHILS ABSOLUTE: 8.92 E9/L (ref 1.8–7.3)
NEUTROPHILS ABSOLUTE: 9.97 E9/L (ref 1.8–7.3)
NEUTROPHILS RELATIVE PERCENT: 70.8 % (ref 43–80)
NEUTROPHILS RELATIVE PERCENT: 82.6 % (ref 43–80)
NEUTROPHILS RELATIVE PERCENT: 83.8 % (ref 43–80)
NEUTROPHILS RELATIVE PERCENT: 84.2 % (ref 43–80)
NEUTROPHILS RELATIVE PERCENT: 85.8 % (ref 43–80)
NEUTROPHILS RELATIVE PERCENT: 86.2 % (ref 43–80)
NEUTROPHILS RELATIVE PERCENT: 86.6 % (ref 43–80)
NEUTROPHILS RELATIVE PERCENT: 92 % (ref 43–80)
NEUTROPHILS RELATIVE PERCENT: 92.1 % (ref 43–80)
NEUTROPHILS RELATIVE PERCENT: 95.9 % (ref 43–80)
NITRITE, URINE: NEGATIVE
NITRITE, URINE: NEGATIVE
NUCLEATED RED BLOOD CELLS: 0 /100 WBC
O2 CONTENT: 14.6 ML/DL
O2 CONTENT: 15.2 ML/DL
O2 CONTENT: 16.1 ML/DL
O2 SATURATION: 98.5 % (ref 92–98.5)
O2 SATURATION: 98.6 % (ref 92–98.5)
O2 SATURATION: 99.3 % (ref 92–98.5)
O2HB: 97.4 % (ref 94–97)
O2HB: 97.6 % (ref 94–97)
O2HB: 98.1 % (ref 94–97)
OPERATOR ID: 1661
OPERATOR ID: ABNORMAL
OPERATOR ID: ABNORMAL
OVALOCYTES: ABNORMAL
PATIENT TEMP: 37 C
PCO2: 100.3 MMHG (ref 35–45)
PCO2: 100.5 MMHG (ref 35–45)
PCO2: 149.7 MMHG (ref 35–45)
PDW BLD-RTO: 15.5 FL (ref 11.5–15)
PDW BLD-RTO: 15.6 FL (ref 11.5–15)
PDW BLD-RTO: 15.8 FL (ref 11.5–15)
PDW BLD-RTO: 15.9 FL (ref 11.5–15)
PDW BLD-RTO: 16 FL (ref 11.5–15)
PDW BLD-RTO: 16 FL (ref 11.5–15)
PDW BLD-RTO: 16.1 FL (ref 11.5–15)
PDW BLD-RTO: 16.1 FL (ref 11.5–15)
PEEP/CPAP: 5 CMH2O
PEEP/CPAP: 5 CMH2O
PFO2: 2.71 MMHG/%
PFO2: 2.8 MMHG/%
PH BLOOD GAS: 7.13 (ref 7.35–7.45)
PH BLOOD GAS: 7.28 (ref 7.35–7.45)
PH BLOOD GAS: 7.28 (ref 7.35–7.45)
PH UA: 5 (ref 5–9)
PH UA: 5.5 (ref 5–9)
PHOSPHORUS: 2.6 MG/DL (ref 2.5–4.5)
PHOSPHORUS: 2.9 MG/DL (ref 2.5–4.5)
PLATELET # BLD: 247 E9/L (ref 130–450)
PLATELET # BLD: 250 E9/L (ref 130–450)
PLATELET # BLD: 267 E9/L (ref 130–450)
PLATELET # BLD: 275 E9/L (ref 130–450)
PLATELET # BLD: 290 E9/L (ref 130–450)
PLATELET # BLD: 314 E9/L (ref 130–450)
PLATELET # BLD: 325 E9/L (ref 130–450)
PLATELET # BLD: 344 E9/L (ref 130–450)
PLATELET # BLD: 346 E9/L (ref 130–450)
PLATELET # BLD: 367 E9/L (ref 130–450)
PMV BLD AUTO: 10.1 FL (ref 7–12)
PMV BLD AUTO: 10.5 FL (ref 7–12)
PMV BLD AUTO: 10.7 FL (ref 7–12)
PMV BLD AUTO: 10.8 FL (ref 7–12)
PMV BLD AUTO: 10.9 FL (ref 7–12)
PMV BLD AUTO: 11 FL (ref 7–12)
PMV BLD AUTO: 11.1 FL (ref 7–12)
PMV BLD AUTO: 11.2 FL (ref 7–12)
PO2: 135.5 MMHG (ref 60–100)
PO2: 139.9 MMHG (ref 60–100)
PO2: 230 MMHG (ref 60–100)
POIKILOCYTES: ABNORMAL
POLYCHROMASIA: ABNORMAL
POTASSIUM REFLEX MAGNESIUM: 4 MMOL/L (ref 3.5–5)
POTASSIUM REFLEX MAGNESIUM: 4.3 MMOL/L (ref 3.5–5)
POTASSIUM REFLEX MAGNESIUM: 4.5 MMOL/L (ref 3.5–5)
POTASSIUM REFLEX MAGNESIUM: 4.7 MMOL/L (ref 3.5–5)
POTASSIUM REFLEX MAGNESIUM: 4.8 MMOL/L (ref 3.5–5)
POTASSIUM REFLEX MAGNESIUM: 5.1 MMOL/L (ref 3.5–5)
POTASSIUM SERPL-SCNC: 5.2 MMOL/L (ref 3.5–5)
PRO-BNP: 3148 PG/ML (ref 0–125)
PROCALCITONIN: 0.04 NG/ML (ref 0–0.08)
PROCALCITONIN: 0.07 NG/ML (ref 0–0.08)
PROCALCITONIN: 0.08 NG/ML (ref 0–0.08)
PROTEIN UA: 30 MG/DL
PROTEIN UA: NEGATIVE MG/DL
PROTHROMBIN TIME: 18 SEC (ref 9.3–12.4)
PROTHROMBIN TIME: 19.6 SEC (ref 9.3–12.4)
PROTHROMBIN TIME: 21.9 SEC (ref 9.3–12.4)
PROTHROMBIN TIME: 25.6 SEC (ref 9.3–12.4)
PROTHROMBIN TIME: 29.7 SEC (ref 9.3–12.4)
PROTHROMBIN TIME: 35 SEC (ref 9.3–12.4)
PROTHROMBIN TIME: 41.9 SEC (ref 9.3–12.4)
PROTHROMBIN TIME: 42.3 SEC (ref 9.3–12.4)
PROTHROMBIN TIME: 43.6 SEC (ref 9.3–12.4)
RBC # BLD: 3.28 E12/L (ref 3.8–5.8)
RBC # BLD: 3.44 E12/L (ref 3.8–5.8)
RBC # BLD: 3.49 E12/L (ref 3.8–5.8)
RBC # BLD: 3.51 E12/L (ref 3.8–5.8)
RBC # BLD: 3.61 E12/L (ref 3.8–5.8)
RBC # BLD: 3.62 E12/L (ref 3.8–5.8)
RBC # BLD: 3.65 E12/L (ref 3.8–5.8)
RBC # BLD: 3.69 E12/L (ref 3.8–5.8)
RBC # BLD: 3.86 E12/L (ref 3.8–5.8)
RBC # BLD: 4.15 E12/L (ref 3.8–5.8)
RBC UA: ABNORMAL /HPF (ref 0–2)
RI(T): 65 %
RI(T): 71 %
RR MECHANICAL: 20 B/MIN
RR MECHANICAL: 20 B/MIN
SMEAR, RESPIRATORY: NORMAL
SODIUM BLD-SCNC: 138 MMOL/L (ref 132–146)
SODIUM BLD-SCNC: 139 MMOL/L (ref 132–146)
SODIUM BLD-SCNC: 139 MMOL/L (ref 132–146)
SODIUM BLD-SCNC: 142 MMOL/L (ref 132–146)
SODIUM BLD-SCNC: 143 MMOL/L (ref 132–146)
SODIUM BLD-SCNC: 144 MMOL/L (ref 132–146)
SODIUM BLD-SCNC: 145 MMOL/L (ref 132–146)
SOURCE, BLOOD GAS: ABNORMAL
SPECIFIC GRAVITY UA: 1.01 (ref 1–1.03)
SPECIFIC GRAVITY UA: 1.02 (ref 1–1.03)
STREP PNEUMONIAE ANTIGEN, URINE: NORMAL
STREP PNEUMONIAE ANTIGEN, URINE: NORMAL
T4 FREE: 0.99 NG/DL (ref 0.93–1.7)
THB: 10.5 G/DL (ref 11.5–16.5)
THB: 10.9 G/DL (ref 11.5–16.5)
THB: 11.3 G/DL (ref 11.5–16.5)
TIME ANALYZED: 1710
TIME ANALYZED: 522
TIME ANALYZED: 535
TOTAL PROTEIN: 6.3 G/DL (ref 6.4–8.3)
TOTAL PROTEIN: 6.5 G/DL (ref 6.4–8.3)
TOTAL PROTEIN: 6.6 G/DL (ref 6.4–8.3)
TOTAL PROTEIN: 6.9 G/DL (ref 6.4–8.3)
TOTAL PROTEIN: 7.2 G/DL (ref 6.4–8.3)
TROPONIN: <0.01 NG/ML (ref 0–0.03)
TSH SERPL DL<=0.05 MIU/L-ACNC: 0.66 UIU/ML (ref 0.27–4.2)
URINE CULTURE, ROUTINE: NORMAL
URINE CULTURE, ROUTINE: NORMAL
UROBILINOGEN, URINE: 0.2 E.U./DL
UROBILINOGEN, URINE: 2 E.U./DL
VT MECHANICAL: 500 ML
VT MECHANICAL: 600 ML
WBC # BLD: 11.5 E9/L (ref 4.5–11.5)
WBC # BLD: 13.4 E9/L (ref 4.5–11.5)
WBC # BLD: 14 E9/L (ref 4.5–11.5)
WBC # BLD: 14 E9/L (ref 4.5–11.5)
WBC # BLD: 15.4 E9/L (ref 4.5–11.5)
WBC # BLD: 15.5 E9/L (ref 4.5–11.5)
WBC # BLD: 15.6 E9/L (ref 4.5–11.5)
WBC # BLD: 8.6 E9/L (ref 4.5–11.5)
WBC # BLD: 9.3 E9/L (ref 4.5–11.5)
WBC # BLD: 9.7 E9/L (ref 4.5–11.5)
WBC UA: ABNORMAL /HPF (ref 0–5)

## 2019-01-01 PROCEDURE — 36415 COLL VENOUS BLD VENIPUNCTURE: CPT

## 2019-01-01 PROCEDURE — 97165 OT EVAL LOW COMPLEX 30 MIN: CPT

## 2019-01-01 PROCEDURE — 80048 BASIC METABOLIC PNL TOTAL CA: CPT

## 2019-01-01 PROCEDURE — 71045 X-RAY EXAM CHEST 1 VIEW: CPT

## 2019-01-01 PROCEDURE — 6370000000 HC RX 637 (ALT 250 FOR IP): Performed by: FAMILY MEDICINE

## 2019-01-01 PROCEDURE — 2000000000 HC ICU R&B

## 2019-01-01 PROCEDURE — 36600 WITHDRAWAL OF ARTERIAL BLOOD: CPT

## 2019-01-01 PROCEDURE — 96375 TX/PRO/DX INJ NEW DRUG ADDON: CPT

## 2019-01-01 PROCEDURE — 2580000003 HC RX 258: Performed by: FAMILY MEDICINE

## 2019-01-01 PROCEDURE — 6360000002 HC RX W HCPCS: Performed by: INTERNAL MEDICINE

## 2019-01-01 PROCEDURE — 99232 SBSQ HOSP IP/OBS MODERATE 35: CPT | Performed by: FAMILY MEDICINE

## 2019-01-01 PROCEDURE — 97161 PT EVAL LOW COMPLEX 20 MIN: CPT

## 2019-01-01 PROCEDURE — 6370000000 HC RX 637 (ALT 250 FOR IP): Performed by: STUDENT IN AN ORGANIZED HEALTH CARE EDUCATION/TRAINING PROGRAM

## 2019-01-01 PROCEDURE — 6360000002 HC RX W HCPCS: Performed by: FAMILY MEDICINE

## 2019-01-01 PROCEDURE — 94660 CPAP INITIATION&MGMT: CPT

## 2019-01-01 PROCEDURE — 96374 THER/PROPH/DIAG INJ IV PUSH: CPT

## 2019-01-01 PROCEDURE — 81003 URINALYSIS AUTO W/O SCOPE: CPT

## 2019-01-01 PROCEDURE — 94640 AIRWAY INHALATION TREATMENT: CPT

## 2019-01-01 PROCEDURE — 6360000002 HC RX W HCPCS: Performed by: STUDENT IN AN ORGANIZED HEALTH CARE EDUCATION/TRAINING PROGRAM

## 2019-01-01 PROCEDURE — 94664 DEMO&/EVAL PT USE INHALER: CPT

## 2019-01-01 PROCEDURE — 85025 COMPLETE CBC W/AUTO DIFF WBC: CPT

## 2019-01-01 PROCEDURE — 6370000000 HC RX 637 (ALT 250 FOR IP): Performed by: INTERNAL MEDICINE

## 2019-01-01 PROCEDURE — 2500000003 HC RX 250 WO HCPCS: Performed by: STUDENT IN AN ORGANIZED HEALTH CARE EDUCATION/TRAINING PROGRAM

## 2019-01-01 PROCEDURE — 2580000003 HC RX 258: Performed by: STUDENT IN AN ORGANIZED HEALTH CARE EDUCATION/TRAINING PROGRAM

## 2019-01-01 PROCEDURE — 51702 INSERT TEMP BLADDER CATH: CPT

## 2019-01-01 PROCEDURE — S0028 INJECTION, FAMOTIDINE, 20 MG: HCPCS | Performed by: STUDENT IN AN ORGANIZED HEALTH CARE EDUCATION/TRAINING PROGRAM

## 2019-01-01 PROCEDURE — 93000 ELECTROCARDIOGRAM COMPLETE: CPT | Performed by: FAMILY MEDICINE

## 2019-01-01 PROCEDURE — 85610 PROTHROMBIN TIME: CPT

## 2019-01-01 PROCEDURE — 83735 ASSAY OF MAGNESIUM: CPT

## 2019-01-01 PROCEDURE — 2700000000 HC OXYGEN THERAPY PER DAY

## 2019-01-01 PROCEDURE — 99233 SBSQ HOSP IP/OBS HIGH 50: CPT | Performed by: NURSE PRACTITIONER

## 2019-01-01 PROCEDURE — 6360000002 HC RX W HCPCS: Performed by: EMERGENCY MEDICINE

## 2019-01-01 PROCEDURE — 87798 DETECT AGENT NOS DNA AMP: CPT

## 2019-01-01 PROCEDURE — 80053 COMPREHEN METABOLIC PANEL: CPT

## 2019-01-01 PROCEDURE — 99233 SBSQ HOSP IP/OBS HIGH 50: CPT | Performed by: INTERNAL MEDICINE

## 2019-01-01 PROCEDURE — 2060000000 HC ICU INTERMEDIATE R&B

## 2019-01-01 PROCEDURE — 99232 SBSQ HOSP IP/OBS MODERATE 35: CPT | Performed by: INTERNAL MEDICINE

## 2019-01-01 PROCEDURE — 87070 CULTURE OTHR SPECIMN AEROBIC: CPT

## 2019-01-01 PROCEDURE — 2580000003 HC RX 258

## 2019-01-01 PROCEDURE — 83605 ASSAY OF LACTIC ACID: CPT

## 2019-01-01 PROCEDURE — 99239 HOSP IP/OBS DSCHRG MGMT >30: CPT | Performed by: FAMILY MEDICINE

## 2019-01-01 PROCEDURE — 84145 PROCALCITONIN (PCT): CPT

## 2019-01-01 PROCEDURE — 82805 BLOOD GASES W/O2 SATURATION: CPT

## 2019-01-01 PROCEDURE — APPSS180 APP SPLIT SHARED TIME > 60 MINUTES: Performed by: NURSE PRACTITIONER

## 2019-01-01 PROCEDURE — 87450 HC DIRECT STREP B ANTIGEN: CPT

## 2019-01-01 PROCEDURE — 87088 URINE BACTERIA CULTURE: CPT

## 2019-01-01 PROCEDURE — 1111F DSCHRG MED/CURRENT MED MERGE: CPT | Performed by: FAMILY MEDICINE

## 2019-01-01 PROCEDURE — 84443 ASSAY THYROID STIM HORMONE: CPT

## 2019-01-01 PROCEDURE — 2500000003 HC RX 250 WO HCPCS: Performed by: INTERNAL MEDICINE

## 2019-01-01 PROCEDURE — 87040 BLOOD CULTURE FOR BACTERIA: CPT

## 2019-01-01 PROCEDURE — 87486 CHLMYD PNEUM DNA AMP PROBE: CPT

## 2019-01-01 PROCEDURE — 87081 CULTURE SCREEN ONLY: CPT

## 2019-01-01 PROCEDURE — 84100 ASSAY OF PHOSPHORUS: CPT

## 2019-01-01 PROCEDURE — 84484 ASSAY OF TROPONIN QUANT: CPT

## 2019-01-01 PROCEDURE — 87633 RESP VIRUS 12-25 TARGETS: CPT

## 2019-01-01 PROCEDURE — 99223 1ST HOSP IP/OBS HIGH 75: CPT | Performed by: NURSE PRACTITIONER

## 2019-01-01 PROCEDURE — C9113 INJ PANTOPRAZOLE SODIUM, VIA: HCPCS | Performed by: FAMILY MEDICINE

## 2019-01-01 PROCEDURE — 93010 ELECTROCARDIOGRAM REPORT: CPT | Performed by: INTERNAL MEDICINE

## 2019-01-01 PROCEDURE — 83880 ASSAY OF NATRIURETIC PEPTIDE: CPT

## 2019-01-01 PROCEDURE — 92526 ORAL FUNCTION THERAPY: CPT | Performed by: SPEECH-LANGUAGE PATHOLOGIST

## 2019-01-01 PROCEDURE — 84439 ASSAY OF FREE THYROXINE: CPT

## 2019-01-01 PROCEDURE — 99285 EMERGENCY DEPT VISIT HI MDM: CPT

## 2019-01-01 PROCEDURE — 81001 URINALYSIS AUTO W/SCOPE: CPT

## 2019-01-01 PROCEDURE — 93005 ELECTROCARDIOGRAM TRACING: CPT | Performed by: STUDENT IN AN ORGANIZED HEALTH CARE EDUCATION/TRAINING PROGRAM

## 2019-01-01 PROCEDURE — 6370000000 HC RX 637 (ALT 250 FOR IP): Performed by: EMERGENCY MEDICINE

## 2019-01-01 PROCEDURE — 6370000000 HC RX 637 (ALT 250 FOR IP)

## 2019-01-01 PROCEDURE — 99214 OFFICE O/P EST MOD 30 MIN: CPT | Performed by: FAMILY MEDICINE

## 2019-01-01 PROCEDURE — 99222 1ST HOSP IP/OBS MODERATE 55: CPT | Performed by: FAMILY MEDICINE

## 2019-01-01 PROCEDURE — 99233 SBSQ HOSP IP/OBS HIGH 50: CPT | Performed by: EMERGENCY MEDICINE

## 2019-01-01 PROCEDURE — 87581 M.PNEUMON DNA AMP PROBE: CPT

## 2019-01-01 PROCEDURE — 99222 1ST HOSP IP/OBS MODERATE 55: CPT | Performed by: INTERNAL MEDICINE

## 2019-01-01 RX ORDER — GUAIFENESIN 400 MG/1
400 TABLET ORAL 3 TIMES DAILY PRN
Status: DISCONTINUED | OUTPATIENT
Start: 2019-01-01 | End: 2019-01-01

## 2019-01-01 RX ORDER — ASPIRIN 81 MG/1
81 TABLET, CHEWABLE ORAL EVERY MORNING
Status: DISCONTINUED | OUTPATIENT
Start: 2019-01-01 | End: 2019-01-01 | Stop reason: HOSPADM

## 2019-01-01 RX ORDER — PANTOPRAZOLE SODIUM 40 MG/10ML
40 INJECTION, POWDER, LYOPHILIZED, FOR SOLUTION INTRAVENOUS ONCE
Status: COMPLETED | OUTPATIENT
Start: 2019-01-01 | End: 2019-01-01

## 2019-01-01 RX ORDER — DIMETHICONE, OXYBENZONE, AND PADIMATE O 2; 2.5; 6.6 G/100G; G/100G; G/100G
STICK TOPICAL
Status: COMPLETED
Start: 2019-01-01 | End: 2019-01-01

## 2019-01-01 RX ORDER — MEMANTINE HYDROCHLORIDE 10 MG/1
10 TABLET ORAL 2 TIMES DAILY
Status: DISCONTINUED | OUTPATIENT
Start: 2019-01-01 | End: 2019-01-01 | Stop reason: HOSPADM

## 2019-01-01 RX ORDER — DIGOXIN 0.25 MG/ML
125 INJECTION INTRAMUSCULAR; INTRAVENOUS DAILY
Status: DISCONTINUED | OUTPATIENT
Start: 2019-01-01 | End: 2019-01-01 | Stop reason: HOSPADM

## 2019-01-01 RX ORDER — DIGOXIN 0.25 MG/ML
250 INJECTION INTRAMUSCULAR; INTRAVENOUS PRN
Status: DISCONTINUED | OUTPATIENT
Start: 2019-01-01 | End: 2019-01-01

## 2019-01-01 RX ORDER — PANTOPRAZOLE SODIUM 40 MG/1
40 TABLET, DELAYED RELEASE ORAL
Status: DISCONTINUED | OUTPATIENT
Start: 2019-01-01 | End: 2019-01-01 | Stop reason: HOSPADM

## 2019-01-01 RX ORDER — METOPROLOL TARTRATE 5 MG/5ML
5 INJECTION INTRAVENOUS EVERY 6 HOURS
Status: DISCONTINUED | OUTPATIENT
Start: 2019-01-01 | End: 2019-01-01 | Stop reason: HOSPADM

## 2019-01-01 RX ORDER — FUROSEMIDE 40 MG/1
40 TABLET ORAL DAILY
Status: DISCONTINUED | OUTPATIENT
Start: 2019-01-01 | End: 2019-01-01

## 2019-01-01 RX ORDER — MORPHINE SULFATE 2 MG/ML
2 INJECTION, SOLUTION INTRAMUSCULAR; INTRAVENOUS
Status: DISCONTINUED | OUTPATIENT
Start: 2019-01-01 | End: 2019-01-01 | Stop reason: SDUPTHER

## 2019-01-01 RX ORDER — DILTIAZEM HYDROCHLORIDE 120 MG/1
120 CAPSULE, COATED, EXTENDED RELEASE ORAL DAILY
Status: DISCONTINUED | OUTPATIENT
Start: 2019-01-01 | End: 2019-01-01 | Stop reason: HOSPADM

## 2019-01-01 RX ORDER — LEVALBUTEROL INHALATION SOLUTION 0.63 MG/3ML
0.63 SOLUTION RESPIRATORY (INHALATION) EVERY 6 HOURS PRN
Status: DISCONTINUED | OUTPATIENT
Start: 2019-01-01 | End: 2019-01-01

## 2019-01-01 RX ORDER — METHYLPREDNISOLONE SODIUM SUCCINATE 40 MG/ML
40 INJECTION, POWDER, LYOPHILIZED, FOR SOLUTION INTRAMUSCULAR; INTRAVENOUS EVERY 6 HOURS
Status: DISCONTINUED | OUTPATIENT
Start: 2019-01-01 | End: 2019-01-01 | Stop reason: HOSPADM

## 2019-01-01 RX ORDER — OSELTAMIVIR PHOSPHATE 75 MG/1
75 CAPSULE ORAL 2 TIMES DAILY
Status: DISCONTINUED | OUTPATIENT
Start: 2019-01-01 | End: 2019-01-01 | Stop reason: HOSPADM

## 2019-01-01 RX ORDER — WARFARIN SODIUM 4 MG/1
8 TABLET ORAL EVERY EVENING
Status: DISCONTINUED | OUTPATIENT
Start: 2019-01-01 | End: 2019-01-01

## 2019-01-01 RX ORDER — METHYLPREDNISOLONE SODIUM SUCCINATE 40 MG/ML
40 INJECTION, POWDER, LYOPHILIZED, FOR SOLUTION INTRAMUSCULAR; INTRAVENOUS EVERY 6 HOURS
Status: DISCONTINUED | OUTPATIENT
Start: 2019-01-01 | End: 2019-01-01

## 2019-01-01 RX ORDER — GUAIFENESIN 400 MG/1
400 TABLET ORAL 3 TIMES DAILY PRN
Qty: 15 TABLET | Refills: 0 | Status: ON HOLD | OUTPATIENT
Start: 2019-01-01 | End: 2019-01-01 | Stop reason: HOSPADM

## 2019-01-01 RX ORDER — FORMOTEROL FUMARATE 20 UG/2ML
20 SOLUTION RESPIRATORY (INHALATION) 2 TIMES DAILY
Status: DISCONTINUED | OUTPATIENT
Start: 2019-01-01 | End: 2019-01-01 | Stop reason: HOSPADM

## 2019-01-01 RX ORDER — DIGOXIN 0.25 MG/ML
250 INJECTION INTRAMUSCULAR; INTRAVENOUS ONCE
Status: COMPLETED | OUTPATIENT
Start: 2019-01-01 | End: 2019-01-01

## 2019-01-01 RX ORDER — METHYLPREDNISOLONE SODIUM SUCCINATE 40 MG/ML
40 INJECTION, POWDER, LYOPHILIZED, FOR SOLUTION INTRAMUSCULAR; INTRAVENOUS EVERY 12 HOURS
Status: DISCONTINUED | OUTPATIENT
Start: 2019-01-01 | End: 2019-01-01

## 2019-01-01 RX ORDER — OSELTAMIVIR PHOSPHATE 75 MG/1
75 CAPSULE ORAL 2 TIMES DAILY
Qty: 5 CAPSULE | Refills: 0 | Status: SHIPPED | OUTPATIENT
Start: 2019-01-01 | End: 2019-01-01

## 2019-01-01 RX ORDER — DIGOXIN 0.25 MG/ML
250 INJECTION INTRAMUSCULAR; INTRAVENOUS EVERY 6 HOURS
Status: COMPLETED | OUTPATIENT
Start: 2019-01-01 | End: 2019-01-01

## 2019-01-01 RX ORDER — ACETAMINOPHEN 500 MG
1000 TABLET ORAL ONCE
Status: COMPLETED | OUTPATIENT
Start: 2019-01-01 | End: 2019-01-01

## 2019-01-01 RX ORDER — DIGOXIN 0.25 MG/ML
500 INJECTION INTRAMUSCULAR; INTRAVENOUS ONCE
Status: COMPLETED | OUTPATIENT
Start: 2019-01-01 | End: 2019-01-01

## 2019-01-01 RX ORDER — GUAIFENESIN 400 MG/1
400 TABLET ORAL 3 TIMES DAILY
Status: DISCONTINUED | OUTPATIENT
Start: 2019-01-01 | End: 2019-01-01 | Stop reason: HOSPADM

## 2019-01-01 RX ORDER — FUROSEMIDE 10 MG/ML
20 INJECTION INTRAMUSCULAR; INTRAVENOUS ONCE
Status: COMPLETED | OUTPATIENT
Start: 2019-01-01 | End: 2019-01-01

## 2019-01-01 RX ORDER — LOPERAMIDE HYDROCHLORIDE 2 MG/1
2 CAPSULE ORAL 2 TIMES DAILY PRN
Status: DISCONTINUED | OUTPATIENT
Start: 2019-01-01 | End: 2019-01-01 | Stop reason: HOSPADM

## 2019-01-01 RX ORDER — DOCUSATE SODIUM 100 MG/1
100 CAPSULE, LIQUID FILLED ORAL 2 TIMES DAILY
Status: DISCONTINUED | OUTPATIENT
Start: 2019-01-01 | End: 2019-01-01 | Stop reason: HOSPADM

## 2019-01-01 RX ORDER — LOPERAMIDE HYDROCHLORIDE 2 MG/1
2 TABLET ORAL 2 TIMES DAILY PRN
Qty: 6 TABLET | Refills: 0 | Status: ON HOLD | OUTPATIENT
Start: 2019-01-01 | End: 2019-01-01 | Stop reason: HOSPADM

## 2019-01-01 RX ORDER — ACETAMINOPHEN 500 MG
500 TABLET ORAL EVERY 6 HOURS PRN
Status: DISCONTINUED | OUTPATIENT
Start: 2019-01-01 | End: 2019-01-01 | Stop reason: HOSPADM

## 2019-01-01 RX ORDER — IPRATROPIUM BROMIDE AND ALBUTEROL SULFATE 2.5; .5 MG/3ML; MG/3ML
3 SOLUTION RESPIRATORY (INHALATION) ONCE
Status: COMPLETED | OUTPATIENT
Start: 2019-01-01 | End: 2019-01-01

## 2019-01-01 RX ORDER — DILTIAZEM HYDROCHLORIDE 5 MG/ML
10 INJECTION INTRAVENOUS ONCE
Status: COMPLETED | OUTPATIENT
Start: 2019-01-01 | End: 2019-01-01

## 2019-01-01 RX ORDER — GUAIFENESIN 600 MG/1
600 TABLET, EXTENDED RELEASE ORAL 2 TIMES DAILY
Status: DISCONTINUED | OUTPATIENT
Start: 2019-01-01 | End: 2019-01-01 | Stop reason: CLARIF

## 2019-01-01 RX ORDER — METHYLPREDNISOLONE SODIUM SUCCINATE 40 MG/ML
40 INJECTION, POWDER, LYOPHILIZED, FOR SOLUTION INTRAMUSCULAR; INTRAVENOUS DAILY
Status: DISCONTINUED | OUTPATIENT
Start: 2019-01-01 | End: 2019-01-01

## 2019-01-01 RX ORDER — WARFARIN SODIUM 4 MG/1
8 TABLET ORAL NIGHTLY
Status: DISCONTINUED | OUTPATIENT
Start: 2019-01-01 | End: 2019-01-01 | Stop reason: HOSPADM

## 2019-01-01 RX ORDER — 0.9 % SODIUM CHLORIDE 0.9 %
10 VIAL (ML) INJECTION ONCE
Status: COMPLETED | OUTPATIENT
Start: 2019-01-01 | End: 2019-01-01

## 2019-01-01 RX ORDER — IPRATROPIUM BROMIDE AND ALBUTEROL SULFATE 2.5; .5 MG/3ML; MG/3ML
1 SOLUTION RESPIRATORY (INHALATION) EVERY 4 HOURS
Status: DISCONTINUED | OUTPATIENT
Start: 2019-01-01 | End: 2019-01-01

## 2019-01-01 RX ORDER — SODIUM CHLORIDE 0.9 % (FLUSH) 0.9 %
10 SYRINGE (ML) INJECTION PRN
Status: DISCONTINUED | OUTPATIENT
Start: 2019-01-01 | End: 2019-01-01 | Stop reason: HOSPADM

## 2019-01-01 RX ORDER — BUDESONIDE 0.5 MG/2ML
0.5 INHALANT ORAL 2 TIMES DAILY
Status: DISCONTINUED | OUTPATIENT
Start: 2019-01-01 | End: 2019-01-01 | Stop reason: HOSPADM

## 2019-01-01 RX ORDER — MORPHINE SULFATE 2 MG/ML
4 INJECTION, SOLUTION INTRAMUSCULAR; INTRAVENOUS
Status: DISCONTINUED | OUTPATIENT
Start: 2019-01-01 | End: 2019-01-01 | Stop reason: HOSPADM

## 2019-01-01 RX ORDER — METOPROLOL SUCCINATE 100 MG/1
100 TABLET, EXTENDED RELEASE ORAL 2 TIMES DAILY
Status: DISCONTINUED | OUTPATIENT
Start: 2019-01-01 | End: 2019-01-01 | Stop reason: HOSPADM

## 2019-01-01 RX ORDER — POLYETHYLENE GLYCOL 3350 17 G/17G
17 POWDER, FOR SOLUTION ORAL DAILY
Status: DISCONTINUED | OUTPATIENT
Start: 2019-01-01 | End: 2019-01-01 | Stop reason: HOSPADM

## 2019-01-01 RX ORDER — ATORVASTATIN CALCIUM 40 MG/1
80 TABLET, FILM COATED ORAL DAILY
Status: DISCONTINUED | OUTPATIENT
Start: 2019-01-01 | End: 2019-01-01 | Stop reason: HOSPADM

## 2019-01-01 RX ORDER — SODIUM CHLORIDE 0.9 % (FLUSH) 0.9 %
10 SYRINGE (ML) INJECTION EVERY 12 HOURS SCHEDULED
Status: DISCONTINUED | OUTPATIENT
Start: 2019-01-01 | End: 2019-01-01 | Stop reason: HOSPADM

## 2019-01-01 RX ORDER — ALBUTEROL SULFATE 90 UG/1
2 AEROSOL, METERED RESPIRATORY (INHALATION) EVERY 6 HOURS PRN
Status: DISCONTINUED | OUTPATIENT
Start: 2019-01-01 | End: 2019-01-01 | Stop reason: HOSPADM

## 2019-01-01 RX ORDER — DIGOXIN 125 MCG
125 TABLET ORAL DAILY
Status: DISCONTINUED | OUTPATIENT
Start: 2019-01-01 | End: 2019-01-01

## 2019-01-01 RX ORDER — MORPHINE SULFATE 2 MG/ML
2 INJECTION, SOLUTION INTRAMUSCULAR; INTRAVENOUS
Status: DISCONTINUED | OUTPATIENT
Start: 2019-01-01 | End: 2019-01-01 | Stop reason: HOSPADM

## 2019-01-01 RX ORDER — METOPROLOL SUCCINATE 100 MG/1
100 TABLET, EXTENDED RELEASE ORAL 2 TIMES DAILY
Qty: 180 TABLET | Refills: 1 | Status: ON HOLD
Start: 2019-01-01 | End: 2019-01-01 | Stop reason: HOSPADM

## 2019-01-01 RX ORDER — NITROGLYCERIN 0.4 MG/1
0.4 TABLET SUBLINGUAL EVERY 5 MIN PRN
Status: DISCONTINUED | OUTPATIENT
Start: 2019-01-01 | End: 2019-01-01 | Stop reason: HOSPADM

## 2019-01-01 RX ORDER — PREDNISONE 20 MG/1
60 TABLET ORAL DAILY
Status: DISCONTINUED | OUTPATIENT
Start: 2019-01-01 | End: 2019-01-01 | Stop reason: HOSPADM

## 2019-01-01 RX ORDER — METHYLPREDNISOLONE SODIUM SUCCINATE 125 MG/2ML
40 INJECTION, POWDER, LYOPHILIZED, FOR SOLUTION INTRAMUSCULAR; INTRAVENOUS DAILY
Status: DISCONTINUED | OUTPATIENT
Start: 2019-01-01 | End: 2019-01-01 | Stop reason: SDUPTHER

## 2019-01-01 RX ORDER — 0.9 % SODIUM CHLORIDE 0.9 %
500 INTRAVENOUS SOLUTION INTRAVENOUS ONCE
Status: COMPLETED | OUTPATIENT
Start: 2019-01-01 | End: 2019-01-01

## 2019-01-01 RX ORDER — NICOTINE 21 MG/24HR
1 PATCH, TRANSDERMAL 24 HOURS TRANSDERMAL DAILY
Status: DISCONTINUED | OUTPATIENT
Start: 2019-01-01 | End: 2019-01-01 | Stop reason: HOSPADM

## 2019-01-01 RX ORDER — MORPHINE SULFATE 2 MG/ML
4 INJECTION, SOLUTION INTRAMUSCULAR; INTRAVENOUS
Status: DISCONTINUED | OUTPATIENT
Start: 2019-01-01 | End: 2019-01-01 | Stop reason: SDUPTHER

## 2019-01-01 RX ORDER — METHYLPREDNISOLONE SODIUM SUCCINATE 40 MG/ML
40 INJECTION, POWDER, LYOPHILIZED, FOR SOLUTION INTRAMUSCULAR; INTRAVENOUS DAILY
Status: DISCONTINUED | OUTPATIENT
Start: 2019-01-01 | End: 2019-01-01 | Stop reason: SDUPTHER

## 2019-01-01 RX ORDER — TAMSULOSIN HYDROCHLORIDE 0.4 MG/1
0.4 CAPSULE ORAL EVERY MORNING
Status: DISCONTINUED | OUTPATIENT
Start: 2019-01-01 | End: 2019-01-01 | Stop reason: HOSPADM

## 2019-01-01 RX ORDER — TOPIRAMATE 25 MG/1
50 TABLET ORAL 2 TIMES DAILY
Status: DISCONTINUED | OUTPATIENT
Start: 2019-01-01 | End: 2019-01-01 | Stop reason: HOSPADM

## 2019-01-01 RX ORDER — ONDANSETRON 2 MG/ML
4 INJECTION INTRAMUSCULAR; INTRAVENOUS EVERY 6 HOURS PRN
Status: DISCONTINUED | OUTPATIENT
Start: 2019-01-01 | End: 2019-01-01 | Stop reason: HOSPADM

## 2019-01-01 RX ORDER — LORAZEPAM 2 MG/ML
0.5 INJECTION INTRAMUSCULAR EVERY 6 HOURS PRN
Status: DISCONTINUED | OUTPATIENT
Start: 2019-01-01 | End: 2019-01-01 | Stop reason: HOSPADM

## 2019-01-01 RX ORDER — DOXYCYCLINE HYCLATE 100 MG/1
100 CAPSULE ORAL 2 TIMES DAILY
Status: DISCONTINUED | OUTPATIENT
Start: 2019-01-01 | End: 2019-01-01

## 2019-01-01 RX ORDER — METHYLPREDNISOLONE SODIUM SUCCINATE 125 MG/2ML
125 INJECTION, POWDER, LYOPHILIZED, FOR SOLUTION INTRAMUSCULAR; INTRAVENOUS ONCE
Status: COMPLETED | OUTPATIENT
Start: 2019-01-01 | End: 2019-01-01

## 2019-01-01 RX ORDER — PREDNISONE 20 MG/1
TABLET ORAL
Qty: 18 TABLET | Refills: 0 | Status: ON HOLD | OUTPATIENT
Start: 2019-01-01 | End: 2019-01-01 | Stop reason: HOSPADM

## 2019-01-01 RX ORDER — WARFARIN SODIUM 2 MG/1
2 TABLET ORAL
Status: COMPLETED | OUTPATIENT
Start: 2019-01-01 | End: 2019-01-01

## 2019-01-01 RX ORDER — FUROSEMIDE 10 MG/ML
20 INJECTION INTRAMUSCULAR; INTRAVENOUS ONCE
Status: DISCONTINUED | OUTPATIENT
Start: 2019-01-01 | End: 2019-01-01

## 2019-01-01 RX ORDER — METOPROLOL SUCCINATE 100 MG/1
100 TABLET, EXTENDED RELEASE ORAL DAILY
Qty: 90 TABLET | Refills: 1 | Status: SHIPPED | OUTPATIENT
Start: 2019-01-01 | End: 2019-01-01 | Stop reason: SDUPTHER

## 2019-01-01 RX ORDER — FUROSEMIDE 10 MG/ML
40 INJECTION INTRAMUSCULAR; INTRAVENOUS DAILY
Status: COMPLETED | OUTPATIENT
Start: 2019-01-01 | End: 2019-01-01

## 2019-01-01 RX ORDER — IPRATROPIUM BROMIDE AND ALBUTEROL SULFATE 2.5; .5 MG/3ML; MG/3ML
1 SOLUTION RESPIRATORY (INHALATION) EVERY 4 HOURS
Status: DISCONTINUED | OUTPATIENT
Start: 2019-01-01 | End: 2019-01-01 | Stop reason: HOSPADM

## 2019-01-01 RX ADMIN — WARFARIN SODIUM 8 MG: 4 TABLET ORAL at 17:40

## 2019-01-01 RX ADMIN — BUDESONIDE 500 MCG: 0.5 SUSPENSION RESPIRATORY (INHALATION) at 21:08

## 2019-01-01 RX ADMIN — METOPROLOL SUCCINATE 100 MG: 100 TABLET, EXTENDED RELEASE ORAL at 22:11

## 2019-01-01 RX ADMIN — PRAMIPEXOLE DIHYDROCHLORIDE 1.5 MG: 1 TABLET ORAL at 20:39

## 2019-01-01 RX ADMIN — ATORVASTATIN CALCIUM 80 MG: 40 TABLET, FILM COATED ORAL at 09:53

## 2019-01-01 RX ADMIN — FORMOTEROL FUMARATE DIHYDRATE 20 MCG: 20 SOLUTION RESPIRATORY (INHALATION) at 09:04

## 2019-01-01 RX ADMIN — ONDANSETRON 4 MG: 2 INJECTION INTRAMUSCULAR; INTRAVENOUS at 20:41

## 2019-01-01 RX ADMIN — METHYLPREDNISOLONE SODIUM SUCCINATE 40 MG: 40 INJECTION, POWDER, LYOPHILIZED, FOR SOLUTION INTRAMUSCULAR; INTRAVENOUS at 04:16

## 2019-01-01 RX ADMIN — OSELTAMIVIR PHOSPHATE 75 MG: 75 CAPSULE ORAL at 21:00

## 2019-01-01 RX ADMIN — METOPROLOL TARTRATE 5 MG: 1 INJECTION, SOLUTION INTRAVENOUS at 21:28

## 2019-01-01 RX ADMIN — Medication 10 ML: at 22:11

## 2019-01-01 RX ADMIN — Medication 10 ML: at 08:53

## 2019-01-01 RX ADMIN — MEMANTINE HYDROCHLORIDE 10 MG: 10 TABLET, FILM COATED ORAL at 10:58

## 2019-01-01 RX ADMIN — ASPIRIN 81 MG 81 MG: 81 TABLET ORAL at 09:09

## 2019-01-01 RX ADMIN — WARFARIN SODIUM 8 MG: 4 TABLET ORAL at 18:35

## 2019-01-01 RX ADMIN — PRAMIPEXOLE DIHYDROCHLORIDE 1.5 MG: 1 TABLET ORAL at 10:23

## 2019-01-01 RX ADMIN — TOPIRAMATE 50 MG: 25 TABLET, FILM COATED ORAL at 09:08

## 2019-01-01 RX ADMIN — TOPIRAMATE 50 MG: 25 TABLET, FILM COATED ORAL at 20:40

## 2019-01-01 RX ADMIN — GUAIFENESIN 400 MG: 400 TABLET ORAL at 09:07

## 2019-01-01 RX ADMIN — SODIUM CHLORIDE: 9 INJECTION, SOLUTION INTRAVENOUS at 06:08

## 2019-01-01 RX ADMIN — BUDESONIDE 500 MCG: 0.5 SUSPENSION RESPIRATORY (INHALATION) at 20:56

## 2019-01-01 RX ADMIN — DIGOXIN 250 MCG: 0.25 INJECTION INTRAMUSCULAR; INTRAVENOUS at 12:40

## 2019-01-01 RX ADMIN — PRAMIPEXOLE DIHYDROCHLORIDE 1.5 MG: 1 TABLET ORAL at 09:30

## 2019-01-01 RX ADMIN — METOPROLOL SUCCINATE 100 MG: 100 TABLET, EXTENDED RELEASE ORAL at 10:26

## 2019-01-01 RX ADMIN — POLYETHYLENE GLYCOL 3350 17 G: 17 POWDER, FOR SOLUTION ORAL at 10:39

## 2019-01-01 RX ADMIN — Medication 10 ML: at 20:40

## 2019-01-01 RX ADMIN — TOPIRAMATE 50 MG: 25 TABLET, FILM COATED ORAL at 20:05

## 2019-01-01 RX ADMIN — Medication 10 ML: at 11:21

## 2019-01-01 RX ADMIN — Medication 10 ML: at 18:52

## 2019-01-01 RX ADMIN — Medication 10 ML: at 05:06

## 2019-01-01 RX ADMIN — METOPROLOL SUCCINATE 100 MG: 100 TABLET, EXTENDED RELEASE ORAL at 09:12

## 2019-01-01 RX ADMIN — IPRATROPIUM BROMIDE AND ALBUTEROL SULFATE 3 AMPULE: .5; 3 SOLUTION RESPIRATORY (INHALATION) at 22:58

## 2019-01-01 RX ADMIN — Medication 10 ML: at 17:40

## 2019-01-01 RX ADMIN — IPRATROPIUM BROMIDE AND ALBUTEROL SULFATE 1 AMPULE: .5; 3 SOLUTION RESPIRATORY (INHALATION) at 08:18

## 2019-01-01 RX ADMIN — METHYLPREDNISOLONE SODIUM SUCCINATE 40 MG: 40 INJECTION, POWDER, FOR SOLUTION INTRAMUSCULAR; INTRAVENOUS at 10:39

## 2019-01-01 RX ADMIN — PRAMIPEXOLE DIHYDROCHLORIDE 1.5 MG: 1 TABLET ORAL at 14:42

## 2019-01-01 RX ADMIN — DOCUSATE SODIUM 100 MG: 100 CAPSULE, LIQUID FILLED ORAL at 20:39

## 2019-01-01 RX ADMIN — METOPROLOL TARTRATE 5 MG: 1 INJECTION, SOLUTION INTRAVENOUS at 16:27

## 2019-01-01 RX ADMIN — PRAMIPEXOLE DIHYDROCHLORIDE 1.5 MG: 1 TABLET ORAL at 20:05

## 2019-01-01 RX ADMIN — FUROSEMIDE 40 MG: 10 INJECTION, SOLUTION INTRAMUSCULAR; INTRAVENOUS at 09:04

## 2019-01-01 RX ADMIN — Medication 10 ML: at 02:57

## 2019-01-01 RX ADMIN — OSELTAMIVIR PHOSPHATE 75 MG: 75 CAPSULE ORAL at 09:07

## 2019-01-01 RX ADMIN — METHYLPREDNISOLONE SODIUM SUCCINATE 40 MG: 40 INJECTION, POWDER, LYOPHILIZED, FOR SOLUTION INTRAMUSCULAR; INTRAVENOUS at 22:24

## 2019-01-01 RX ADMIN — FORMOTEROL FUMARATE DIHYDRATE 20 MCG: 20 SOLUTION RESPIRATORY (INHALATION) at 08:22

## 2019-01-01 RX ADMIN — DOXYCYCLINE HYCLATE 100 MG: 100 CAPSULE ORAL at 20:01

## 2019-01-01 RX ADMIN — PRAMIPEXOLE DIHYDROCHLORIDE 1.5 MG: 1 TABLET ORAL at 13:21

## 2019-01-01 RX ADMIN — IPRATROPIUM BROMIDE AND ALBUTEROL SULFATE 1 AMPULE: .5; 3 SOLUTION RESPIRATORY (INHALATION) at 17:56

## 2019-01-01 RX ADMIN — Medication 10 ML: at 09:26

## 2019-01-01 RX ADMIN — OSELTAMIVIR PHOSPHATE 75 MG: 75 CAPSULE ORAL at 10:26

## 2019-01-01 RX ADMIN — GUAIFENESIN 400 MG: 400 TABLET ORAL at 20:01

## 2019-01-01 RX ADMIN — TOPIRAMATE 50 MG: 25 TABLET, FILM COATED ORAL at 21:00

## 2019-01-01 RX ADMIN — Medication 10 ML: at 22:12

## 2019-01-01 RX ADMIN — PANTOPRAZOLE SODIUM 40 MG: 40 TABLET, DELAYED RELEASE ORAL at 06:10

## 2019-01-01 RX ADMIN — PRAMIPEXOLE DIHYDROCHLORIDE 1.5 MG: 1 TABLET ORAL at 14:06

## 2019-01-01 RX ADMIN — MORPHINE SULFATE 2 MG: 2 INJECTION, SOLUTION INTRAMUSCULAR; INTRAVENOUS at 22:10

## 2019-01-01 RX ADMIN — PRAMIPEXOLE DIHYDROCHLORIDE 1.5 MG: 1 TABLET ORAL at 20:26

## 2019-01-01 RX ADMIN — METHYLPREDNISOLONE SODIUM SUCCINATE 40 MG: 40 INJECTION, POWDER, LYOPHILIZED, FOR SOLUTION INTRAMUSCULAR; INTRAVENOUS at 05:06

## 2019-01-01 RX ADMIN — MEMANTINE HYDROCHLORIDE 10 MG: 10 TABLET, FILM COATED ORAL at 10:38

## 2019-01-01 RX ADMIN — Medication 10 ML: at 12:40

## 2019-01-01 RX ADMIN — METOPROLOL SUCCINATE 100 MG: 100 TABLET, EXTENDED RELEASE ORAL at 21:48

## 2019-01-01 RX ADMIN — METHYLPREDNISOLONE SODIUM SUCCINATE 40 MG: 40 INJECTION, POWDER, LYOPHILIZED, FOR SOLUTION INTRAMUSCULAR; INTRAVENOUS at 09:53

## 2019-01-01 RX ADMIN — Medication 10 ML: at 01:27

## 2019-01-01 RX ADMIN — TIOTROPIUM BROMIDE 18 MCG: 18 CAPSULE ORAL; RESPIRATORY (INHALATION) at 09:04

## 2019-01-01 RX ADMIN — Medication 10 ML: at 10:24

## 2019-01-01 RX ADMIN — MEMANTINE HYDROCHLORIDE 10 MG: 10 TABLET, FILM COATED ORAL at 09:13

## 2019-01-01 RX ADMIN — SODIUM CHLORIDE: 9 INJECTION, SOLUTION INTRAVENOUS at 09:07

## 2019-01-01 RX ADMIN — BUDESONIDE 500 MCG: 0.5 SUSPENSION RESPIRATORY (INHALATION) at 19:24

## 2019-01-01 RX ADMIN — LORAZEPAM 0.5 MG: 2 INJECTION INTRAMUSCULAR; INTRAVENOUS at 23:10

## 2019-01-01 RX ADMIN — DILTIAZEM HYDROCHLORIDE 30 MG: 30 TABLET, FILM COATED ORAL at 23:45

## 2019-01-01 RX ADMIN — GUAIFENESIN 400 MG: 400 TABLET ORAL at 10:25

## 2019-01-01 RX ADMIN — FUROSEMIDE 40 MG: 40 TABLET ORAL at 09:12

## 2019-01-01 RX ADMIN — GUAIFENESIN 400 MG: 400 TABLET ORAL at 21:48

## 2019-01-01 RX ADMIN — Medication 10 ML: at 20:25

## 2019-01-01 RX ADMIN — FORMOTEROL FUMARATE DIHYDRATE 20 MCG: 20 SOLUTION RESPIRATORY (INHALATION) at 08:18

## 2019-01-01 RX ADMIN — ACETAMINOPHEN 1000 MG: 500 TABLET ORAL at 02:55

## 2019-01-01 RX ADMIN — TOPIRAMATE 50 MG: 25 TABLET, FILM COATED ORAL at 21:47

## 2019-01-01 RX ADMIN — Medication 10 ML: at 05:59

## 2019-01-01 RX ADMIN — METOPROLOL TARTRATE 5 MG: 1 INJECTION, SOLUTION INTRAVENOUS at 16:12

## 2019-01-01 RX ADMIN — DILTIAZEM HYDROCHLORIDE 120 MG: 120 CAPSULE, COATED, EXTENDED RELEASE ORAL at 08:22

## 2019-01-01 RX ADMIN — PRAMIPEXOLE DIHYDROCHLORIDE 1.5 MG: 1 TABLET ORAL at 22:12

## 2019-01-01 RX ADMIN — DILTIAZEM HYDROCHLORIDE 10 MG: 5 INJECTION INTRAVENOUS at 22:49

## 2019-01-01 RX ADMIN — PRAMIPEXOLE DIHYDROCHLORIDE 1.5 MG: 1 TABLET ORAL at 09:08

## 2019-01-01 RX ADMIN — METHYLPREDNISOLONE SODIUM SUCCINATE 40 MG: 40 INJECTION, POWDER, FOR SOLUTION INTRAMUSCULAR; INTRAVENOUS at 15:02

## 2019-01-01 RX ADMIN — BUDESONIDE 500 MCG: 0.5 SUSPENSION RESPIRATORY (INHALATION) at 20:03

## 2019-01-01 RX ADMIN — MEMANTINE HYDROCHLORIDE 10 MG: 10 TABLET, FILM COATED ORAL at 09:56

## 2019-01-01 RX ADMIN — BUDESONIDE 500 MCG: 0.5 SUSPENSION RESPIRATORY (INHALATION) at 08:22

## 2019-01-01 RX ADMIN — FORMOTEROL FUMARATE DIHYDRATE 20 MCG: 20 SOLUTION RESPIRATORY (INHALATION) at 07:42

## 2019-01-01 RX ADMIN — OSELTAMIVIR PHOSPHATE 75 MG: 75 CAPSULE ORAL at 21:29

## 2019-01-01 RX ADMIN — FORMOTEROL FUMARATE DIHYDRATE 20 MCG: 20 SOLUTION RESPIRATORY (INHALATION) at 20:16

## 2019-01-01 RX ADMIN — METHYLPREDNISOLONE SODIUM SUCCINATE 60 MG: 125 INJECTION, POWDER, LYOPHILIZED, FOR SOLUTION INTRAMUSCULAR; INTRAVENOUS at 09:13

## 2019-01-01 RX ADMIN — ASPIRIN 81 MG 81 MG: 81 TABLET ORAL at 10:23

## 2019-01-01 RX ADMIN — TOPIRAMATE 50 MG: 25 TABLET, FILM COATED ORAL at 09:11

## 2019-01-01 RX ADMIN — BUDESONIDE 500 MCG: 0.5 SUSPENSION RESPIRATORY (INHALATION) at 08:18

## 2019-01-01 RX ADMIN — FAMOTIDINE 20 MG: 10 INJECTION, SOLUTION INTRAVENOUS at 09:04

## 2019-01-01 RX ADMIN — LEVALBUTEROL 0.63 MG: 0.63 SOLUTION RESPIRATORY (INHALATION) at 02:11

## 2019-01-01 RX ADMIN — MORPHINE SULFATE 2 MG: 2 INJECTION, SOLUTION INTRAMUSCULAR; INTRAVENOUS at 05:58

## 2019-01-01 RX ADMIN — BUDESONIDE 500 MCG: 0.5 SUSPENSION RESPIRATORY (INHALATION) at 08:09

## 2019-01-01 RX ADMIN — WARFARIN SODIUM 8 MG: 4 TABLET ORAL at 17:31

## 2019-01-01 RX ADMIN — Medication 10 ML: at 04:16

## 2019-01-01 RX ADMIN — DOXYCYCLINE HYCLATE 100 MG: 100 CAPSULE ORAL at 12:06

## 2019-01-01 RX ADMIN — ASPIRIN 81 MG CHEWABLE TABLET 81 MG: 81 TABLET CHEWABLE at 10:38

## 2019-01-01 RX ADMIN — LORAZEPAM 0.5 MG: 2 INJECTION INTRAMUSCULAR; INTRAVENOUS at 22:10

## 2019-01-01 RX ADMIN — FORMOTEROL FUMARATE DIHYDRATE 20 MCG: 20 SOLUTION RESPIRATORY (INHALATION) at 21:50

## 2019-01-01 RX ADMIN — BUDESONIDE 500 MCG: 0.5 SUSPENSION RESPIRATORY (INHALATION) at 06:53

## 2019-01-01 RX ADMIN — METHYLPREDNISOLONE SODIUM SUCCINATE 40 MG: 40 INJECTION, POWDER, FOR SOLUTION INTRAMUSCULAR; INTRAVENOUS at 08:06

## 2019-01-01 RX ADMIN — ATORVASTATIN CALCIUM 80 MG: 40 TABLET, FILM COATED ORAL at 09:12

## 2019-01-01 RX ADMIN — PRAMIPEXOLE DIHYDROCHLORIDE 1.5 MG: 1 TABLET ORAL at 13:07

## 2019-01-01 RX ADMIN — IPRATROPIUM BROMIDE AND ALBUTEROL SULFATE 1 AMPULE: .5; 3 SOLUTION RESPIRATORY (INHALATION) at 16:02

## 2019-01-01 RX ADMIN — PANTOPRAZOLE SODIUM 40 MG: 40 TABLET, DELAYED RELEASE ORAL at 05:55

## 2019-01-01 RX ADMIN — DIGOXIN 500 MCG: 0.25 INJECTION INTRAMUSCULAR; INTRAVENOUS at 06:38

## 2019-01-01 RX ADMIN — BUDESONIDE 500 MCG: 0.5 SUSPENSION RESPIRATORY (INHALATION) at 21:50

## 2019-01-01 RX ADMIN — METOPROLOL SUCCINATE 100 MG: 100 TABLET, EXTENDED RELEASE ORAL at 03:37

## 2019-01-01 RX ADMIN — GUAIFENESIN 400 MG: 400 TABLET ORAL at 13:07

## 2019-01-01 RX ADMIN — METOPROLOL SUCCINATE 100 MG: 100 TABLET, EXTENDED RELEASE ORAL at 20:24

## 2019-01-01 RX ADMIN — IPRATROPIUM BROMIDE AND ALBUTEROL SULFATE 1 AMPULE: .5; 3 SOLUTION RESPIRATORY (INHALATION) at 12:21

## 2019-01-01 RX ADMIN — TOPIRAMATE 50 MG: 25 TABLET, FILM COATED ORAL at 10:22

## 2019-01-01 RX ADMIN — GUAIFENESIN 400 MG: 400 TABLET ORAL at 20:39

## 2019-01-01 RX ADMIN — Medication 10 ML: at 22:24

## 2019-01-01 RX ADMIN — Medication 10 ML: at 10:48

## 2019-01-01 RX ADMIN — MEMANTINE HYDROCHLORIDE 10 MG: 10 TABLET, FILM COATED ORAL at 10:22

## 2019-01-01 RX ADMIN — WATER 2 ML: 1 INJECTION INTRAMUSCULAR; INTRAVENOUS; SUBCUTANEOUS at 09:08

## 2019-01-01 RX ADMIN — METHYLPREDNISOLONE SODIUM SUCCINATE 40 MG: 40 INJECTION, POWDER, FOR SOLUTION INTRAMUSCULAR; INTRAVENOUS at 08:52

## 2019-01-01 RX ADMIN — FUROSEMIDE 40 MG: 40 TABLET ORAL at 10:25

## 2019-01-01 RX ADMIN — METOPROLOL SUCCINATE 100 MG: 100 TABLET, EXTENDED RELEASE ORAL at 09:07

## 2019-01-01 RX ADMIN — GUAIFENESIN 400 MG: 400 TABLET ORAL at 13:32

## 2019-01-01 RX ADMIN — METOPROLOL TARTRATE 5 MG: 1 INJECTION, SOLUTION INTRAVENOUS at 10:12

## 2019-01-01 RX ADMIN — ATORVASTATIN CALCIUM 80 MG: 40 TABLET, FILM COATED ORAL at 09:05

## 2019-01-01 RX ADMIN — VANCOMYCIN HYDROCHLORIDE 1 G: 1 INJECTION, POWDER, LYOPHILIZED, FOR SOLUTION INTRAVENOUS at 03:03

## 2019-01-01 RX ADMIN — PRAMIPEXOLE DIHYDROCHLORIDE 1.5 MG: 1 TABLET ORAL at 09:52

## 2019-01-01 RX ADMIN — DIGOXIN 250 MCG: 0.25 INJECTION INTRAMUSCULAR; INTRAVENOUS at 18:51

## 2019-01-01 RX ADMIN — PANTOPRAZOLE SODIUM 40 MG: 40 INJECTION, POWDER, FOR SOLUTION INTRAVENOUS at 10:20

## 2019-01-01 RX ADMIN — DIGOXIN 250 MCG: 0.25 INJECTION INTRAMUSCULAR; INTRAVENOUS at 12:36

## 2019-01-01 RX ADMIN — METHYLPREDNISOLONE SODIUM SUCCINATE 40 MG: 125 INJECTION, POWDER, LYOPHILIZED, FOR SOLUTION INTRAMUSCULAR; INTRAVENOUS at 10:20

## 2019-01-01 RX ADMIN — FORMOTEROL FUMARATE DIHYDRATE 20 MCG: 20 SOLUTION RESPIRATORY (INHALATION) at 10:13

## 2019-01-01 RX ADMIN — FAMOTIDINE 20 MG: 10 INJECTION, SOLUTION INTRAVENOUS at 22:12

## 2019-01-01 RX ADMIN — Medication 10 ML: at 09:05

## 2019-01-01 RX ADMIN — TAMSULOSIN HYDROCHLORIDE 0.4 MG: 0.4 CAPSULE ORAL at 09:11

## 2019-01-01 RX ADMIN — BUDESONIDE 500 MCG: 0.5 SUSPENSION RESPIRATORY (INHALATION) at 07:42

## 2019-01-01 RX ADMIN — MORPHINE SULFATE 2 MG: 2 INJECTION, SOLUTION INTRAMUSCULAR; INTRAVENOUS at 01:27

## 2019-01-01 RX ADMIN — OSELTAMIVIR PHOSPHATE 75 MG: 75 CAPSULE ORAL at 10:16

## 2019-01-01 RX ADMIN — METHYLPREDNISOLONE SODIUM SUCCINATE 40 MG: 40 INJECTION, POWDER, FOR SOLUTION INTRAMUSCULAR; INTRAVENOUS at 21:01

## 2019-01-01 RX ADMIN — Medication 10 ML: at 17:34

## 2019-01-01 RX ADMIN — ASPIRIN 81 MG CHEWABLE TABLET 81 MG: 81 TABLET CHEWABLE at 10:21

## 2019-01-01 RX ADMIN — METHYLPREDNISOLONE SODIUM SUCCINATE 60 MG: 125 INJECTION, POWDER, LYOPHILIZED, FOR SOLUTION INTRAMUSCULAR; INTRAVENOUS at 09:07

## 2019-01-01 RX ADMIN — GUAIFENESIN 400 MG: 400 TABLET ORAL at 21:28

## 2019-01-01 RX ADMIN — PRAMIPEXOLE DIHYDROCHLORIDE 1.5 MG: 1 TABLET ORAL at 13:30

## 2019-01-01 RX ADMIN — SODIUM CHLORIDE 500 ML: 9 INJECTION, SOLUTION INTRAVENOUS at 22:32

## 2019-01-01 RX ADMIN — FAMOTIDINE 20 MG: 10 INJECTION, SOLUTION INTRAVENOUS at 02:56

## 2019-01-01 RX ADMIN — METOPROLOL TARTRATE 5 MG: 1 INJECTION, SOLUTION INTRAVENOUS at 21:15

## 2019-01-01 RX ADMIN — FORMOTEROL FUMARATE DIHYDRATE 20 MCG: 20 SOLUTION RESPIRATORY (INHALATION) at 18:56

## 2019-01-01 RX ADMIN — MEMANTINE HYDROCHLORIDE 10 MG: 10 TABLET, FILM COATED ORAL at 09:10

## 2019-01-01 RX ADMIN — TIOTROPIUM BROMIDE 18 MCG: 18 CAPSULE ORAL; RESPIRATORY (INHALATION) at 08:22

## 2019-01-01 RX ADMIN — BUDESONIDE 500 MCG: 0.5 SUSPENSION RESPIRATORY (INHALATION) at 18:56

## 2019-01-01 RX ADMIN — METOPROLOL SUCCINATE 100 MG: 100 TABLET, EXTENDED RELEASE ORAL at 09:53

## 2019-01-01 RX ADMIN — TOPIRAMATE 50 MG: 25 TABLET, FILM COATED ORAL at 20:24

## 2019-01-01 RX ADMIN — IPRATROPIUM BROMIDE AND ALBUTEROL SULFATE 1 AMPULE: .5; 3 SOLUTION RESPIRATORY (INHALATION) at 13:08

## 2019-01-01 RX ADMIN — PRAMIPEXOLE DIHYDROCHLORIDE 1.5 MG: 1 TABLET ORAL at 21:29

## 2019-01-01 RX ADMIN — Medication 10 ML: at 20:02

## 2019-01-01 RX ADMIN — CEFEPIME 2 G: 2 INJECTION, POWDER, FOR SOLUTION INTRAVENOUS at 04:28

## 2019-01-01 RX ADMIN — DILTIAZEM HYDROCHLORIDE 30 MG: 30 TABLET, FILM COATED ORAL at 06:10

## 2019-01-01 RX ADMIN — GUAIFENESIN 400 MG: 400 TABLET ORAL at 13:30

## 2019-01-01 RX ADMIN — DOCUSATE SODIUM 100 MG: 100 CAPSULE, LIQUID FILLED ORAL at 21:48

## 2019-01-01 RX ADMIN — WARFARIN SODIUM 8 MG: 4 TABLET ORAL at 17:26

## 2019-01-01 RX ADMIN — PRAMIPEXOLE DIHYDROCHLORIDE 1.5 MG: 1 TABLET ORAL at 14:20

## 2019-01-01 RX ADMIN — BUDESONIDE 500 MCG: 0.5 SUSPENSION RESPIRATORY (INHALATION) at 10:13

## 2019-01-01 RX ADMIN — PRAMIPEXOLE DIHYDROCHLORIDE 1.5 MG: 1 TABLET ORAL at 09:04

## 2019-01-01 RX ADMIN — ATORVASTATIN CALCIUM 80 MG: 40 TABLET, FILM COATED ORAL at 10:23

## 2019-01-01 RX ADMIN — ONDANSETRON 4 MG: 2 INJECTION INTRAMUSCULAR; INTRAVENOUS at 21:39

## 2019-01-01 RX ADMIN — Medication 10 ML: at 21:28

## 2019-01-01 RX ADMIN — GUAIFENESIN 400 MG: 400 TABLET ORAL at 10:37

## 2019-01-01 RX ADMIN — FUROSEMIDE 40 MG: 40 TABLET ORAL at 09:09

## 2019-01-01 RX ADMIN — GUAIFENESIN 400 MG: 400 TABLET ORAL at 10:22

## 2019-01-01 RX ADMIN — IPRATROPIUM BROMIDE AND ALBUTEROL SULFATE 1 AMPULE: .5; 3 SOLUTION RESPIRATORY (INHALATION) at 03:11

## 2019-01-01 RX ADMIN — DOCUSATE SODIUM 100 MG: 100 CAPSULE, LIQUID FILLED ORAL at 10:21

## 2019-01-01 RX ADMIN — GUAIFENESIN 400 MG: 400 TABLET ORAL at 14:42

## 2019-01-01 RX ADMIN — METOPROLOL SUCCINATE 100 MG: 100 TABLET, EXTENDED RELEASE ORAL at 20:09

## 2019-01-01 RX ADMIN — PRAMIPEXOLE DIHYDROCHLORIDE 1.5 MG: 1 TABLET ORAL at 13:32

## 2019-01-01 RX ADMIN — Medication 10 ML: at 08:06

## 2019-01-01 RX ADMIN — FORMOTEROL FUMARATE DIHYDRATE 20 MCG: 20 SOLUTION RESPIRATORY (INHALATION) at 08:09

## 2019-01-01 RX ADMIN — MEMANTINE HYDROCHLORIDE 10 MG: 10 TABLET, FILM COATED ORAL at 21:29

## 2019-01-01 RX ADMIN — Medication 10 ML: at 21:00

## 2019-01-01 RX ADMIN — METOPROLOL SUCCINATE 100 MG: 100 TABLET, EXTENDED RELEASE ORAL at 10:38

## 2019-01-01 RX ADMIN — MEMANTINE HYDROCHLORIDE 10 MG: 10 TABLET, FILM COATED ORAL at 20:02

## 2019-01-01 RX ADMIN — METOPROLOL SUCCINATE 100 MG: 100 TABLET, EXTENDED RELEASE ORAL at 09:05

## 2019-01-01 RX ADMIN — TAMSULOSIN HYDROCHLORIDE 0.4 MG: 0.4 CAPSULE ORAL at 09:07

## 2019-01-01 RX ADMIN — GUAIFENESIN 400 MG: 400 TABLET ORAL at 14:06

## 2019-01-01 RX ADMIN — IPRATROPIUM BROMIDE AND ALBUTEROL SULFATE 1 AMPULE: .5; 3 SOLUTION RESPIRATORY (INHALATION) at 15:49

## 2019-01-01 RX ADMIN — TOPIRAMATE 50 MG: 25 TABLET, FILM COATED ORAL at 10:39

## 2019-01-01 RX ADMIN — FORMOTEROL FUMARATE DIHYDRATE 20 MCG: 20 SOLUTION RESPIRATORY (INHALATION) at 20:56

## 2019-01-01 RX ADMIN — DIGOXIN 125 MCG: 0.25 INJECTION INTRAMUSCULAR; INTRAVENOUS at 18:20

## 2019-01-01 RX ADMIN — TOPIRAMATE 50 MG: 25 TABLET, FILM COATED ORAL at 09:53

## 2019-01-01 RX ADMIN — GUAIFENESIN 400 MG: 400 TABLET ORAL at 13:21

## 2019-01-01 RX ADMIN — FAMOTIDINE 20 MG: 10 INJECTION, SOLUTION INTRAVENOUS at 09:53

## 2019-01-01 RX ADMIN — ASPIRIN 81 MG CHEWABLE TABLET 81 MG: 81 TABLET CHEWABLE at 09:05

## 2019-01-01 RX ADMIN — DOXYCYCLINE HYCLATE 100 MG: 100 CAPSULE ORAL at 10:38

## 2019-01-01 RX ADMIN — METOPROLOL TARTRATE 5 MG: 1 INJECTION, SOLUTION INTRAVENOUS at 03:15

## 2019-01-01 RX ADMIN — METHYLPREDNISOLONE SODIUM SUCCINATE 40 MG: 40 INJECTION, POWDER, LYOPHILIZED, FOR SOLUTION INTRAMUSCULAR; INTRAVENOUS at 17:40

## 2019-01-01 RX ADMIN — MORPHINE SULFATE 2 MG: 2 INJECTION, SOLUTION INTRAMUSCULAR; INTRAVENOUS at 10:57

## 2019-01-01 RX ADMIN — Medication 10 ML: at 09:53

## 2019-01-01 RX ADMIN — DIGOXIN 125 MCG: 0.25 INJECTION INTRAMUSCULAR; INTRAVENOUS at 08:03

## 2019-01-01 RX ADMIN — LORAZEPAM 0.5 MG: 2 INJECTION INTRAMUSCULAR; INTRAVENOUS at 05:20

## 2019-01-01 RX ADMIN — BUDESONIDE 500 MCG: 0.5 SUSPENSION RESPIRATORY (INHALATION) at 20:16

## 2019-01-01 RX ADMIN — Medication 10 ML: at 21:48

## 2019-01-01 RX ADMIN — ONDANSETRON 4 MG: 2 INJECTION INTRAMUSCULAR; INTRAVENOUS at 15:48

## 2019-01-01 RX ADMIN — MEMANTINE HYDROCHLORIDE 10 MG: 10 TABLET, FILM COATED ORAL at 03:38

## 2019-01-01 RX ADMIN — FORMOTEROL FUMARATE DIHYDRATE 20 MCG: 20 SOLUTION RESPIRATORY (INHALATION) at 08:14

## 2019-01-01 RX ADMIN — FORMOTEROL FUMARATE DIHYDRATE 20 MCG: 20 SOLUTION RESPIRATORY (INHALATION) at 20:03

## 2019-01-01 RX ADMIN — TOPIRAMATE 50 MG: 25 TABLET, FILM COATED ORAL at 09:39

## 2019-01-01 RX ADMIN — GUAIFENESIN 400 MG: 400 TABLET ORAL at 09:05

## 2019-01-01 RX ADMIN — MEMANTINE HYDROCHLORIDE 10 MG: 10 TABLET, FILM COATED ORAL at 21:00

## 2019-01-01 RX ADMIN — ASPIRIN 81 MG CHEWABLE TABLET 81 MG: 81 TABLET CHEWABLE at 09:53

## 2019-01-01 RX ADMIN — FUROSEMIDE 40 MG: 40 TABLET ORAL at 09:53

## 2019-01-01 RX ADMIN — GUAIFENESIN 400 MG: 400 TABLET ORAL at 21:29

## 2019-01-01 RX ADMIN — GUAIFENESIN 400 MG: 400 TABLET ORAL at 20:24

## 2019-01-01 RX ADMIN — MEMANTINE HYDROCHLORIDE 10 MG: 10 TABLET, FILM COATED ORAL at 22:11

## 2019-01-01 RX ADMIN — PRAMIPEXOLE DIHYDROCHLORIDE 1.5 MG: 1 TABLET ORAL at 09:11

## 2019-01-01 RX ADMIN — Medication: at 16:07

## 2019-01-01 RX ADMIN — TOPIRAMATE 50 MG: 25 TABLET, FILM COATED ORAL at 22:11

## 2019-01-01 RX ADMIN — PANTOPRAZOLE SODIUM 40 MG: 40 TABLET, DELAYED RELEASE ORAL at 05:01

## 2019-01-01 RX ADMIN — LORAZEPAM 0.5 MG: 2 INJECTION INTRAMUSCULAR; INTRAVENOUS at 05:58

## 2019-01-01 RX ADMIN — IPRATROPIUM BROMIDE AND ALBUTEROL SULFATE 1 AMPULE: .5; 3 SOLUTION RESPIRATORY (INHALATION) at 11:46

## 2019-01-01 RX ADMIN — TAMSULOSIN HYDROCHLORIDE 0.4 MG: 0.4 CAPSULE ORAL at 09:53

## 2019-01-01 RX ADMIN — MEMANTINE HYDROCHLORIDE 10 MG: 10 TABLET, FILM COATED ORAL at 20:39

## 2019-01-01 RX ADMIN — WATER 1 ML: 1 INJECTION INTRAMUSCULAR; INTRAVENOUS; SUBCUTANEOUS at 17:40

## 2019-01-01 RX ADMIN — METHYLPREDNISOLONE SODIUM SUCCINATE 125 MG: 125 INJECTION, POWDER, FOR SOLUTION INTRAMUSCULAR; INTRAVENOUS at 22:33

## 2019-01-01 RX ADMIN — MEMANTINE HYDROCHLORIDE 10 MG: 10 TABLET, FILM COATED ORAL at 20:24

## 2019-01-01 RX ADMIN — FUROSEMIDE 40 MG: 10 INJECTION, SOLUTION INTRAMUSCULAR; INTRAVENOUS at 11:21

## 2019-01-01 RX ADMIN — ATORVASTATIN CALCIUM 80 MG: 40 TABLET, FILM COATED ORAL at 10:22

## 2019-01-01 RX ADMIN — ACETAMINOPHEN 500 MG: 500 TABLET ORAL at 01:59

## 2019-01-01 RX ADMIN — MEMANTINE HYDROCHLORIDE 10 MG: 10 TABLET, FILM COATED ORAL at 21:48

## 2019-01-01 RX ADMIN — FAMOTIDINE 20 MG: 10 INJECTION, SOLUTION INTRAVENOUS at 20:26

## 2019-01-01 RX ADMIN — IPRATROPIUM BROMIDE AND ALBUTEROL SULFATE 1 AMPULE: .5; 3 SOLUTION RESPIRATORY (INHALATION) at 10:13

## 2019-01-01 RX ADMIN — FORMOTEROL FUMARATE DIHYDRATE 20 MCG: 20 SOLUTION RESPIRATORY (INHALATION) at 19:24

## 2019-01-01 RX ADMIN — MORPHINE SULFATE 2 MG: 2 INJECTION, SOLUTION INTRAMUSCULAR; INTRAVENOUS at 08:24

## 2019-01-01 RX ADMIN — PANTOPRAZOLE SODIUM 40 MG: 40 TABLET, DELAYED RELEASE ORAL at 06:08

## 2019-01-01 RX ADMIN — ASPIRIN 81 MG 81 MG: 81 TABLET ORAL at 09:12

## 2019-01-01 RX ADMIN — PRAMIPEXOLE DIHYDROCHLORIDE 1.5 MG: 1 TABLET ORAL at 22:34

## 2019-01-01 RX ADMIN — PREDNISONE 60 MG: 20 TABLET ORAL at 10:27

## 2019-01-01 RX ADMIN — TOPIRAMATE 50 MG: 25 TABLET, FILM COATED ORAL at 09:05

## 2019-01-01 RX ADMIN — TIOTROPIUM BROMIDE 18 MCG: 18 CAPSULE ORAL; RESPIRATORY (INHALATION) at 08:14

## 2019-01-01 RX ADMIN — Medication 10 ML: at 21:01

## 2019-01-01 RX ADMIN — FUROSEMIDE 20 MG: 10 INJECTION, SOLUTION INTRAVENOUS at 00:16

## 2019-01-01 RX ADMIN — POLYETHYLENE GLYCOL 3350 17 G: 17 POWDER, FOR SOLUTION ORAL at 10:21

## 2019-01-01 RX ADMIN — LORAZEPAM 0.5 MG: 2 INJECTION INTRAMUSCULAR; INTRAVENOUS at 12:00

## 2019-01-01 RX ADMIN — TAMSULOSIN HYDROCHLORIDE 0.4 MG: 0.4 CAPSULE ORAL at 10:22

## 2019-01-01 RX ADMIN — BUDESONIDE 500 MCG: 0.5 SUSPENSION RESPIRATORY (INHALATION) at 07:17

## 2019-01-01 RX ADMIN — DIGOXIN 125 MCG: 125 TABLET ORAL at 10:22

## 2019-01-01 RX ADMIN — PRAMIPEXOLE DIHYDROCHLORIDE 1.5 MG: 1 TABLET ORAL at 21:47

## 2019-01-01 RX ADMIN — BUDESONIDE 500 MCG: 0.5 SUSPENSION RESPIRATORY (INHALATION) at 09:03

## 2019-01-01 RX ADMIN — WARFARIN SODIUM 2 MG: 2 TABLET ORAL at 17:31

## 2019-01-01 RX ADMIN — ATORVASTATIN CALCIUM 80 MG: 40 TABLET, FILM COATED ORAL at 10:37

## 2019-01-01 RX ADMIN — DOCUSATE SODIUM 100 MG: 100 CAPSULE, LIQUID FILLED ORAL at 20:01

## 2019-01-01 RX ADMIN — FORMOTEROL FUMARATE DIHYDRATE 20 MCG: 20 SOLUTION RESPIRATORY (INHALATION) at 06:53

## 2019-01-01 RX ADMIN — DIGOXIN 125 MCG: 125 TABLET ORAL at 10:38

## 2019-01-01 RX ADMIN — ATORVASTATIN CALCIUM 80 MG: 40 TABLET, FILM COATED ORAL at 09:07

## 2019-01-01 RX ADMIN — TOPIRAMATE 50 MG: 25 TABLET, FILM COATED ORAL at 10:59

## 2019-01-01 RX ADMIN — TOPIRAMATE 50 MG: 25 TABLET, FILM COATED ORAL at 21:29

## 2019-01-01 RX ADMIN — MEMANTINE HYDROCHLORIDE 10 MG: 10 TABLET, FILM COATED ORAL at 09:09

## 2019-01-01 RX ADMIN — DOCUSATE SODIUM 100 MG: 100 CAPSULE, LIQUID FILLED ORAL at 10:37

## 2019-01-01 RX ADMIN — PRAMIPEXOLE DIHYDROCHLORIDE 1.5 MG: 1 TABLET ORAL at 10:27

## 2019-01-01 RX ADMIN — FORMOTEROL FUMARATE DIHYDRATE 20 MCG: 20 SOLUTION RESPIRATORY (INHALATION) at 21:08

## 2019-01-01 RX ADMIN — TAMSULOSIN HYDROCHLORIDE 0.4 MG: 0.4 CAPSULE ORAL at 10:27

## 2019-01-01 RX ADMIN — TAMSULOSIN HYDROCHLORIDE 0.4 MG: 0.4 CAPSULE ORAL at 10:38

## 2019-01-01 RX ADMIN — BUDESONIDE 500 MCG: 0.5 SUSPENSION RESPIRATORY (INHALATION) at 08:14

## 2019-01-01 RX ADMIN — FORMOTEROL FUMARATE DIHYDRATE 20 MCG: 20 SOLUTION RESPIRATORY (INHALATION) at 07:17

## 2019-01-01 RX ADMIN — DILTIAZEM HYDROCHLORIDE 30 MG: 30 TABLET, FILM COATED ORAL at 17:43

## 2019-01-01 RX ADMIN — TAMSULOSIN HYDROCHLORIDE 0.4 MG: 0.4 CAPSULE ORAL at 09:05

## 2019-01-01 RX ADMIN — GUAIFENESIN 400 MG: 400 TABLET ORAL at 21:00

## 2019-01-01 RX ADMIN — Medication 10 ML: at 09:13

## 2019-01-01 RX ADMIN — CEFEPIME 2 G: 2 INJECTION, POWDER, FOR SOLUTION INTRAVENOUS at 11:21

## 2019-01-01 RX ADMIN — PRAMIPEXOLE DIHYDROCHLORIDE 1.5 MG: 1 TABLET ORAL at 10:39

## 2019-01-01 RX ADMIN — DILTIAZEM HYDROCHLORIDE 120 MG: 120 CAPSULE, COATED, EXTENDED RELEASE ORAL at 13:02

## 2019-01-01 RX ADMIN — DOXYCYCLINE HYCLATE 100 MG: 100 CAPSULE ORAL at 21:48

## 2019-01-01 ASSESSMENT — PAIN SCALES - GENERAL
PAINLEVEL_OUTOF10: 0
PAINLEVEL_OUTOF10: 6
PAINLEVEL_OUTOF10: 0
PAINLEVEL_OUTOF10: 10
PAINLEVEL_OUTOF10: 0
PAINLEVEL_OUTOF10: 10
PAINLEVEL_OUTOF10: 0

## 2019-01-01 ASSESSMENT — ENCOUNTER SYMPTOMS
CHEST TIGHTNESS: 0
RHINORRHEA: 0
NAUSEA: 0
COUGH: 1
ABDOMINAL PAIN: 0
SORE THROAT: 0
CONSTIPATION: 0
BLOOD IN STOOL: 0
WHEEZING: 0
VOMITING: 0
DYSPNEA ASSOCIATED WITH: MINIMAL EXERTION
SHORTNESS OF BREATH: 1
DIARRHEA: 1
BACK PAIN: 0

## 2019-01-01 ASSESSMENT — PAIN - FUNCTIONAL ASSESSMENT: PAIN_FUNCTIONAL_ASSESSMENT: ACTIVITIES ARE NOT PREVENTED

## 2019-01-01 ASSESSMENT — PAIN DESCRIPTION - PAIN TYPE
TYPE: ACUTE PAIN
TYPE: ACUTE PAIN

## 2019-01-01 ASSESSMENT — PAIN DESCRIPTION - LOCATION
LOCATION: HEAD
LOCATION: CHEST

## 2019-01-01 ASSESSMENT — PAIN DESCRIPTION - DESCRIPTORS
DESCRIPTORS: ACHING
DESCRIPTORS: SHARP

## 2019-01-12 PROBLEM — J44.1 COPD WITH ACUTE EXACERBATION (HCC): Status: ACTIVE | Noted: 2019-01-01

## 2019-01-20 LAB
BLOOD CULTURE, ROUTINE: NORMAL
CULTURE, BLOOD 2: NORMAL